# Patient Record
Sex: FEMALE | Race: WHITE | NOT HISPANIC OR LATINO
[De-identification: names, ages, dates, MRNs, and addresses within clinical notes are randomized per-mention and may not be internally consistent; named-entity substitution may affect disease eponyms.]

---

## 2017-04-26 PROBLEM — Z00.00 ENCOUNTER FOR PREVENTIVE HEALTH EXAMINATION: Status: ACTIVE | Noted: 2017-04-26

## 2017-06-03 ENCOUNTER — APPOINTMENT (OUTPATIENT)
Dept: OBGYN | Facility: CLINIC | Age: 57
End: 2017-06-03

## 2017-06-03 VITALS
SYSTOLIC BLOOD PRESSURE: 160 MMHG | BODY MASS INDEX: 27.97 KG/M2 | WEIGHT: 152 LBS | DIASTOLIC BLOOD PRESSURE: 80 MMHG | HEIGHT: 62 IN

## 2017-06-03 DIAGNOSIS — Z80.49 FAMILY HISTORY OF MALIGNANT NEOPLASM OF OTHER GENITAL ORGANS: ICD-10-CM

## 2017-06-03 DIAGNOSIS — Z80.0 FAMILY HISTORY OF MALIGNANT NEOPLASM OF DIGESTIVE ORGANS: ICD-10-CM

## 2017-06-03 DIAGNOSIS — Z78.9 OTHER SPECIFIED HEALTH STATUS: ICD-10-CM

## 2017-06-03 DIAGNOSIS — Z80.3 FAMILY HISTORY OF MALIGNANT NEOPLASM OF BREAST: ICD-10-CM

## 2017-06-03 DIAGNOSIS — K21.9 GASTRO-ESOPHAGEAL REFLUX DISEASE W/OUT ESOPHAGITIS: ICD-10-CM

## 2017-06-03 DIAGNOSIS — K44.9 DIAPHRAGMATIC HERNIA W/OUT OBSTRUCTION OR GANGRENE: ICD-10-CM

## 2017-06-03 DIAGNOSIS — O09.299 SUPERVISION OF PREGNANCY WITH OTHER POOR REPRODUCTIVE OR OBSTETRIC HISTORY, UNSPECIFIED TRIMESTER: ICD-10-CM

## 2017-06-06 LAB — HPV HIGH+LOW RISK DNA PNL CVX: NEGATIVE

## 2017-06-07 ENCOUNTER — TRANSCRIPTION ENCOUNTER (OUTPATIENT)
Age: 57
End: 2017-06-07

## 2017-06-08 LAB — CYTOLOGY CVX/VAG DOC THIN PREP: NORMAL

## 2018-07-28 PROBLEM — Z80.0 FAMILY HISTORY OF COLON CANCER: Status: ACTIVE | Noted: 2017-06-03

## 2019-03-06 ENCOUNTER — TRANSCRIPTION ENCOUNTER (OUTPATIENT)
Age: 59
End: 2019-03-06

## 2019-03-09 ENCOUNTER — APPOINTMENT (OUTPATIENT)
Dept: OBGYN | Facility: CLINIC | Age: 59
End: 2019-03-09
Payer: COMMERCIAL

## 2019-03-09 VITALS
DIASTOLIC BLOOD PRESSURE: 90 MMHG | BODY MASS INDEX: 28.52 KG/M2 | HEIGHT: 62 IN | SYSTOLIC BLOOD PRESSURE: 160 MMHG | WEIGHT: 155 LBS

## 2019-03-09 DIAGNOSIS — Z87.440 PERSONAL HISTORY OF URINARY (TRACT) INFECTIONS: ICD-10-CM

## 2019-03-09 DIAGNOSIS — N95.2 POSTMENOPAUSAL ATROPHIC VAGINITIS: ICD-10-CM

## 2019-03-09 DIAGNOSIS — Z01.419 ENCOUNTER FOR GYNECOLOGICAL EXAMINATION (GENERAL) (ROUTINE) W/OUT ABNORMAL FINDINGS: ICD-10-CM

## 2019-03-09 LAB
BILIRUB UR QL STRIP: NORMAL
GLUCOSE UR-MCNC: NORMAL
HCG UR QL: 0.2 EU/DL
HGB UR QL STRIP.AUTO: NORMAL
KETONES UR-MCNC: NORMAL
LEUKOCYTE ESTERASE UR QL STRIP: NORMAL
NITRITE UR QL STRIP: NORMAL
PH UR STRIP: 5.5
PROT UR STRIP-MCNC: NORMAL
SP GR UR STRIP: 1.01

## 2019-03-09 PROCEDURE — 99214 OFFICE O/P EST MOD 30 MIN: CPT | Mod: 25

## 2019-03-09 PROCEDURE — 99396 PREV VISIT EST AGE 40-64: CPT

## 2019-03-09 PROCEDURE — 81002 URINALYSIS NONAUTO W/O SCOPE: CPT

## 2019-03-09 NOTE — PHYSICAL EXAM
[Awake] : awake [Alert] : alert [Soft] : soft [Oriented x3] : oriented to person, place, and time [Normal] : uterus [Atrophy] : atrophy [No Bleeding] : there was no active vaginal bleeding [Pap Obtained] : a Pap smear was performed [Normal Position] : in a normal position [Uterine Adnexae] : were not tender and not enlarged [No Tenderness] : no rectal tenderness [RRR, No Murmurs] : RRR, no murmurs [CTAB] : CTAB [Acute Distress] : no acute distress [LAD] : no lymphadenopathy [Thyroid Nodule] : no thyroid nodule [Goiter] : no goiter [Mass] : no breast mass [Nipple Discharge] : no nipple discharge [Axillary LAD] : no axillary lymphadenopathy [Tender] : non tender [Distended] : not distended [H/Smegaly] : no hepatosplenomegaly [Depressed Mood] : not depressed [Flat Affect] : affect not flat [Tenderness] : nontender [Enlarged ___ wks] : not enlarged [Mass ___ cm] : no uterine mass was palpated [Adnexa Tenderness] : were not tender [Ovarian Mass (___ Cm)] : there were no adnexal masses [Occult Blood] : occult blood test from digital rectal exam was negative

## 2019-03-09 NOTE — CHIEF COMPLAINT
[Annual Visit] : annual visit [FreeTextEntry1] : C/o urinary frequency and burning with urination for past few days, large bld on urine dip?\par no vb\par takes vit d\par C/o pain with intercourse, using lubricants but not helping.

## 2019-03-09 NOTE — HISTORY OF PRESENT ILLNESS
[___ Year(s) Ago] : [unfilled] year(s) ago [Good] : being in good health [Healthy Diet] : a healthy diet [Regular Exercise] : regular exercise [Last Mammogram ___] : Last Mammogram was [unfilled] [Last Bone Density ___] : Last bone density studies [unfilled] [Last Colonoscopy ___] : Last colonoscopy [unfilled] [Last Pap ___] : Last cervical pap smear was [unfilled] [Postmenopausal] : is postmenopausal [Sexually Active] : is sexually active

## 2019-03-11 LAB
BACTERIA UR CULT: NORMAL
HPV HIGH+LOW RISK DNA PNL CVX: NOT DETECTED

## 2019-03-14 LAB — CYTOLOGY CVX/VAG DOC THIN PREP: NORMAL

## 2019-06-03 ENCOUNTER — RESULT CHARGE (OUTPATIENT)
Age: 59
End: 2019-06-03

## 2019-06-03 DIAGNOSIS — R92.2 INCONCLUSIVE MAMMOGRAM: ICD-10-CM

## 2020-09-23 ENCOUNTER — RESULT REVIEW (OUTPATIENT)
Age: 60
End: 2020-09-23

## 2020-09-23 ENCOUNTER — OUTPATIENT (OUTPATIENT)
Dept: OUTPATIENT SERVICES | Facility: HOSPITAL | Age: 60
LOS: 1 days | End: 2020-09-23
Payer: COMMERCIAL

## 2020-09-23 DIAGNOSIS — D04.5 CARCINOMA IN SITU OF SKIN OF TRUNK: ICD-10-CM

## 2020-09-23 PROCEDURE — 88321 CONSLTJ&REPRT SLD PREP ELSWR: CPT

## 2020-10-01 ENCOUNTER — APPOINTMENT (OUTPATIENT)
Dept: GYNECOLOGIC ONCOLOGY | Facility: CLINIC | Age: 60
End: 2020-10-01
Payer: COMMERCIAL

## 2020-10-01 VITALS
HEART RATE: 86 BPM | WEIGHT: 150 LBS | SYSTOLIC BLOOD PRESSURE: 188 MMHG | BODY MASS INDEX: 27.6 KG/M2 | DIASTOLIC BLOOD PRESSURE: 80 MMHG | HEIGHT: 62 IN

## 2020-10-01 PROCEDURE — 99243 OFF/OP CNSLTJ NEW/EST LOW 30: CPT | Mod: 25

## 2020-10-01 PROCEDURE — 56821 COLPOSCOPY VULVA W/BIOPSY: CPT

## 2020-10-08 LAB — CORE LAB BIOPSY: NORMAL

## 2020-12-21 PROBLEM — Z01.419 ENCOUNTER FOR GYNECOLOGICAL EXAMINATION: Status: RESOLVED | Noted: 2017-06-03 | Resolved: 2020-12-21

## 2020-12-21 PROBLEM — Z87.440 HISTORY OF URINARY TRACT INFECTION: Status: RESOLVED | Noted: 2019-03-09 | Resolved: 2020-12-21

## 2022-05-02 ENCOUNTER — NON-APPOINTMENT (OUTPATIENT)
Age: 62
End: 2022-05-02

## 2022-06-23 ENCOUNTER — APPOINTMENT (OUTPATIENT)
Dept: GYNECOLOGIC ONCOLOGY | Facility: CLINIC | Age: 62
End: 2022-06-23
Payer: COMMERCIAL

## 2022-06-23 ENCOUNTER — LABORATORY RESULT (OUTPATIENT)
Age: 62
End: 2022-06-23

## 2022-06-23 VITALS
WEIGHT: 142 LBS | BODY MASS INDEX: 26.13 KG/M2 | SYSTOLIC BLOOD PRESSURE: 176 MMHG | DIASTOLIC BLOOD PRESSURE: 73 MMHG | HEART RATE: 73 BPM | HEIGHT: 62 IN

## 2022-06-23 PROCEDURE — 56605 BIOPSY OF VULVA/PERINEUM: CPT

## 2022-06-23 PROCEDURE — 99213 OFFICE O/P EST LOW 20 MIN: CPT | Mod: 25

## 2022-06-30 ENCOUNTER — APPOINTMENT (OUTPATIENT)
Dept: NUCLEAR MEDICINE | Facility: CLINIC | Age: 62
End: 2022-06-30

## 2022-06-30 ENCOUNTER — APPOINTMENT (OUTPATIENT)
Dept: MRI IMAGING | Facility: CLINIC | Age: 62
End: 2022-06-30

## 2022-06-30 ENCOUNTER — OUTPATIENT (OUTPATIENT)
Dept: OUTPATIENT SERVICES | Facility: HOSPITAL | Age: 62
LOS: 1 days | End: 2022-06-30
Payer: COMMERCIAL

## 2022-06-30 ENCOUNTER — NON-APPOINTMENT (OUTPATIENT)
Age: 62
End: 2022-06-30

## 2022-06-30 DIAGNOSIS — C51.9 MALIGNANT NEOPLASM OF VULVA, UNSPECIFIED: ICD-10-CM

## 2022-06-30 PROCEDURE — 78815 PET IMAGE W/CT SKULL-THIGH: CPT | Mod: 26,PI

## 2022-06-30 PROCEDURE — 72197 MRI PELVIS W/O & W/DYE: CPT

## 2022-06-30 PROCEDURE — 72197 MRI PELVIS W/O & W/DYE: CPT | Mod: 26

## 2022-06-30 PROCEDURE — 78815 PET IMAGE W/CT SKULL-THIGH: CPT

## 2022-06-30 PROCEDURE — A9585: CPT

## 2022-06-30 PROCEDURE — A9552: CPT

## 2022-07-01 ENCOUNTER — OUTPATIENT (OUTPATIENT)
Dept: OUTPATIENT SERVICES | Facility: HOSPITAL | Age: 62
LOS: 1 days | Discharge: HOME | End: 2022-07-01

## 2022-07-01 DIAGNOSIS — C51.9 MALIGNANT NEOPLASM OF VULVA, UNSPECIFIED: ICD-10-CM

## 2022-07-05 ENCOUNTER — APPOINTMENT (OUTPATIENT)
Dept: MRI IMAGING | Facility: CLINIC | Age: 62
End: 2022-07-05

## 2022-07-05 ENCOUNTER — APPOINTMENT (OUTPATIENT)
Dept: NUCLEAR MEDICINE | Facility: CLINIC | Age: 62
End: 2022-07-05

## 2022-07-05 ENCOUNTER — RESULT REVIEW (OUTPATIENT)
Age: 62
End: 2022-07-05

## 2022-07-05 ENCOUNTER — APPOINTMENT (OUTPATIENT)
Dept: HEMATOLOGY ONCOLOGY | Facility: CLINIC | Age: 62
End: 2022-07-05

## 2022-07-05 VITALS
HEIGHT: 61.97 IN | DIASTOLIC BLOOD PRESSURE: 88 MMHG | WEIGHT: 139.97 LBS | SYSTOLIC BLOOD PRESSURE: 188 MMHG | TEMPERATURE: 97.3 F | BODY MASS INDEX: 25.76 KG/M2 | HEART RATE: 78 BPM | OXYGEN SATURATION: 99 % | RESPIRATION RATE: 16 BRPM

## 2022-07-05 DIAGNOSIS — Z87.19 PERSONAL HISTORY OF OTHER DISEASES OF THE DIGESTIVE SYSTEM: ICD-10-CM

## 2022-07-05 DIAGNOSIS — Z80.0 FAMILY HISTORY OF MALIGNANT NEOPLASM OF DIGESTIVE ORGANS: ICD-10-CM

## 2022-07-05 DIAGNOSIS — Z80.3 FAMILY HISTORY OF MALIGNANT NEOPLASM OF BREAST: ICD-10-CM

## 2022-07-05 LAB
BASOPHILS # BLD AUTO: 0.05 K/UL — SIGNIFICANT CHANGE UP (ref 0–0.2)
BASOPHILS NFR BLD AUTO: 0.9 % — SIGNIFICANT CHANGE UP (ref 0–2)
EOSINOPHIL # BLD AUTO: 0.08 K/UL — SIGNIFICANT CHANGE UP (ref 0–0.5)
EOSINOPHIL NFR BLD AUTO: 1.5 % — SIGNIFICANT CHANGE UP (ref 0–6)
HCT VFR BLD CALC: 37.7 % — SIGNIFICANT CHANGE UP (ref 34.5–45)
HGB BLD-MCNC: 12.5 G/DL — SIGNIFICANT CHANGE UP (ref 11.5–15.5)
IMM GRANULOCYTES NFR BLD AUTO: 1.1 % — SIGNIFICANT CHANGE UP (ref 0–1.5)
LYMPHOCYTES # BLD AUTO: 1.46 K/UL — SIGNIFICANT CHANGE UP (ref 1–3.3)
LYMPHOCYTES # BLD AUTO: 26.6 % — SIGNIFICANT CHANGE UP (ref 13–44)
MCHC RBC-ENTMCNC: 32.1 PG — SIGNIFICANT CHANGE UP (ref 27–34)
MCHC RBC-ENTMCNC: 33.2 G/DL — SIGNIFICANT CHANGE UP (ref 32–36)
MCV RBC AUTO: 96.7 FL — SIGNIFICANT CHANGE UP (ref 80–100)
MONOCYTES # BLD AUTO: 0.42 K/UL — SIGNIFICANT CHANGE UP (ref 0–0.9)
MONOCYTES NFR BLD AUTO: 7.7 % — SIGNIFICANT CHANGE UP (ref 2–14)
NEUTROPHILS # BLD AUTO: 3.42 K/UL — SIGNIFICANT CHANGE UP (ref 1.8–7.4)
NEUTROPHILS NFR BLD AUTO: 62.2 % — SIGNIFICANT CHANGE UP (ref 43–77)
NRBC # BLD: 0 /100 WBCS — SIGNIFICANT CHANGE UP (ref 0–0)
PLATELET # BLD AUTO: 193 K/UL — SIGNIFICANT CHANGE UP (ref 150–400)
RBC # BLD: 3.9 M/UL — SIGNIFICANT CHANGE UP (ref 3.8–5.2)
RBC # FLD: 12.1 % — SIGNIFICANT CHANGE UP (ref 10.3–14.5)
WBC # BLD: 5.49 K/UL — SIGNIFICANT CHANGE UP (ref 3.8–10.5)
WBC # FLD AUTO: 5.49 K/UL — SIGNIFICANT CHANGE UP (ref 3.8–10.5)

## 2022-07-05 PROCEDURE — 99205 OFFICE O/P NEW HI 60 MIN: CPT

## 2022-07-05 PROCEDURE — 93010 ELECTROCARDIOGRAM REPORT: CPT

## 2022-07-05 RX ORDER — NITROFURANTOIN (MONOHYDRATE/MACROCRYSTALS) 25; 75 MG/1; MG/1
100 CAPSULE ORAL
Qty: 14 | Refills: 0 | Status: DISCONTINUED | COMMUNITY
Start: 2019-03-09 | End: 2022-07-05

## 2022-07-06 LAB
ALBUMIN SERPL ELPH-MCNC: 4.2 G/DL
ALP BLD-CCNC: 66 U/L
ALT SERPL-CCNC: 13 U/L
ANION GAP SERPL CALC-SCNC: 12 MMOL/L
APTT BLD: 31.7 SEC
AST SERPL-CCNC: 20 U/L
BILIRUB SERPL-MCNC: 0.2 MG/DL
BUN SERPL-MCNC: 15 MG/DL
CALCIUM SERPL-MCNC: 9.2 MG/DL
CHLORIDE SERPL-SCNC: 107 MMOL/L
CO2 SERPL-SCNC: 26 MMOL/L
CORE LAB BIOPSY: NORMAL
CREAT SERPL-MCNC: 0.8 MG/DL
EGFR: 84 ML/MIN/1.73M2
GLUCOSE SERPL-MCNC: 103 MG/DL
HAV IGM SER QL: NONREACTIVE
HBV CORE IGM SER QL: NONREACTIVE
HBV SURFACE AG SER QL: NONREACTIVE
HCV AB SER QL: NONREACTIVE
HCV S/CO RATIO: 0.08 S/CO
INR PPP: 1 RATIO
MAGNESIUM SERPL-MCNC: 2.1 MG/DL
POTASSIUM SERPL-SCNC: 4.2 MMOL/L
PROT SERPL-MCNC: 6.3 G/DL
PT BLD: 11.8 SEC
SARS-COV-2 N GENE NPH QL NAA+PROBE: NOT DETECTED
SODIUM SERPL-SCNC: 145 MMOL/L

## 2022-07-06 NOTE — PHYSICAL EXAM
[Fully active, able to carry on all pre-disease performance without restriction] : Status 0 - Fully active, able to carry on all pre-disease performance without restriction [Normal] : affect appropriate [de-identified] : Vulvar mass, appears 5 x 3 cm

## 2022-07-06 NOTE — HISTORY OF PRESENT ILLNESS
[Disease: _____________________] : Disease: [unfilled] [AJCC Stage: ____] : AJCC Stage: [unfilled] [de-identified] : Squamous Cell carcinoma, vulvar [de-identified] : 61 year old woman with hx of HTN, celiac disease presenting for initial consultation of vulvar mass.\par \par Pt states she was diagnosed with TATYANA 3 10/2020 by Dr. Jane and was started on Aldara. She returned to her clinic 3 months ago as patient discovered a vaginal cyst. She had a biopsy performed 6/23/22.\par \par MR Pelvis 6/30/22: \par IMPRESSION:\par Large vulvar mass 5.5 x 4.8 x 2.5 cm  extends to the anterior lower vagina and distal urethra.\par Right inguinal lymphadenopathy  1.8 x 1.3 cm highly suspicious for hermann involvement by carcinoma\par \par PET/CT 6/2022\par IMPRESSION: Abnormal FDG-PET/CT scan.\par 1. FDG avidity in bilateral vulva compatible with newly diagnosed malignancy, with extension of FDG avidity into the vaginal region concerning for disease involvement.\par 2. FDG avid right inguinal lymph nodes compatible with metastases. Mildly FDG avid single left inguinal lymph node concerning for additional metastatic disease; this may be further evaluated with ultrasound and possible FNA biopsy as indicated.\par Feeling well. Endorses slight fatigue, felt it may be related to work. Appetite well but endorses weight loss,  States had painful urination a over a year but has improved. Denies fevers, SOB, CP, AP, hematuria, \par slight bleeding with wiping and associated pain. \par \par LMP 2008  [de-identified] : Patient has some perineal discomfort. No urinary or bowel issues.

## 2022-07-06 NOTE — REASON FOR VISIT
[Initial Consultation] : an initial consultation [Family Member] : family member [FreeTextEntry2] : vulvar mass

## 2022-07-06 NOTE — REVIEW OF SYSTEMS
[Fatigue] : fatigue [Fever] : no fever [Dysphagia] : no dysphagia [Mucosal Pain] : no mucosal pain [Chest Pain] : no chest pain [Lower Ext Edema] : no lower extremity edema [Shortness Of Breath] : no shortness of breath [Cough] : no cough [Abdominal Pain] : no abdominal pain [Constipation] : no constipation [Diarrhea] : no diarrhea [Dysuria] : no dysuria [Vaginal Discharge] : no vaginal discharge [Joint Pain] : no joint pain [Muscle Pain] : no muscle pain [Skin Rash] : no skin rash [Dizziness] : no dizziness [Difficulty Walking] : no difficulty walking [Anxiety] : no anxiety [Depression] : no depression [Hot Flashes] : no hot flashes [Easy Bleeding] : no tendency for easy bleeding

## 2022-07-06 NOTE — CONSULT LETTER
[Dear  ___] : Dear  [unfilled], [Consult Letter:] : I had the pleasure of evaluating your patient, [unfilled]. [Consult Closing:] : Thank you very much for allowing me to participate in the care of this patient.  If you have any questions, please do not hesitate to contact me. [Sincerely,] : Sincerely, [DrCecy  ___] : Dr. VELAZQUEZ

## 2022-07-06 NOTE — ASSESSMENT
[FreeTextEntry1] : \par  [Palliative] : Goals of care discussed with patient: Palliative [Palliative Care Plan] : not applicable at this time

## 2022-07-06 NOTE — RESULTS/DATA
[FreeTextEntry1] : PAP SMEAR 6/23/2022\par HPV: Negative\par \par \par \par \par Surgical Pathology Report - Auth (Verified)\par Accession:                             [id number]\par Collected Date/Time:                   6/23/2022 13:46 EDT\par \par Specimen(s) Submitted\par 1  Vulvar mass\par \par Final Diagnosis\par 1. Vulva, mass, biopsy:\par      -Squamous cell carcinoma, at least in situ, invasion is not excluded.\par  Inflammation and necrosis are identified.\par \par PET/CT scan 6/30/2022\par IMPRESSION: Abnormal FDG-PET/CT scan.\par \par 1. FDG avidity in bilateral vulva compatible with newly diagnosed malignancy, with extension of FDG avidity into the vaginal region concerning for disease involvement.\par 2. FDG avid right inguinal lymph nodes compatible with metastases. Mildly FDG avid single left inguinal lymph node concerning for additional metastatic disease; this may be further evaluated with ultrasound and possible FNA biopsy as indicated.\par 3. Nonspecific mildly asymmetric FDG activity in the right base of tongue. Correlate clinically or with direct visualization.\par \par MRI Pelvis w/wo IV contrast \par IMPRESSION:\par Large vulvar mass extends to the anterior lower vagina and distal urethra.\par Right inguinal lymphadenopathy highly suspicious for hermann involvement by carcinoma\par Distended bladder.\par

## 2022-07-08 ENCOUNTER — OUTPATIENT (OUTPATIENT)
Dept: OUTPATIENT SERVICES | Facility: HOSPITAL | Age: 62
LOS: 1 days | Discharge: HOME | End: 2022-07-08

## 2022-07-14 ENCOUNTER — APPOINTMENT (OUTPATIENT)
Dept: RADIATION ONCOLOGY | Facility: CLINIC | Age: 62
End: 2022-07-14

## 2022-07-14 ENCOUNTER — APPOINTMENT (OUTPATIENT)
Dept: OTOLARYNGOLOGY | Facility: CLINIC | Age: 62
End: 2022-07-14

## 2022-07-14 VITALS
SYSTOLIC BLOOD PRESSURE: 163 MMHG | HEIGHT: 62 IN | WEIGHT: 139 LBS | HEART RATE: 65 BPM | DIASTOLIC BLOOD PRESSURE: 75 MMHG | BODY MASS INDEX: 25.58 KG/M2 | TEMPERATURE: 98.5 F

## 2022-07-14 PROCEDURE — 31575 DIAGNOSTIC LARYNGOSCOPY: CPT

## 2022-07-14 PROCEDURE — 99204 OFFICE O/P NEW MOD 45 MIN: CPT | Mod: 25

## 2022-07-14 RX ORDER — IMIQUIMOD 50 MG/G
5 CREAM TOPICAL
Qty: 1 | Refills: 3 | Status: COMPLETED | COMMUNITY
Start: 2020-10-08 | End: 2022-07-14

## 2022-07-14 NOTE — HISTORY OF PRESENT ILLNESS
[de-identified] : 61 yro female with vulvar cancer (going to be treated with CRT) referred for eval of BOT activity as seen on PET scan done 6/30/2022. Today pt states she has deviated septum and  PND for many years. Denies throat pain,  dysphagia, dyspnea, hemoptysis, dysphonia. No fever, chills, weight loss. Tolerating regular diet. Pt with hiatal hernia and states she eats smaller bites and chews more to prevent the discomfort associated with that. \par \par \par PET 6/30/2022:\par IMPRESSION: Abnormal FDG-PET/CT scan.\par \par 1. FDG avidity in bilateral vulva compatible with newly diagnosed malignancy, with extension of FDG avidity into the vaginal region concerning for disease involvement.\par \par 2. FDG avid right inguinal lymph nodes compatible with metastases. Mildly FDG avid single left inguinal lymph node concerning for additional metastatic disease; this may be further evaluated with ultrasound and possible FNA biopsy as indicated.\par \par 3. Nonspecific mildly asymmetric FDG activity in the right base of tongue. Correlate clinically or with direct visualization.\par

## 2022-07-14 NOTE — CONSULT LETTER
[Dear  ___] : Dear  [unfilled], [Consult Letter:] : I had the pleasure of evaluating your patient, [unfilled]. [Please see my note below.] : Please see my note below. [Consult Closing:] : Thank you very much for allowing me to participate in the care of this patient.  If you have any questions, please do not hesitate to contact me. [Sincerely,] : Sincerely, [FreeTextEntry2] : Dr Tiera Mcmillan [FreeTextEntry3] : \par Nico De Santiago MD, FACS\par \par Otolaryngology-Head and Neck Surgery\par [employer] [facility]\par

## 2022-07-19 NOTE — PHYSICAL EXAM
[] : no respiratory distress [Normal] : oriented to person, place and time, the affect was normal, the mood was normal and not anxious [General Appearance - In No Acute Distress] : in no acute distress [Abdomen Soft] : soft [de-identified] : anterior vulvar mass with suburetral/vaginal extension [de-identified] : palpble rt inguinal node

## 2022-07-19 NOTE — DISEASE MANAGEMENT
[Clinical] : TNM Stage: c [FreeTextEntry4] : vulvar cancer [TTNM] : 2 [NTNM] : 1 [MTNM] : 0 [III] : III

## 2022-07-19 NOTE — HISTORY OF PRESENT ILLNESS
[FreeTextEntry1] : Ms. 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 Jane, who was lost to follow up until June 2022 when imaging and biopsy confirmed vulvar SCC.\par \par  \par History of Present Illness: \par She initially presented to Dr. Jane in Oct. 2020 reporting worsening vulvar burning and irritation since Feb. 2020. Endometrial biopsy was reportedly postponed secondary to pandemic. \par \par 9/11/20 - Vulva biopsy: High-grade squamous intraepithelial lesion (TATYANA 3) with warty features.\par \par 10/1/20 - Clitoris lesion biopsy: High grade squamous intraepithelial lesion (KIMI 2) with koilocytotic atypia (HPV associated changes).\par  - Pap smear: HPV mRNA HR not detected, negative for intraepithelial lesion or malignancy.\par \par 10/1/20 - Began treatment with Aldara cream per Dr. Jane.\par \par 6/23/22 - She returned to Dr. Jane, states she had been using the Aldara for a few months. She felt that it improved for a while but recently in the past few months she noticed a "lump" on the right side; she complains of constant vulvar burning/irritation, and small amount of blood seen on toilet paper after wiping the area. \par  - Vulva mass biopsy: Squamous cell carcinoma, at least in situ, invasion is not excluded. Inflammation and necrosis are identified.\par \par 6/30/22 - PET CT: FDG avidity in bilateral vulva compatible with newly diagnosed malignancy, with extension of FDG avidity into the vaginal region concerning for disease involvement. FDG avid right inguinal lymph nodes compatible with metastases. Mildly FDG avid single left inguinal lymph node concerning for additional metastatic disease; this may be further evaluated with ultrasound and possible FNA biopsy as indicated. Nonspecific mildly asymmetric FDG activity in the right base of tongue. \par \par  - MRI Pelvis: Large vulvar mass extends to the anterior lower vagina and distal urethra. Right inguinal lymphadenopathy highly suspicious for hermann involvement by carcinoma. Distended bladder.\par \par Dr. Jane therefore sent referral info to Medical Oncology and Radiation Oncology given; the extent of urethral involvement warrants chemoradiation rather than upfront surgical resection.\par \par 7/5/22 - Patient reviewed standard treatment with EBRT and weekly Cisplatin at consultation with Dr. Sanders. \par \par \par She presents today for discussion of radiation therapy.

## 2022-07-21 PROCEDURE — 77263 THER RADIOLOGY TX PLNG CPLX: CPT

## 2022-07-25 PROCEDURE — 77470 SPECIAL RADIATION TREATMENT: CPT | Mod: 26

## 2022-07-26 ENCOUNTER — NON-APPOINTMENT (OUTPATIENT)
Age: 62
End: 2022-07-26

## 2022-07-26 PROCEDURE — 77334 RADIATION TREATMENT AID(S): CPT | Mod: 26

## 2022-07-29 ENCOUNTER — EMERGENCY (EMERGENCY)
Facility: HOSPITAL | Age: 62
LOS: 1 days | Discharge: HOME | End: 2022-07-29
Attending: EMERGENCY MEDICINE | Admitting: EMERGENCY MEDICINE

## 2022-07-29 VITALS
RESPIRATION RATE: 16 BRPM | TEMPERATURE: 99 F | DIASTOLIC BLOOD PRESSURE: 82 MMHG | HEART RATE: 67 BPM | SYSTOLIC BLOOD PRESSURE: 160 MMHG

## 2022-07-29 VITALS
TEMPERATURE: 99 F | HEIGHT: 61.97 IN | SYSTOLIC BLOOD PRESSURE: 161 MMHG | RESPIRATION RATE: 16 BRPM | OXYGEN SATURATION: 100 % | DIASTOLIC BLOOD PRESSURE: 71 MMHG | HEART RATE: 81 BPM

## 2022-07-29 LAB
ALBUMIN SERPL ELPH-MCNC: 4.1 G/DL — SIGNIFICANT CHANGE UP (ref 3.3–5)
ALP SERPL-CCNC: 60 U/L — SIGNIFICANT CHANGE UP (ref 40–120)
ALT FLD-CCNC: 10 U/L — SIGNIFICANT CHANGE UP (ref 4–33)
ANION GAP SERPL CALC-SCNC: 10 MMOL/L — SIGNIFICANT CHANGE UP (ref 7–14)
AST SERPL-CCNC: 17 U/L — SIGNIFICANT CHANGE UP (ref 4–32)
BASOPHILS # BLD AUTO: 0.04 K/UL — SIGNIFICANT CHANGE UP (ref 0–0.2)
BASOPHILS NFR BLD AUTO: 0.6 % — SIGNIFICANT CHANGE UP (ref 0–2)
BILIRUB SERPL-MCNC: 0.3 MG/DL — SIGNIFICANT CHANGE UP (ref 0.2–1.2)
BUN SERPL-MCNC: 18 MG/DL — SIGNIFICANT CHANGE UP (ref 7–23)
CALCIUM SERPL-MCNC: 9.5 MG/DL — SIGNIFICANT CHANGE UP (ref 8.4–10.5)
CHLORIDE SERPL-SCNC: 104 MMOL/L — SIGNIFICANT CHANGE UP (ref 98–107)
CO2 SERPL-SCNC: 27 MMOL/L — SIGNIFICANT CHANGE UP (ref 22–31)
CREAT SERPL-MCNC: 0.86 MG/DL — SIGNIFICANT CHANGE UP (ref 0.5–1.3)
EGFR: 77 ML/MIN/1.73M2 — SIGNIFICANT CHANGE UP
EOSINOPHIL # BLD AUTO: 0.04 K/UL — SIGNIFICANT CHANGE UP (ref 0–0.5)
EOSINOPHIL NFR BLD AUTO: 0.6 % — SIGNIFICANT CHANGE UP (ref 0–6)
GLUCOSE SERPL-MCNC: 124 MG/DL — HIGH (ref 70–99)
HCT VFR BLD CALC: 36.6 % — SIGNIFICANT CHANGE UP (ref 34.5–45)
HGB BLD-MCNC: 11.9 G/DL — SIGNIFICANT CHANGE UP (ref 11.5–15.5)
IANC: 4.5 K/UL — SIGNIFICANT CHANGE UP (ref 1.8–7.4)
IMM GRANULOCYTES NFR BLD AUTO: 0.3 % — SIGNIFICANT CHANGE UP (ref 0–1.5)
LYMPHOCYTES # BLD AUTO: 1.35 K/UL — SIGNIFICANT CHANGE UP (ref 1–3.3)
LYMPHOCYTES # BLD AUTO: 21.1 % — SIGNIFICANT CHANGE UP (ref 13–44)
MCHC RBC-ENTMCNC: 31.9 PG — SIGNIFICANT CHANGE UP (ref 27–34)
MCHC RBC-ENTMCNC: 32.5 GM/DL — SIGNIFICANT CHANGE UP (ref 32–36)
MCV RBC AUTO: 98.1 FL — SIGNIFICANT CHANGE UP (ref 80–100)
MONOCYTES # BLD AUTO: 0.46 K/UL — SIGNIFICANT CHANGE UP (ref 0–0.9)
MONOCYTES NFR BLD AUTO: 7.2 % — SIGNIFICANT CHANGE UP (ref 2–14)
NEUTROPHILS # BLD AUTO: 4.5 K/UL — SIGNIFICANT CHANGE UP (ref 1.8–7.4)
NEUTROPHILS NFR BLD AUTO: 70.2 % — SIGNIFICANT CHANGE UP (ref 43–77)
NRBC # BLD: 0 /100 WBCS — SIGNIFICANT CHANGE UP
NRBC # FLD: 0 K/UL — SIGNIFICANT CHANGE UP
PLATELET # BLD AUTO: 205 K/UL — SIGNIFICANT CHANGE UP (ref 150–400)
POTASSIUM SERPL-MCNC: 3.8 MMOL/L — SIGNIFICANT CHANGE UP (ref 3.5–5.3)
POTASSIUM SERPL-SCNC: 3.8 MMOL/L — SIGNIFICANT CHANGE UP (ref 3.5–5.3)
PROT SERPL-MCNC: 6.9 G/DL — SIGNIFICANT CHANGE UP (ref 6–8.3)
RBC # BLD: 3.73 M/UL — LOW (ref 3.8–5.2)
RBC # FLD: 11.9 % — SIGNIFICANT CHANGE UP (ref 10.3–14.5)
SODIUM SERPL-SCNC: 141 MMOL/L — SIGNIFICANT CHANGE UP (ref 135–145)
WBC # BLD: 6.41 K/UL — SIGNIFICANT CHANGE UP (ref 3.8–10.5)
WBC # FLD AUTO: 6.41 K/UL — SIGNIFICANT CHANGE UP (ref 3.8–10.5)

## 2022-07-29 PROCEDURE — 99282 EMERGENCY DEPT VISIT SF MDM: CPT

## 2022-07-29 PROCEDURE — ZZZZZ: CPT

## 2022-07-29 PROCEDURE — 99284 EMERGENCY DEPT VISIT MOD MDM: CPT

## 2022-07-29 NOTE — ED PROVIDER NOTE - CLINICAL SUMMARY MEDICAL DECISION MAKING FREE TEXT BOX
Vulvar cancer likely source of bleeding, not suspecting uterine bleeding, already treated w. silver nitrate in ED, will get cbc to look for anemia although suspicion low, likely dc home

## 2022-07-29 NOTE — ED PROVIDER NOTE - PHYSICAL EXAMINATION
CONSTITUTIONAL: well-appearing, in NAD  SKIN: Warm dry, normal skin turgor  HEAD: NCAT  NECK: Supple; non tender. Full ROM.  CARD: RRR, no murmurs.  RESP: clear to ausculation b/l. No crackles or wheezing.  ABD: soft, non-tender, non-distended, no rebound or guarding.  GYN: Exam performed alongside GYN ONC fellows- 4cm x 2cm mass over R vulva, not bleeding but friable tissue present, no bleeding from vulvar introitus  MSK: no pedal edema, no calf tenderness  PSYCH: Cooperative, appropriate.

## 2022-07-29 NOTE — ED PROVIDER NOTE - NS ED ROS FT
Constitutional:  (-) fever, (-) chills, (-) lethargy  Eyes:  (-) eye pain (-) visual changes  ENMT: (-) nasal discharge, (-) sore throat. (-) neck pain or stiffness  Cardiac: (-) chest pain (-) palpitations  Respiratory:  (-) cough (-) respiratory distress.   GI:  (-) nausea (-) vomiting (-) diarrhea (-) abdominal pain.  :  (-) dysuria (-) frequency (-) burning.  MS:  (-) back pain (-) joint pain.  Neuro:  (-) headache (-) numbness (-) tingling (-) focal weakness  gyn:+ vaginal bleeding  Skin:  (-) rash  Except as documented in the HPI,  all other systems are negative

## 2022-07-29 NOTE — ED ADULT TRIAGE NOTE - CHIEF COMPLAINT QUOTE
Pt c/o vaginal bleeding beginning today. Reports recently diagnosed with vulvar cancer, has not yet started tx. Oncologist Jr advised pt to come to ER. Reports has went through 4 pads in past hour. Pt denies weakness, dizziness. PMHx: HTN

## 2022-07-29 NOTE — ED PROVIDER NOTE - PROGRESS NOTE DETAILS
Jamar Cotto, DO PGY-2: Labs nonactionable, GYN controlled the bleeding and pt feels well and will f/u GYN outpt.

## 2022-07-29 NOTE — ED PROVIDER NOTE - ATTENDING CONTRIBUTION TO CARE
DR. KAY, ATTENDING MD-  I performed a face to face bedside interview with the patient regarding history of present illness, review of symptoms and past medical history. I completed an independent physical exam.  I have discussed the patient's plan of care with the resident.   Documentation as above in the note.    60 y/o female h/o vulvar ca with bleeding from known mass.  Seen by gyn/onc, had bleed controlled with silver nitrate application at bedside.  Gyn/onc request h/h.  Will obtain cbc bmp monitor for re-bleed, likely dc with gyn/onc f/u as o/p.

## 2022-07-29 NOTE — ED PROVIDER NOTE - OBJECTIVE STATEMENT
61f pmh htn, vulvar ca presnets to ED for bleeding from cancer site today, has happened before but never this severe, unsure if still bleeding. Pt denies lightheadedness, weakness, sob, states she got anxious when bleeding started but has since improved, no trauma. no urinary complaints, unsure if any active vaginal bleeding.

## 2022-07-29 NOTE — ED PROVIDER NOTE - NSFOLLOWUPINSTRUCTIONS_ED_ALL_ED_FT
1. Please follow-up with your GYN doctor as scheduled.    2. Please take tylenol/advil for pain as needed. please follow instructions on the packaging.    2. Please return to the ER if you develop worsening pain, bleeding, lightheadedness, dizziness, chest pain, shortness of breath or anything of concern.

## 2022-07-29 NOTE — ED PROVIDER NOTE - PATIENT PORTAL LINK FT
You can access the FollowMyHealth Patient Portal offered by [facility] by registering at the following website: [email or web address]. By joining [employer]’s FollowMyHealth portal, you will also be able to view your health information using other applications (apps) compatible with our system.

## 2022-07-29 NOTE — CONSULT NOTE ADULT - PROBLEM SELECTOR RECOMMENDATION 9
GYN ONC Fellow Addendum:    Pt seen and examined at bedside. Agree with above. Known invasive vulvar SCC w/ 6cm exophytic clitoral mass. S/p CT SIM 7/26, cis/IMRT planning underway. Presenting after large volume vaginal bleeding earlier today, now improved. On exam no bleeding noted from mass, speculum exam limited but w/o bleeding. Silver nitrate applied to small areas of vascularity.    VSS and CBC stable from prior, no active bleeding on exam. Stable for d/c home  bleeding precautions d/w pt in detail  Will reach out to rad onc team about new bleeding, treatment planning already underway     Connie Montgomery MD  D/w Dr. Jane

## 2022-07-29 NOTE — CONSULT NOTE ADULT - ASSESSMENT
62yo w/ SCC of the vulva presenting with worsening bleeding today, now improved. VSS. On exam, no active bleeding noted. Areas of desquamation cauterized with silver nitrate.     Recs:   - please obtain CBC  - if CBC is wnl, patient clear for discharge home from GynOnc perspective  - return precautions discussed  - Patient to continue follow up as planned with RadOnc/MedOnc to begin treatment     Melanie Barajas MD PGY3  Patient seen and examined with Dr. Montgomery

## 2022-07-29 NOTE — CONSULT NOTE ADULT - SUBJECTIVE AND OBJECTIVE BOX
RASHAUN MATT  61y  Female [id number]    HPI:  62yo F w/ SCC of the vulva presenting to ED with 1 day of worsening bleeding. Patient states that she has daily spotting, usually worse in the morning. Today while at work she noted heavier bleeding. She filled 3 pads in approx 3hrs this afternoon. States that bleeding has improved since coming to the ED. She endorses pain while sitting, unchanged from baseline today. Denies CP, SOB, lightheadedness, dizziness.     Oncologic History: Patient initially presented with vulvar irritation and burning in October 2020, was diagnosed with VIN3 and prescribed Aldara. Patient was lost to follow up. She re-presented 6/2022 with a vulvar mass. Imaging showed a vulvar mass extending to vagina and urethra. Mass was biopsied and found to be squamous cell carcinoma. PET scan notable for FDG avid R inguinal lymph nodes. Patient was seen by MedOnc and RadOnc with plans for EBRT and Cisplatin which she has not yet started.     GynOnc: [facility]       PMHX: HTN, celiac disease  PSHx: D&Cx3, endometrial ablation       Vital Signs Last 24 Hrs  T(C): 37.1 (29 Jul 2022 16:25), Max: 37.1 (29 Jul 2022 16:25)  T(F): 98.7 (29 Jul 2022 16:25), Max: 98.7 (29 Jul 2022 16:25)  HR: 67 (29 Jul 2022 18:48) (67 - 81)  BP: 160/82 (29 Jul 2022 18:48) (160/82 - 161/71)  BP(mean): --  RR: 16 (29 Jul 2022 18:48) (16 - 16)  SpO2: 100% (29 Jul 2022 16:25) (100% - 100%)    Parameters below as of 29 Jul 2022 18:48  Patient On (Oxygen Delivery Method): room air        Physical Exam:   General: sitting comfortably in bed, NAD   CV: RRR  Lungs: nl work of breathing   Back: No CVA tenderness  : Approx [id number] exophytic right vulvar mass with areas of desquamation, no active bleeding.   Speculum: No bleeding seen coming from cervix or vagina

## 2022-07-29 NOTE — ED ADULT NURSE NOTE - COVID-19 ORDERING FACILITY
LOV: 8/31/2020  FUV: 12/15/2020     Disp Refills Start End    amphetamine-dextroamphetamine (Adderall) 30 MG tablet 60 tablet 0 9/23/2020     Sig - Route: Take 1 tablet by mouth 2 times daily. DX F90.0 - Oral         [facility]

## 2022-07-29 NOTE — ED ADULT NURSE NOTE - HEART RATE (BEATS/MIN)
"ED Provider Note    CHIEF COMPLAINT  Chief Complaint   Patient presents with   • Leg Pain   • Seizure        [facility]    Primary care provider: Vika Norton M.D.   History obtained from: Patient  History limited by: None     Peter Trimble is a 44 y.o. female who presents to the ED complaining of multiple episodes of seizures today.  Patient states that she has been taking her valproic acid as prescribed but continues to have \"small ones\" lasting several seconds with each episode.  She reports that she is unable to get out of bed due to the fact that she keeps having these small seizures.  Patient states that she was recently evaluated and was told that her seizures were caused by \"psychological.\".  She also reports continued right foot swelling and pain after injury 2 months ago.  Patient states that she had x-rays and was told that she bruised it but it is not healing.  Patient also reports headache but otherwise denies pain anywhere else.  She reports feeling possible fever today but did not check her temperature.  She has had nausea without vomiting.  She reports that she feels short of breath at times.  She denies diarrhea or dysuria.    REVIEW OF SYSTEMS  Please see HPI for pertinent positives/negatives.  All other systems reviewed and are negative.     PAST MEDICAL HISTORY  Past Medical History:   Diagnosis Date   • History of pregnancy 10/16/2014        • Scalp wound 2014   • Seizure ([facility]) 14    last    • Pain 14    \"my body hurts all the time\", 5-6/10   • Cold    • Acute blood loss anemia 2013    -resolved, postop     • Hyperlipidemia 14    \"off medication\"   • Pneumonia    • Acute renal failure (ARF) ([facility]) 10/5/2011    -resolved (post op )    • Essential hypertension, malignant 2011   • Subarachnoid hemorrhage ([facility]) 2011    -small,  (2nd to HTN)    • Stroke ([facility])      reports strokes x5 , and a \"brain bleed\"   • Anemia    • Anxiety  "   • Arthritis     osteoarthritis since 15yo.   • ASTHMA     O2 3liters at HS and prn   • Bipolar affective (HCC)    • Breath shortness    • Depression    • Eclampsia complicating pregnancy    • Environmental allergies    • Fibromyalgia    • GERD (gastroesophageal reflux disease)    • Heart murmur     she denies this hx   • Hx MRSA infection    • Hypertension    • Kidney stones    • Migraines    • Personality disorder (HCC)    • PTSD (post-traumatic stress disorder)    • Sleep apnea     has had a sleep study, use O2 at HS   • Snoring    • Unspecified hemorrhagic conditions     nose-bleeds, bruises easily   • Unspecified urinary incontinence    • Urinary bladder disorder         SURGICAL HISTORY  Past Surgical History:   Procedure Laterality Date   • ANKLE ORIF Right 8/7/2016    Procedure: ANKLE ORIF;  Surgeon: Bill Brambila M.D.;  Location: SURGERY [facility];  Service:    • IRRIGATION & DEBRIDEMENT GENERAL  8/17/2014    Performed by Ezra Wright M.D. at SURGERY [facility]   • BREAST BIOPSY  5/29/2014    Performed by Negro Hester M.D. at SURGERY SAME DAY [facility]   • EVACUATION OF HEMATOMA  5/2/2013    Performed by Lazaro Connors Jr., M.D. at SURGERY [facility]   • MAMMOPLASTY REDUCTION  4/29/2013    Performed by Negro Hester M.D. at SURGERY [facility]   • RECOVERY  1/30/2012    Performed by BENEDICT IR-RECOVERY at SURGERY SAME DAY [facility]   • RECOVERY  10/5/2011    Performed by MARIAELENA MCMULLENONADELITA at SURGERY [facility]   • HYSTERECTOMY LAPAROSCOPY      W BSO   • KNEE RECONSTRUCTION     • LAMINOTOMY     • OTHER ORTHOPEDIC SURGERY      maddie. knee scopes   • OTHER ORTHOPEDIC SURGERY      back fusion then hardware removal   • PB EXPLORATION OF SPINAL FUSION     • PB REMOVAL OF OVARY(S)          SOCIAL HISTORY  Social History     Tobacco Use   • Smoking status: Never Smoker   • Smokeless tobacco: Never Used   Substance and Sexual Activity   • Alcohol use: No   • Drug use: No  "  • Sexual activity: Not on file        FAMILY HISTORY  Family History   Problem Relation Age of Onset   • Hypertension Mother    • Hyperlipidemia Mother    • Hypertension Father    • Hyperlipidemia Father    • Heart Disease Father    • Psychiatric Illness Father    • Alcohol/Drug Father    • Alcohol/Drug Brother    • Arthritis Maternal Uncle    • Psychiatric Illness Maternal Uncle    • Heart Disease Maternal Uncle    • Hypertension Maternal Uncle    • Hyperlipidemia Maternal Uncle    • Genetic Disorder Paternal Aunt    • Psychiatric Illness Paternal Uncle    • Arthritis Maternal Grandmother    • Heart Disease Maternal Grandmother    • Alcohol/Drug Maternal Grandfather    • Stroke Maternal Grandfather    • Psychiatric Illness Maternal Grandfather    • Arthritis Paternal Grandmother    • Alcohol/Drug Paternal Grandfather         CURRENT MEDICATIONS  Home Medications    **Home medications have not yet been reviewed for this encounter**          ALLERGIES  Allergies   Allergen Reactions   • Compazine      \"psychotic reaction\"   • Imitrex [Sumatriptan Succinate]      Had brain bleed   • Inapsine [Droperidol]      \"psychotic reaction\"   • Maxalt [Rizatriptan Benzoate]      Had brain bleed   • Phenergan [Promethazine Hcl]      \"psychotic reaction\"   • Xanax [Alprazolam]      Sores and dry mouth, many others pt cannot remember   • Zantac      Stomach pain   • Apple Cider Vinegar Rash     Patient states she gets rash   • Butrans [Buprenorphine]    • Clarithromycin Vomiting and Palpitations   • Dilaudid [Hydromorphone] Rash     Patient states she had rash, hallucinations, unable to urinate.    • Doxycycline    • Effexor [Venlafaxine]      \"Really depressed, like i wanted to hurt myself\"   • Eucalyptus Oil    • Gabapentin    • Kiwi Extract    • Latex Rash   • Lyrica [Pregabalin]      \"depression, slept a lot\"   • Minocycline Vomiting     \"any cyclines\"   • Morphabond Er [Suly]    • Patchouli Oil Rash     Rash    • Sulfa " 67 Drugs    • Tea Tree Oil Rash     Break out in rash   • Topiramate Nausea     Patient states made sick to stomach and dizzy.        PHYSICAL EXAM  VITAL SIGNS: /81   Pulse 69   Temp 36.7 °C (98 °F) (Temporal)   Resp 18   Wt 124.7 kg (275 lb)   SpO2 97%   BMI 38.35 kg/m²  @ABIMAEL[[id number]::@     Pulse ox interpretation: 92% I interpret this pulse ox as normal     Cardiac monitor interpretation: Sinus rhythm with heart rate in the 70s as interpreted by me.  The patient presented with seizures and cardiac monitor was ordered to monitor for dysrhythmia.    Constitutional: Well developed, well nourished, alert in no apparent distress, nontoxic appearance    HENT: No external signs of trauma, normocephalic, oropharynx moist and clear, no signs of tongue abrasion, nose normal    Eyes: PERRL, EOMI, conjunctiva without erythema, no discharge, no icterus   Neck: Soft and supple, trachea midline, no stridor, no tenderness, no LAD, no JVD, good ROM without apparent restrictions or discomfort  Cardiovascular: Regular rate and rhythm, no murmurs/rubs/gallops, strong distal pulses and good perfusion    Thorax & Lungs: No respiratory distress, CTAB   Abdomen: Soft, nontender, nondistended, no guarding, no rebound, normal BS    Back: No CVAT    Extremities: No cyanosis, no gross deformity, mild right pedal edema without rash/erythema/warmth/crepitus/fluctuance/tenseness, overlying dry peeling skin good ROM, no apparent tenderness, intact distal pulses with brisk cap refill    Skin: Warm, dry, no pallor/cyanosis, no rash noted except as above  Lymphatic: No lymphadenopathy noted    Neuro: Alert and oriented to person, place, and time.  GCS 15.  CN II-XII grossly intact.  Normal speech.  Equal strength bilateral UE/LE.  Sensation intact to touch.  No cerebellar signs.    Psychiatric: Cooperative, slightly anxious      DIAGNOSTIC STUDIES / PROCEDURES    EKG  12 Lead EKG obtained at 2230 and interpreted by me:   Rate: 72   Rhythm:  Sinus rhythm   Ectopy: None  Intervals: Normal   Axis: LAD  QRS: Normal   ST segments: Normal  T Waves: T wave inversion in septal leads    Clinical Impression: Sinus rhythm with nonspecific T wave changes  Compared to September 10, 2019 with similar appearance      LABS  All labs reviewed by me.     Results for orders placed or performed during the hospital encounter of 09/27/19   CBC WITH DIFFERENTIAL   Result Value Ref Range    WBC 8.1 4.8 - 10.8 K/uL    RBC 4.58 4.20 - 5.40 M/uL    Hemoglobin 12.7 12.0 - 16.0 g/dL    Hematocrit 41.3 37.0 - 47.0 %    MCV 90.2 81.4 - 97.8 fL    MCH 27.7 27.0 - 33.0 pg    MCHC 30.8 (L) 33.6 - 35.0 g/dL    RDW 44.0 35.9 - 50.0 fL    Platelet Count 246 164 - 446 K/uL    MPV 9.9 9.0 - 12.9 fL    Neutrophils-Polys 42.60 (L) 44.00 - 72.00 %    Lymphocytes 47.10 (H) 22.00 - 41.00 %    Monocytes 8.60 0.00 - 13.40 %    Eosinophils 1.10 0.00 - 6.90 %    Basophils 0.40 0.00 - 1.80 %    Immature Granulocytes 0.20 0.00 - 0.90 %    Nucleated RBC 0.00 /100 WBC    Neutrophils (Absolute) 3.43 2.00 - 7.15 K/uL    Lymphs (Absolute) 3.80 1.00 - 4.80 K/uL    Monos (Absolute) 0.69 0.00 - 0.85 K/uL    Eos (Absolute) 0.09 0.00 - 0.51 K/uL    Baso (Absolute) 0.03 0.00 - 0.12 K/uL    Immature Granulocytes (abs) 0.02 0.00 - 0.11 K/uL    NRBC (Absolute) 0.00 K/uL   COMP METABOLIC PANEL   Result Value Ref Range    Sodium 140 135 - 145 mmol/L    Potassium 4.0 3.6 - 5.5 mmol/L    Chloride 100 96 - 112 mmol/L    Co2 27 20 - 33 mmol/L    Anion Gap 13.0 (H) 0.0 - 11.9    Glucose 92 65 - 99 mg/dL    Bun 30 (H) 8 - 22 mg/dL    Creatinine 1.54 (H) 0.50 - 1.40 mg/dL    Calcium 9.4 8.5 - 10.5 mg/dL    AST(SGOT) 19 12 - 45 U/L    ALT(SGPT) 11 2 - 50 U/L    Alkaline Phosphatase 41 30 - 99 U/L    Total Bilirubin 0.3 0.1 - 1.5 mg/dL    Albumin 4.4 3.2 - 4.9 g/dL    Total Protein 7.3 6.0 - 8.2 g/dL    Globulin 2.9 1.9 - 3.5 g/dL    A-G Ratio 1.5 g/dL   TROPONIN   Result Value Ref Range    Troponin T 12 6 - 19 ng/L    MAGNESIUM   Result Value Ref Range    Magnesium 1.2 (L) 1.5 - 2.5 mg/dL   ESTIMATED GFR   Result Value Ref Range    GFR If  44 (A) >60 mL/min/1.73 m 2    GFR If Non  37 (A) >60 mL/min/1.73 m 2   EKG (NOW)   Result Value Ref Range    Report       [facility] Emergency Dept.    Test Date:  2019  Pt Name:    LEE SCHNEIDER                Department: ER  MRN:        [id number]                      Room:       [facility]  Gender:     Female                       Technician: [id number]  :        1975                   Requested By:IMMANUEL GUZMAN  Order #:    [id number]                    Reading MD:    Measurements  Intervals                                Axis  Rate:       72                           P:          32  CO:         174                          QRS:        -11  QRSD:       102                          T:          -4  QT:         422  QTc:        462    Interpretive Statements  Sinus rhythm  Nonspecific T abnormalities, anterior leads  Compared to ECG 09/10/2019 18:05:54  ST (T wave) deviation no longer present  T-wave abnormality still present          RADIOLOGY  The radiologist's interpretation of all radiological studies have been reviewed by me.     No orders to display          COURSE & MEDICAL DECISION MAKING  Nursing notes, VS, PMSFHx reviewed in chart.     Review of past medical records shows the patient was admitted to this hospital September 10, 2019 for reported seizures and diagnosed with psychogenic nonepileptic seizure and patient discharged on 2019.  She had CTA of head and neck performed on September 10, 2019 without evidence for acute abnormality.  Echocardiogram was performed on  without evidence of significant abnormality.      Differential diagnoses considered include but are not limited to: Seizure, syncope, dysrhythmia, pseudoseizure, anxiety       History and physical exam as above.  EKG showed sinus rhythm with  nonspecific T wave changes that appear similar to prior EKG.  Labs were fairly unremarkable except for mild renal dysfunction and hypomagnesemia.  Patient was monitored in the ED with no evidence for seizure.  Her right foot without clinical signs of infection.  I discussed the findings with the patient.  She is noted to be alert, in no acute distress and nontoxic in appearance without focal neurological findings.  She had recent admission and extensive work-up for her reported seizures.  Low clinical suspicion at this time for acute serious pathology given the history/exam/findings.  I discussed with her importance of outpatient follow-up as well as seizure precautions.  Return to ED precautions were given.  Patient verbalized understanding and agreed with plan of care with no further questions or concerns.      The patient is referred to a primary physician for blood pressure management, diabetic screening, and for all other preventative health concerns.       FINAL IMPRESSION  1. Seizure-like activity (HCC) Active   2. Right foot pain Active          DISPOSITION  Patient will be discharged home in stable condition.       FOLLOW UP  Vika Norton M.D.  [address] [place] [id number] [phone]    Call in 3 days      [facility], Emergency Dept  [facility] [facility] [id number] [phone]    If symptoms worsen         OUTPATIENT MEDICATIONS  Discharge Medication List as of 9/27/2019 11:10 PM             Electronically signed by: Baldo Conklin, 9/27/2019 10:02 PM      Portions of this record were made with voice recognition software.  Despite my review, spelling/grammar/context errors may still remain.  Interpretation of this chart should be taken in this context.

## 2022-07-29 NOTE — ED ADULT NURSE NOTE - OBJECTIVE STATEMENT
pt received in room 14. pt is 61y female, pt is AxOx4 and ambulatory. PMH of HTN and currently has valvula CA. pt noticed bleeding from valvula today at 3pm. she states in the morning it typically does that but today noticed heavier bleeding and went through 3 pads in one hour. pt denies pain and states blood is bright red, no clots. pt denies cp, sob, n/v/d, headaches, dizziness and trouble/burning when urinating. pt RR are even and unlabored. Skin is dry and intact. 20G IV placed in left AC and labs were drawn and sent.  is at bedside. Call bell within reach. Will continue to monitor .

## 2022-07-30 DIAGNOSIS — C51.9 MALIGNANT NEOPLASM OF VULVA, UNSPECIFIED: ICD-10-CM

## 2022-08-02 ENCOUNTER — NON-APPOINTMENT (OUTPATIENT)
Age: 62
End: 2022-08-02

## 2022-08-04 ENCOUNTER — RESULT REVIEW (OUTPATIENT)
Age: 62
End: 2022-08-04

## 2022-08-04 ENCOUNTER — APPOINTMENT (OUTPATIENT)
Dept: MAMMOGRAPHY | Facility: IMAGING CENTER | Age: 62
End: 2022-08-04

## 2022-08-04 ENCOUNTER — OUTPATIENT (OUTPATIENT)
Dept: OUTPATIENT SERVICES | Facility: HOSPITAL | Age: 62
LOS: 1 days | End: 2022-08-04
Payer: COMMERCIAL

## 2022-08-04 DIAGNOSIS — Z00.8 ENCOUNTER FOR OTHER GENERAL EXAMINATION: ICD-10-CM

## 2022-08-04 PROCEDURE — 77067 SCR MAMMO BI INCL CAD: CPT

## 2022-08-04 PROCEDURE — 77063 BREAST TOMOSYNTHESIS BI: CPT

## 2022-08-04 PROCEDURE — 77063 BREAST TOMOSYNTHESIS BI: CPT | Mod: 26

## 2022-08-04 PROCEDURE — 77067 SCR MAMMO BI INCL CAD: CPT | Mod: 26

## 2022-08-24 PROCEDURE — 77338 DESIGN MLC DEVICE FOR IMRT: CPT | Mod: 26

## 2022-08-24 PROCEDURE — 77301 RADIOTHERAPY DOSE PLAN IMRT: CPT | Mod: 26

## 2022-08-24 PROCEDURE — 77300 RADIATION THERAPY DOSE PLAN: CPT | Mod: 26

## 2022-08-25 ENCOUNTER — NON-APPOINTMENT (OUTPATIENT)
Age: 62
End: 2022-08-25

## 2022-08-29 PROCEDURE — G6002: CPT | Mod: 26

## 2022-08-30 ENCOUNTER — APPOINTMENT (OUTPATIENT)
Dept: HEMATOLOGY ONCOLOGY | Facility: CLINIC | Age: 62
End: 2022-08-30

## 2022-08-30 ENCOUNTER — RESULT REVIEW (OUTPATIENT)
Age: 62
End: 2022-08-30

## 2022-08-30 ENCOUNTER — APPOINTMENT (OUTPATIENT)
Dept: INFUSION THERAPY | Facility: HOSPITAL | Age: 62
End: 2022-08-30

## 2022-08-30 ENCOUNTER — NON-APPOINTMENT (OUTPATIENT)
Age: 62
End: 2022-08-30

## 2022-08-30 VITALS
HEART RATE: 68 BPM | RESPIRATION RATE: 16 BRPM | DIASTOLIC BLOOD PRESSURE: 75 MMHG | SYSTOLIC BLOOD PRESSURE: 154 MMHG | TEMPERATURE: 97.52 F | OXYGEN SATURATION: 98 % | BODY MASS INDEX: 25.4 KG/M2 | WEIGHT: 138.89 LBS

## 2022-08-30 DIAGNOSIS — Z51.11 ENCOUNTER FOR ANTINEOPLASTIC CHEMOTHERAPY: ICD-10-CM

## 2022-08-30 DIAGNOSIS — E86.0 DEHYDRATION: ICD-10-CM

## 2022-08-30 DIAGNOSIS — R11.2 NAUSEA WITH VOMITING, UNSPECIFIED: ICD-10-CM

## 2022-08-30 LAB
ALBUMIN SERPL ELPH-MCNC: 4.3 G/DL — SIGNIFICANT CHANGE UP (ref 3.3–5)
ALP SERPL-CCNC: 70 U/L — SIGNIFICANT CHANGE UP (ref 40–120)
ALT FLD-CCNC: 12 U/L — SIGNIFICANT CHANGE UP (ref 10–45)
ANION GAP SERPL CALC-SCNC: 13 MMOL/L — SIGNIFICANT CHANGE UP (ref 5–17)
AST SERPL-CCNC: 17 U/L — SIGNIFICANT CHANGE UP (ref 10–40)
BASOPHILS # BLD AUTO: 0.05 K/UL — SIGNIFICANT CHANGE UP (ref 0–0.2)
BASOPHILS NFR BLD AUTO: 1 % — SIGNIFICANT CHANGE UP (ref 0–2)
BILIRUB SERPL-MCNC: 0.2 MG/DL — SIGNIFICANT CHANGE UP (ref 0.2–1.2)
BUN SERPL-MCNC: 14 MG/DL — SIGNIFICANT CHANGE UP (ref 7–23)
CALCIUM SERPL-MCNC: 9.7 MG/DL — SIGNIFICANT CHANGE UP (ref 8.4–10.5)
CHLORIDE SERPL-SCNC: 105 MMOL/L — SIGNIFICANT CHANGE UP (ref 96–108)
CO2 SERPL-SCNC: 26 MMOL/L — SIGNIFICANT CHANGE UP (ref 22–31)
CREAT SERPL-MCNC: 0.81 MG/DL — SIGNIFICANT CHANGE UP (ref 0.5–1.3)
EGFR: 83 ML/MIN/1.73M2 — SIGNIFICANT CHANGE UP
EOSINOPHIL # BLD AUTO: 0.14 K/UL — SIGNIFICANT CHANGE UP (ref 0–0.5)
EOSINOPHIL NFR BLD AUTO: 2.9 % — SIGNIFICANT CHANGE UP (ref 0–6)
GLUCOSE SERPL-MCNC: 87 MG/DL — SIGNIFICANT CHANGE UP (ref 70–99)
HCT VFR BLD CALC: 37.2 % — SIGNIFICANT CHANGE UP (ref 34.5–45)
HGB BLD-MCNC: 12.5 G/DL — SIGNIFICANT CHANGE UP (ref 11.5–15.5)
IMM GRANULOCYTES NFR BLD AUTO: 0.2 % — SIGNIFICANT CHANGE UP (ref 0–1.5)
LYMPHOCYTES # BLD AUTO: 1.29 K/UL — SIGNIFICANT CHANGE UP (ref 1–3.3)
LYMPHOCYTES # BLD AUTO: 26.3 % — SIGNIFICANT CHANGE UP (ref 13–44)
MAGNESIUM SERPL-MCNC: 2.2 MG/DL — SIGNIFICANT CHANGE UP (ref 1.6–2.6)
MCHC RBC-ENTMCNC: 32.8 PG — SIGNIFICANT CHANGE UP (ref 27–34)
MCHC RBC-ENTMCNC: 33.6 G/DL — SIGNIFICANT CHANGE UP (ref 32–36)
MCV RBC AUTO: 97.6 FL — SIGNIFICANT CHANGE UP (ref 80–100)
MONOCYTES # BLD AUTO: 0.47 K/UL — SIGNIFICANT CHANGE UP (ref 0–0.9)
MONOCYTES NFR BLD AUTO: 9.6 % — SIGNIFICANT CHANGE UP (ref 2–14)
NEUTROPHILS # BLD AUTO: 2.94 K/UL — SIGNIFICANT CHANGE UP (ref 1.8–7.4)
NEUTROPHILS NFR BLD AUTO: 60 % — SIGNIFICANT CHANGE UP (ref 43–77)
NRBC # BLD: 0 /100 WBCS — SIGNIFICANT CHANGE UP (ref 0–0)
PLATELET # BLD AUTO: 201 K/UL — SIGNIFICANT CHANGE UP (ref 150–400)
POTASSIUM SERPL-MCNC: 4.3 MMOL/L — SIGNIFICANT CHANGE UP (ref 3.5–5.3)
POTASSIUM SERPL-SCNC: 4.3 MMOL/L — SIGNIFICANT CHANGE UP (ref 3.5–5.3)
PROT SERPL-MCNC: 6.8 G/DL — SIGNIFICANT CHANGE UP (ref 6–8.3)
RBC # BLD: 3.81 M/UL — SIGNIFICANT CHANGE UP (ref 3.8–5.2)
RBC # FLD: 12.3 % — SIGNIFICANT CHANGE UP (ref 10.3–14.5)
SODIUM SERPL-SCNC: 144 MMOL/L — SIGNIFICANT CHANGE UP (ref 135–145)
WBC # BLD: 4.9 K/UL — SIGNIFICANT CHANGE UP (ref 3.8–10.5)
WBC # FLD AUTO: 4.9 K/UL — SIGNIFICANT CHANGE UP (ref 3.8–10.5)

## 2022-08-30 PROCEDURE — G6002: CPT | Mod: 26

## 2022-08-30 NOTE — REVIEW OF SYSTEMS
[Constipation: Grade 0] : Constipation: Grade 0 [Diarrhea: Grade 0] : Diarrhea: Grade 0 [Nausea: Grade 0] : Nausea: Grade 0 [Vomiting: Grade 0] : Vomiting: Grade 0 [Fatigue: Grade 0] : Fatigue: Grade 0 [Hematuria: Grade 0] : Hematuria: Grade 0 [Urinary Incontinence: Grade 0] : Urinary Incontinence: Grade 0  [Urinary Retention: Grade 0] : Urinary Retention: Grade 0 [Urinary Tract Pain: Grade 0] : Urinary Tract Pain: Grade 0 [Urinary Urgency: Grade 0] : Urinary Urgency: Grade 0 [Urinary Frequency: Grade 0] : Urinary Frequency: Grade 0 [Pruritus: Grade 0] : Pruritus: Grade 0 [Skin Hyperpigmentation: Grade 0] : Skin Hyperpigmentation: Grade 0 [Dermatitis Radiation: Grade 0] : Dermatitis Radiation: Grade 0

## 2022-08-31 PROCEDURE — G6002: CPT | Mod: 26

## 2022-09-01 ENCOUNTER — OUTPATIENT (OUTPATIENT)
Dept: OUTPATIENT SERVICES | Facility: HOSPITAL | Age: 62
LOS: 1 days | Discharge: HOME | End: 2022-09-01

## 2022-09-01 DIAGNOSIS — C51.9 MALIGNANT NEOPLASM OF VULVA, UNSPECIFIED: ICD-10-CM

## 2022-09-01 PROCEDURE — G6002: CPT | Mod: 26

## 2022-09-02 PROCEDURE — 77427 RADIATION TX MANAGEMENT X5: CPT

## 2022-09-02 PROCEDURE — 77014: CPT | Mod: 26

## 2022-09-06 ENCOUNTER — NON-APPOINTMENT (OUTPATIENT)
Age: 62
End: 2022-09-06

## 2022-09-06 VITALS
WEIGHT: 137.9 LBS | OXYGEN SATURATION: 85 % | TEMPERATURE: 97 F | SYSTOLIC BLOOD PRESSURE: 170 MMHG | RESPIRATION RATE: 16 BRPM | DIASTOLIC BLOOD PRESSURE: 76 MMHG | HEART RATE: 85 BPM | BODY MASS INDEX: 25.22 KG/M2

## 2022-09-06 PROCEDURE — G6002: CPT | Mod: 26

## 2022-09-07 ENCOUNTER — RESULT REVIEW (OUTPATIENT)
Age: 62
End: 2022-09-07

## 2022-09-07 ENCOUNTER — APPOINTMENT (OUTPATIENT)
Dept: INFUSION THERAPY | Facility: HOSPITAL | Age: 62
End: 2022-09-07

## 2022-09-07 ENCOUNTER — APPOINTMENT (OUTPATIENT)
Dept: HEMATOLOGY ONCOLOGY | Facility: CLINIC | Age: 62
End: 2022-09-07

## 2022-09-07 LAB
ALBUMIN SERPL ELPH-MCNC: 4.5 G/DL — SIGNIFICANT CHANGE UP (ref 3.3–5)
ALP SERPL-CCNC: 68 U/L — SIGNIFICANT CHANGE UP (ref 40–120)
ALT FLD-CCNC: 16 U/L — SIGNIFICANT CHANGE UP (ref 10–45)
ANION GAP SERPL CALC-SCNC: 10 MMOL/L — SIGNIFICANT CHANGE UP (ref 5–17)
AST SERPL-CCNC: 16 U/L — SIGNIFICANT CHANGE UP (ref 10–40)
BASOPHILS # BLD AUTO: 0.03 K/UL — SIGNIFICANT CHANGE UP (ref 0–0.2)
BASOPHILS NFR BLD AUTO: 0.5 % — SIGNIFICANT CHANGE UP (ref 0–2)
BILIRUB SERPL-MCNC: <0.2 MG/DL — SIGNIFICANT CHANGE UP (ref 0.2–1.2)
BUN SERPL-MCNC: 18 MG/DL — SIGNIFICANT CHANGE UP (ref 7–23)
CALCIUM SERPL-MCNC: 9.6 MG/DL — SIGNIFICANT CHANGE UP (ref 8.4–10.5)
CHLORIDE SERPL-SCNC: 103 MMOL/L — SIGNIFICANT CHANGE UP (ref 96–108)
CO2 SERPL-SCNC: 28 MMOL/L — SIGNIFICANT CHANGE UP (ref 22–31)
CREAT SERPL-MCNC: 0.78 MG/DL — SIGNIFICANT CHANGE UP (ref 0.5–1.3)
EGFR: 87 ML/MIN/1.73M2 — SIGNIFICANT CHANGE UP
EOSINOPHIL # BLD AUTO: 0.29 K/UL — SIGNIFICANT CHANGE UP (ref 0–0.5)
EOSINOPHIL NFR BLD AUTO: 4.4 % — SIGNIFICANT CHANGE UP (ref 0–6)
GLUCOSE SERPL-MCNC: 96 MG/DL — SIGNIFICANT CHANGE UP (ref 70–99)
HCT VFR BLD CALC: 36.6 % — SIGNIFICANT CHANGE UP (ref 34.5–45)
HGB BLD-MCNC: 12.4 G/DL — SIGNIFICANT CHANGE UP (ref 11.5–15.5)
IMM GRANULOCYTES NFR BLD AUTO: 0.5 % — SIGNIFICANT CHANGE UP (ref 0–1.5)
LYMPHOCYTES # BLD AUTO: 0.9 K/UL — LOW (ref 1–3.3)
LYMPHOCYTES # BLD AUTO: 13.7 % — SIGNIFICANT CHANGE UP (ref 13–44)
MAGNESIUM SERPL-MCNC: 2.1 MG/DL — SIGNIFICANT CHANGE UP (ref 1.6–2.6)
MCHC RBC-ENTMCNC: 33.1 PG — SIGNIFICANT CHANGE UP (ref 27–34)
MCHC RBC-ENTMCNC: 33.9 G/DL — SIGNIFICANT CHANGE UP (ref 32–36)
MCV RBC AUTO: 97.6 FL — SIGNIFICANT CHANGE UP (ref 80–100)
MONOCYTES # BLD AUTO: 0.71 K/UL — SIGNIFICANT CHANGE UP (ref 0–0.9)
MONOCYTES NFR BLD AUTO: 10.8 % — SIGNIFICANT CHANGE UP (ref 2–14)
NEUTROPHILS # BLD AUTO: 4.61 K/UL — SIGNIFICANT CHANGE UP (ref 1.8–7.4)
NEUTROPHILS NFR BLD AUTO: 70.1 % — SIGNIFICANT CHANGE UP (ref 43–77)
NRBC # BLD: 0 /100 WBCS — SIGNIFICANT CHANGE UP (ref 0–0)
PLATELET # BLD AUTO: 181 K/UL — SIGNIFICANT CHANGE UP (ref 150–400)
POTASSIUM SERPL-MCNC: 4.8 MMOL/L — SIGNIFICANT CHANGE UP (ref 3.5–5.3)
POTASSIUM SERPL-SCNC: 4.8 MMOL/L — SIGNIFICANT CHANGE UP (ref 3.5–5.3)
PROT SERPL-MCNC: 6.7 G/DL — SIGNIFICANT CHANGE UP (ref 6–8.3)
RBC # BLD: 3.75 M/UL — LOW (ref 3.8–5.2)
RBC # FLD: 11.9 % — SIGNIFICANT CHANGE UP (ref 10.3–14.5)
SODIUM SERPL-SCNC: 142 MMOL/L — SIGNIFICANT CHANGE UP (ref 135–145)
WBC # BLD: 6.57 K/UL — SIGNIFICANT CHANGE UP (ref 3.8–10.5)
WBC # FLD AUTO: 6.57 K/UL — SIGNIFICANT CHANGE UP (ref 3.8–10.5)

## 2022-09-07 PROCEDURE — G6002: CPT | Mod: 26

## 2022-09-07 PROCEDURE — 99213 OFFICE O/P EST LOW 20 MIN: CPT

## 2022-09-08 DIAGNOSIS — Z51.11 ENCOUNTER FOR ANTINEOPLASTIC CHEMOTHERAPY: ICD-10-CM

## 2022-09-08 DIAGNOSIS — R11.2 NAUSEA WITH VOMITING, UNSPECIFIED: ICD-10-CM

## 2022-09-08 DIAGNOSIS — E86.0 DEHYDRATION: ICD-10-CM

## 2022-09-08 PROCEDURE — G6002: CPT | Mod: 26

## 2022-09-08 NOTE — REVIEW OF SYSTEMS
[Fatigue] : fatigue [Constipation] : constipation [Negative] : Allergic/Immunologic [Fever] : no fever [Dysphagia] : no dysphagia [Mucosal Pain] : no mucosal pain [Chest Pain] : no chest pain [Lower Ext Edema] : no lower extremity edema [Shortness Of Breath] : no shortness of breath [Cough] : no cough [Abdominal Pain] : no abdominal pain [Diarrhea] : no diarrhea [Dysuria] : no dysuria [Vaginal Discharge] : no vaginal discharge [Joint Pain] : no joint pain [Muscle Pain] : no muscle pain [Skin Rash] : no skin rash [Dizziness] : no dizziness [Difficulty Walking] : no difficulty walking [Anxiety] : no anxiety [Depression] : no depression [Hot Flashes] : no hot flashes [Easy Bleeding] : no tendency for easy bleeding

## 2022-09-08 NOTE — ASSESSMENT
[FreeTextEntry1] : 61 year old woman with vulvar cancer receiving treatment with RT and weekly Cisplatin.\par Cycle #2 today, tolerating treatment well.\par Will arrange for consult with palliative care for medical cannabis to help with symptom management.\par Tylenol as needed for pain.\par Continue laxatives as needed for constipation.  Will stop if diarrhea occurs.\par Encouraged frequent PO hydration.\par Check labs today - CBC, CMP, Mg.\par RTC 2 weeks.\par

## 2022-09-08 NOTE — REASON FOR VISIT
[Follow-Up Visit] : a follow-up [Pre-Treatment Visit] : a pre-treatment [Spouse] : spouse [FreeTextEntry2] : vulvar mass

## 2022-09-08 NOTE — HISTORY OF PRESENT ILLNESS
[Disease: _____________________] : Disease: [unfilled] [AJCC Stage: ____] : AJCC Stage: [unfilled] [de-identified] : 61 year old woman with hx of HTN, celiac disease presenting for initial consultation of vulvar mass.\par \par Pt states she was diagnosed with TATYANA 3 10/2020 by Dr. Jane and was started on Aldara. She returned to her clinic 3 months ago as patient discovered a vaginal cyst. She had a biopsy performed 6/23/22.\par \par MR Pelvis 6/30/22: \par IMPRESSION:\par Large vulvar mass 5.5 x 4.8 x 2.5 cm  extends to the anterior lower vagina and distal urethra.\par Right inguinal lymphadenopathy  1.8 x 1.3 cm highly suspicious for hermann involvement by carcinoma\par \par PET/CT 6/2022\par IMPRESSION: Abnormal FDG-PET/CT scan.\par 1. FDG avidity in bilateral vulva compatible with newly diagnosed malignancy, with extension of FDG avidity into the vaginal region concerning for disease involvement.\par 2. FDG avid right inguinal lymph nodes compatible with metastases. Mildly FDG avid single left inguinal lymph node concerning for additional metastatic disease; this may be further evaluated with ultrasound and possible FNA biopsy as indicated.\par Feeling well. Endorses slight fatigue, felt it may be related to work. Appetite well but endorses weight loss,  States had painful urination a over a year but has improved. Denies fevers, SOB, CP, AP, hematuria, \par slight bleeding with wiping and associated pain. \par \par LMP 2008  [de-identified] : Squamous Cell carcinoma, vulvar [FreeTextEntry1] : RT and weekly Cisplatin [de-identified] : Patient is here for follow up and cycle #2 Cisplatin.  Overall she is tolerating treatment well so far.  She has some discomfort in the vulvar area and is looking to try medical cannabis.  Appetite is ok so far.  Denies nausea, vomiting,  She has had no diarrhea but does have constipation, relieved with OTC laxatives. Occasional mild spotting, no heavy bleeding. Denies fever, chills, abdominal pain, swelling.

## 2022-09-08 NOTE — PHYSICAL EXAM
[Fully active, able to carry on all pre-disease performance without restriction] : Status 0 - Fully active, able to carry on all pre-disease performance without restriction [Normal] : affect appropriate [de-identified] : Vulvar mass, appears 5 x 3 cm

## 2022-09-12 PROCEDURE — G6002: CPT | Mod: 26

## 2022-09-13 ENCOUNTER — APPOINTMENT (OUTPATIENT)
Dept: INFUSION THERAPY | Facility: HOSPITAL | Age: 62
End: 2022-09-13

## 2022-09-13 ENCOUNTER — RESULT REVIEW (OUTPATIENT)
Age: 62
End: 2022-09-13

## 2022-09-13 ENCOUNTER — APPOINTMENT (OUTPATIENT)
Dept: RADIATION ONCOLOGY | Facility: CLINIC | Age: 62
End: 2022-09-13

## 2022-09-13 ENCOUNTER — NON-APPOINTMENT (OUTPATIENT)
Age: 62
End: 2022-09-13

## 2022-09-13 LAB
BASOPHILS # BLD AUTO: 0.04 K/UL — SIGNIFICANT CHANGE UP (ref 0–0.2)
BASOPHILS NFR BLD AUTO: 0.7 % — SIGNIFICANT CHANGE UP (ref 0–2)
EOSINOPHIL # BLD AUTO: 0.3 K/UL — SIGNIFICANT CHANGE UP (ref 0–0.5)
EOSINOPHIL NFR BLD AUTO: 5.4 % — SIGNIFICANT CHANGE UP (ref 0–6)
HCT VFR BLD CALC: 34.6 % — SIGNIFICANT CHANGE UP (ref 34.5–45)
HGB BLD-MCNC: 11.6 G/DL — SIGNIFICANT CHANGE UP (ref 11.5–15.5)
IMM GRANULOCYTES NFR BLD AUTO: 1.3 % — SIGNIFICANT CHANGE UP (ref 0–1.5)
LYMPHOCYTES # BLD AUTO: 0.5 K/UL — LOW (ref 1–3.3)
LYMPHOCYTES # BLD AUTO: 9 % — LOW (ref 13–44)
MCHC RBC-ENTMCNC: 32.6 PG — SIGNIFICANT CHANGE UP (ref 27–34)
MCHC RBC-ENTMCNC: 33.5 G/DL — SIGNIFICANT CHANGE UP (ref 32–36)
MCV RBC AUTO: 97.2 FL — SIGNIFICANT CHANGE UP (ref 80–100)
MONOCYTES # BLD AUTO: 0.5 K/UL — SIGNIFICANT CHANGE UP (ref 0–0.9)
MONOCYTES NFR BLD AUTO: 9 % — SIGNIFICANT CHANGE UP (ref 2–14)
NEUTROPHILS # BLD AUTO: 4.16 K/UL — SIGNIFICANT CHANGE UP (ref 1.8–7.4)
NEUTROPHILS NFR BLD AUTO: 74.6 % — SIGNIFICANT CHANGE UP (ref 43–77)
NRBC # BLD: 0 /100 WBCS — SIGNIFICANT CHANGE UP (ref 0–0)
PLATELET # BLD AUTO: 140 K/UL — LOW (ref 150–400)
RBC # BLD: 3.56 M/UL — LOW (ref 3.8–5.2)
RBC # FLD: 11.8 % — SIGNIFICANT CHANGE UP (ref 10.3–14.5)
WBC # BLD: 5.57 K/UL — SIGNIFICANT CHANGE UP (ref 3.8–10.5)
WBC # FLD AUTO: 5.57 K/UL — SIGNIFICANT CHANGE UP (ref 3.8–10.5)

## 2022-09-13 PROCEDURE — 77427 RADIATION TX MANAGEMENT X5: CPT

## 2022-09-13 PROCEDURE — G6002: CPT | Mod: 26

## 2022-09-14 LAB
ALBUMIN SERPL ELPH-MCNC: 4 G/DL — SIGNIFICANT CHANGE UP (ref 3.3–5)
ALP SERPL-CCNC: 64 U/L — SIGNIFICANT CHANGE UP (ref 40–120)
ALT FLD-CCNC: 13 U/L — SIGNIFICANT CHANGE UP (ref 10–45)
ANION GAP SERPL CALC-SCNC: 19 MMOL/L — HIGH (ref 5–17)
AST SERPL-CCNC: 16 U/L — SIGNIFICANT CHANGE UP (ref 10–40)
BILIRUB SERPL-MCNC: <0.2 MG/DL — SIGNIFICANT CHANGE UP (ref 0.2–1.2)
BUN SERPL-MCNC: 19 MG/DL — SIGNIFICANT CHANGE UP (ref 7–23)
CALCIUM SERPL-MCNC: 9 MG/DL — SIGNIFICANT CHANGE UP (ref 8.4–10.5)
CHLORIDE SERPL-SCNC: 105 MMOL/L — SIGNIFICANT CHANGE UP (ref 96–108)
CO2 SERPL-SCNC: 19 MMOL/L — LOW (ref 22–31)
CREAT SERPL-MCNC: 0.76 MG/DL — SIGNIFICANT CHANGE UP (ref 0.5–1.3)
EGFR: 90 ML/MIN/1.73M2 — SIGNIFICANT CHANGE UP
GLUCOSE SERPL-MCNC: 90 MG/DL — SIGNIFICANT CHANGE UP (ref 70–99)
MAGNESIUM SERPL-MCNC: 1.9 MG/DL — SIGNIFICANT CHANGE UP (ref 1.6–2.6)
POTASSIUM SERPL-MCNC: 4.4 MMOL/L — SIGNIFICANT CHANGE UP (ref 3.5–5.3)
POTASSIUM SERPL-SCNC: 4.4 MMOL/L — SIGNIFICANT CHANGE UP (ref 3.5–5.3)
PROT SERPL-MCNC: 6.1 G/DL — SIGNIFICANT CHANGE UP (ref 6–8.3)
SODIUM SERPL-SCNC: 143 MMOL/L — SIGNIFICANT CHANGE UP (ref 135–145)

## 2022-09-14 PROCEDURE — G6002: CPT | Mod: 26

## 2022-09-15 PROCEDURE — G6002: CPT | Mod: 26

## 2022-09-16 ENCOUNTER — APPOINTMENT (OUTPATIENT)
Dept: RADIATION ONCOLOGY | Facility: CLINIC | Age: 62
End: 2022-09-16

## 2022-09-16 PROCEDURE — G6002: CPT | Mod: 26

## 2022-09-16 PROCEDURE — 99203 OFFICE O/P NEW LOW 30 MIN: CPT | Mod: 25,95

## 2022-09-16 RX ORDER — CYANOCOBALAMIN (VITAMIN B-12) 500 MCG
400 LOZENGE ORAL DAILY
Qty: 30 | Refills: 5 | Status: DISCONTINUED | COMMUNITY
Start: 2022-07-05 | End: 2022-09-16

## 2022-09-16 RX ORDER — COLD-HOT PACK
50 EACH MISCELLANEOUS DAILY
Refills: 0 | Status: DISCONTINUED | COMMUNITY
Start: 2022-07-05 | End: 2022-09-16

## 2022-09-16 RX ORDER — ESTRADIOL 0.1 MG/G
0.1 CREAM VAGINAL
Qty: 1 | Refills: 1 | Status: DISCONTINUED | COMMUNITY
Start: 2019-03-09 | End: 2022-09-16

## 2022-09-16 NOTE — HISTORY OF PRESENT ILLNESS
[de-identified] : Squamous Cell carcinoma, vulvar [FreeTextEntry1] : 61 year old female under treatmetn for vulvar cancer who requested and consented to telehealth consult for medical cannabis..\par \par Pt was diagnosed with TATYANA 3 10/2020 by Dr. Jane and was started on Aldara. 3 months ago she was seen for a "vaginal cyst" the patient palpated.\par  She had a biopsy performed 6/23/22 showing Squamous cell carcinoma at least in Situ, invasion not excluded.\par \par MR Pelvis 6/30/22: \par Large vulvar mass 5.5 x 4.8 x 2.5 cm  extends to the anterior lower vagina and distal urethra.\par Right inguinal lymphadenopathy  1.8 x 1.3 cm highly suspicious for hermann involvement by carcinoma\par \par PET/CT 6/2022\par 1. FDG avidity in bilateral vulva compatible with newly diagnosed malignancy, with extension of FDG avidity into the vaginal region concerning for disease involvement.\par 2. FDG avid right inguinal lymph nodes compatible with metastases. Mildly FDG avid single left inguinal lymph node concerning for additional metastatic disease; this may be further evaluated with ultrasound and possible FNA biopsy as indicated.\par \par She is receiving neoadjuvant Cisplatin and RT currently.  She has some discomfort in the vulvar area and nausea, she is looking to try medical cannabis.  Reports constipation relieved with OTC laxatives.  Endorses slight fatigue, felt it may be related to work. Appetite well but weight loss since she has nausea and it prevents her from eating and drinking.  Denies fevers, SOB, CP, AP, hematuria, slight bleeding with wiping and associated pain. 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

## 2022-09-19 PROCEDURE — 77014: CPT | Mod: 26

## 2022-09-20 ENCOUNTER — RESULT REVIEW (OUTPATIENT)
Age: 62
End: 2022-09-20

## 2022-09-20 ENCOUNTER — APPOINTMENT (OUTPATIENT)
Dept: HEMATOLOGY ONCOLOGY | Facility: CLINIC | Age: 62
End: 2022-09-20

## 2022-09-20 ENCOUNTER — APPOINTMENT (OUTPATIENT)
Dept: INFUSION THERAPY | Facility: HOSPITAL | Age: 62
End: 2022-09-20

## 2022-09-20 ENCOUNTER — NON-APPOINTMENT (OUTPATIENT)
Age: 62
End: 2022-09-20

## 2022-09-20 VITALS
TEMPERATURE: 96.98 F | DIASTOLIC BLOOD PRESSURE: 66 MMHG | HEART RATE: 70 BPM | BODY MASS INDEX: 25.98 KG/M2 | OXYGEN SATURATION: 100 % | RESPIRATION RATE: 17 BRPM | HEIGHT: 62 IN | WEIGHT: 141.2 LBS | SYSTOLIC BLOOD PRESSURE: 146 MMHG

## 2022-09-20 LAB
ALBUMIN SERPL ELPH-MCNC: 3.9 G/DL — SIGNIFICANT CHANGE UP (ref 3.3–5)
ALP SERPL-CCNC: 58 U/L — SIGNIFICANT CHANGE UP (ref 40–120)
ALT FLD-CCNC: 16 U/L — SIGNIFICANT CHANGE UP (ref 10–45)
ANION GAP SERPL CALC-SCNC: 10 MMOL/L — SIGNIFICANT CHANGE UP (ref 5–17)
AST SERPL-CCNC: 25 U/L — SIGNIFICANT CHANGE UP (ref 10–40)
BASOPHILS # BLD AUTO: 0.05 K/UL — SIGNIFICANT CHANGE UP (ref 0–0.2)
BASOPHILS NFR BLD AUTO: 1.3 % — SIGNIFICANT CHANGE UP (ref 0–2)
BILIRUB SERPL-MCNC: <0.2 MG/DL — SIGNIFICANT CHANGE UP (ref 0.2–1.2)
BUN SERPL-MCNC: 17 MG/DL — SIGNIFICANT CHANGE UP (ref 7–23)
CALCIUM SERPL-MCNC: 9.3 MG/DL — SIGNIFICANT CHANGE UP (ref 8.4–10.5)
CHLORIDE SERPL-SCNC: 106 MMOL/L — SIGNIFICANT CHANGE UP (ref 96–108)
CO2 SERPL-SCNC: 26 MMOL/L — SIGNIFICANT CHANGE UP (ref 22–31)
CREAT SERPL-MCNC: 0.78 MG/DL — SIGNIFICANT CHANGE UP (ref 0.5–1.3)
EGFR: 87 ML/MIN/1.73M2 — SIGNIFICANT CHANGE UP
EOSINOPHIL # BLD AUTO: 0.22 K/UL — SIGNIFICANT CHANGE UP (ref 0–0.5)
EOSINOPHIL NFR BLD AUTO: 5.9 % — SIGNIFICANT CHANGE UP (ref 0–6)
GLUCOSE SERPL-MCNC: 84 MG/DL — SIGNIFICANT CHANGE UP (ref 70–99)
HCT VFR BLD CALC: 30 % — LOW (ref 34.5–45)
HGB BLD-MCNC: 10.6 G/DL — LOW (ref 11.5–15.5)
IMM GRANULOCYTES NFR BLD AUTO: 2.4 % — HIGH (ref 0–0.9)
LYMPHOCYTES # BLD AUTO: 0.5 K/UL — LOW (ref 1–3.3)
LYMPHOCYTES # BLD AUTO: 13.4 % — SIGNIFICANT CHANGE UP (ref 13–44)
MAGNESIUM SERPL-MCNC: 1.9 MG/DL — SIGNIFICANT CHANGE UP (ref 1.6–2.6)
MCHC RBC-ENTMCNC: 33 PG — SIGNIFICANT CHANGE UP (ref 27–34)
MCHC RBC-ENTMCNC: 35.3 G/DL — SIGNIFICANT CHANGE UP (ref 32–36)
MCV RBC AUTO: 93.5 FL — SIGNIFICANT CHANGE UP (ref 80–100)
MONOCYTES # BLD AUTO: 0.44 K/UL — SIGNIFICANT CHANGE UP (ref 0–0.9)
MONOCYTES NFR BLD AUTO: 11.8 % — SIGNIFICANT CHANGE UP (ref 2–14)
NEUTROPHILS # BLD AUTO: 2.44 K/UL — SIGNIFICANT CHANGE UP (ref 1.8–7.4)
NEUTROPHILS NFR BLD AUTO: 65.2 % — SIGNIFICANT CHANGE UP (ref 43–77)
NRBC # BLD: 0 /100 WBCS — SIGNIFICANT CHANGE UP (ref 0–0)
PLATELET # BLD AUTO: 141 K/UL — LOW (ref 150–400)
POTASSIUM SERPL-MCNC: 6 MMOL/L — HIGH (ref 3.5–5.3)
POTASSIUM SERPL-SCNC: 6 MMOL/L — HIGH (ref 3.5–5.3)
PROT SERPL-MCNC: 6.1 G/DL — SIGNIFICANT CHANGE UP (ref 6–8.3)
RBC # BLD: 3.21 M/UL — LOW (ref 3.8–5.2)
RBC # FLD: 11.8 % — SIGNIFICANT CHANGE UP (ref 10.3–14.5)
SODIUM SERPL-SCNC: 142 MMOL/L — SIGNIFICANT CHANGE UP (ref 135–145)
WBC # BLD: 3.74 K/UL — LOW (ref 3.8–10.5)
WBC # FLD AUTO: 3.74 K/UL — LOW (ref 3.8–10.5)

## 2022-09-20 PROCEDURE — G6002: CPT | Mod: 26

## 2022-09-20 PROCEDURE — 77427 RADIATION TX MANAGEMENT X5: CPT

## 2022-09-21 PROCEDURE — G6002: CPT | Mod: 26

## 2022-09-22 PROCEDURE — G6002: CPT | Mod: 26

## 2022-09-23 PROCEDURE — G6002: CPT | Mod: 26

## 2022-09-26 PROCEDURE — 77014: CPT | Mod: 26

## 2022-09-26 PROCEDURE — 77427 RADIATION TX MANAGEMENT X5: CPT

## 2022-09-27 ENCOUNTER — LABORATORY RESULT (OUTPATIENT)
Age: 62
End: 2022-09-27

## 2022-09-27 ENCOUNTER — RESULT REVIEW (OUTPATIENT)
Age: 62
End: 2022-09-27

## 2022-09-27 ENCOUNTER — APPOINTMENT (OUTPATIENT)
Dept: INFUSION THERAPY | Facility: HOSPITAL | Age: 62
End: 2022-09-27

## 2022-09-27 ENCOUNTER — NON-APPOINTMENT (OUTPATIENT)
Age: 62
End: 2022-09-27

## 2022-09-27 LAB
BASOPHILS # BLD AUTO: 0.03 K/UL — SIGNIFICANT CHANGE UP (ref 0–0.2)
BASOPHILS NFR BLD AUTO: 0.7 % — SIGNIFICANT CHANGE UP (ref 0–2)
EOSINOPHIL # BLD AUTO: 0.02 K/UL — SIGNIFICANT CHANGE UP (ref 0–0.5)
EOSINOPHIL NFR BLD AUTO: 0.5 % — SIGNIFICANT CHANGE UP (ref 0–6)
HCT VFR BLD CALC: 32 % — LOW (ref 34.5–45)
HGB BLD-MCNC: 11 G/DL — LOW (ref 11.5–15.5)
IMM GRANULOCYTES NFR BLD AUTO: 0.2 % — SIGNIFICANT CHANGE UP (ref 0–0.9)
LYMPHOCYTES # BLD AUTO: 0.3 K/UL — LOW (ref 1–3.3)
LYMPHOCYTES # BLD AUTO: 7.3 % — LOW (ref 13–44)
MCHC RBC-ENTMCNC: 32.5 PG — SIGNIFICANT CHANGE UP (ref 27–34)
MCHC RBC-ENTMCNC: 34.4 G/DL — SIGNIFICANT CHANGE UP (ref 32–36)
MCV RBC AUTO: 94.7 FL — SIGNIFICANT CHANGE UP (ref 80–100)
MONOCYTES # BLD AUTO: 0.36 K/UL — SIGNIFICANT CHANGE UP (ref 0–0.9)
MONOCYTES NFR BLD AUTO: 8.8 % — SIGNIFICANT CHANGE UP (ref 2–14)
NEUTROPHILS # BLD AUTO: 3.38 K/UL — SIGNIFICANT CHANGE UP (ref 1.8–7.4)
NEUTROPHILS NFR BLD AUTO: 82.5 % — HIGH (ref 43–77)
NRBC # BLD: 0 /100 WBCS — SIGNIFICANT CHANGE UP (ref 0–0)
PLATELET # BLD AUTO: 122 K/UL — LOW (ref 150–400)
RBC # BLD: 3.38 M/UL — LOW (ref 3.8–5.2)
RBC # FLD: 12 % — SIGNIFICANT CHANGE UP (ref 10.3–14.5)
WBC # BLD: 4.1 K/UL — SIGNIFICANT CHANGE UP (ref 3.8–10.5)
WBC # FLD AUTO: 4.1 K/UL — SIGNIFICANT CHANGE UP (ref 3.8–10.5)

## 2022-09-27 PROCEDURE — G6002: CPT | Mod: 26

## 2022-09-28 ENCOUNTER — NON-APPOINTMENT (OUTPATIENT)
Age: 62
End: 2022-09-28

## 2022-09-28 PROCEDURE — G6002: CPT | Mod: 26

## 2022-09-28 NOTE — HISTORY OF PRESENT ILLNESS
[FreeTextEntry1] : Ms. 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 Jane, who was lost to follow up until June 2022 when imaging and biopsy confirmed vulvar SCC.\par She presents for on treatment visit today.   \par \par  \par History of Present Illness: \par She initially presented to Dr. Jane in Oct. 2020 reporting worsening vulvar burning and irritation since Feb. 2020. Endometrial biopsy was reportedly postponed secondary to pandemic. \par \par 9/11/20 - Vulva biopsy: High-grade squamous intraepithelial lesion (TATYANA 3) with warty features.\par \par 10/1/20 - Clitoris lesion biopsy: High grade squamous intraepithelial lesion (KIMI 2) with koilocytotic atypia (HPV associated changes).\par  - Pap smear: HPV mRNA HR not detected, negative for intraepithelial lesion or malignancy.\par \par 10/1/20 - Began treatment with Aldara cream per Dr. Jane.\par \par 6/23/22 - She returned to Dr. Jane, states she had been using the Aldara for a few months. She felt that it improved for a while but recently in the past few months she noticed a "lump" on the right side; she complains of constant vulvar burning/irritation, and small amount of blood seen on toilet paper after wiping the area. \par  - Vulva mass biopsy: Squamous cell carcinoma, at least in situ, invasion is not excluded. Inflammation and necrosis are identified.\par \par 6/30/22 - PET CT: FDG avidity in bilateral vulva compatible with newly diagnosed malignancy, with extension of FDG avidity into the vaginal region concerning for disease involvement. FDG avid right inguinal lymph nodes compatible with metastases. Mildly FDG avid single left inguinal lymph node concerning for additional metastatic disease; this may be further evaluated with ultrasound and possible FNA biopsy as indicated. Nonspecific mildly asymmetric FDG activity in the right base of tongue. \par \par  - MRI Pelvis: Large vulvar mass extends to the anterior lower vagina and distal urethra. Right inguinal lymphadenopathy highly suspicious for hermann involvement by carcinoma. Distended bladder.\par \par Dr. Jane therefore sent referral info to Medical Oncology and Radiation Oncology given; the extent of urethral involvement warrants chemoradiation rather than upfront surgical resection.\par \par 7/5/22 - Patient reviewed standard treatment with EBRT and weekly Cisplatin at consultation with Dr. Sanders. \par \par Clinical Course:\par 08/30/2022 - OTV fx 2/32. Today she  is feeling well. Denies any pain. No urinary or bowel complaints. No vaginal discharge/bleeding. Reports her skin is fairly raw in the treatment area at baseline, using Aquaphor daily every time she uses the bathroom. Occasionally has some external bleeding however has noted a decrease over the past few days.\par \par 09/06/2022 - OTV 6/32 fx Ms. Goodman is tolerating treatment well and denies any pain at this time.  Has occasional "burning" but is tolerable.  She states that she has a slight increase in sense of urgency/frequency of urination, denies any incontinence.  Has baseline constipation - uses Miralax or suppository if needed.  Continues to use Aquaphor on her skin.  \par \par 9/13/22: Doing well. Denies any pain. No new urinary complaints. No bowel complaints. Mild erythema noted to vulva. She feels the tumor has decreased in size. Chemo every Tuesday. Appt for medicinal marijuana scheduled Friday.  Discussed skin care, use of promise bottles, sitz baths and pain management.\par \par 9/20/22: Doing well. No pain. No urinary complaints. Moving bowels normally, can experience some constipation , using stool softener. Has nausea, using antiemetic with relief. will give rachael mints \par Small areas of vulvar desquamation\par CBC OK

## 2022-09-28 NOTE — REVIEW OF SYSTEMS
[Constipation: Grade 1 - Occasional or intermittent symptoms; occasional use of stool softeners, laxatives, dietary modification, or enema] : Constipation: Grade 1 - Occasional or intermittent symptoms; occasional use of stool softeners, laxatives, dietary modification, or enema [Diarrhea: Grade 0] : Diarrhea: Grade 0 [Nausea: Grade 0] : Nausea: Grade 0 [Vomiting: Grade 0] : Vomiting: Grade 0 [Fatigue: Grade 0] : Fatigue: Grade 0 [Hematuria: Grade 0] : Hematuria: Grade 0 [Urinary Incontinence: Grade 0] : Urinary Incontinence: Grade 0  [Urinary Retention: Grade 0] : Urinary Retention: Grade 0 [Urinary Tract Pain: Grade 0] : Urinary Tract Pain: Grade 0 [Urinary Urgency: Grade 1 - Present] : Urinary Urgency: Grade 1 - Present [Urinary Frequency: Grade 1 - Present] : Urinary Frequency: Grade 1 - Present [Pruritus: Grade 0] : Pruritus: Grade 0 [Skin Hyperpigmentation: Grade 0] : Skin Hyperpigmentation: Grade 0 [Dermatitis Radiation: Grade 1 - Faint erythema or dry desquamation] : Dermatitis Radiation: Grade 1 - Faint erythema or dry desquamation [FreeTextEntry2] : uses Miralax or suppository if needed  [FreeTextEntry9] : slight increase in frequency and urgency  [FreeTextEntry6] : using Aquaphor on skin 1-2 times daily

## 2022-09-28 NOTE — HISTORY OF PRESENT ILLNESS
[FreeTextEntry1] : Ms. 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 Jane, who was lost to follow up until June 2022 when imaging and biopsy confirmed vulvar SCC.\par She presents for on treatment visit today.   \par \par  \par History of Present Illness: \par She initially presented to Dr. Jane in Oct. 2020 reporting worsening vulvar burning and irritation since Feb. 2020. Endometrial biopsy was reportedly postponed secondary to pandemic. \par \par 9/11/20 - Vulva biopsy: High-grade squamous intraepithelial lesion (TATYANA 3) with warty features.\par \par 10/1/20 - Clitoris lesion biopsy: High grade squamous intraepithelial lesion (KIMI 2) with koilocytotic atypia (HPV associated changes).\par  - Pap smear: HPV mRNA HR not detected, negative for intraepithelial lesion or malignancy.\par \par 10/1/20 - Began treatment with Aldara cream per Dr. Jane.\par \par 6/23/22 - She returned to Dr. Jane, states she had been using the Aldara for a few months. She felt that it improved for a while but recently in the past few months she noticed a "lump" on the right side; she complains of constant vulvar burning/irritation, and small amount of blood seen on toilet paper after wiping the area. \par  - Vulva mass biopsy: Squamous cell carcinoma, at least in situ, invasion is not excluded. Inflammation and necrosis are identified.\par \par 6/30/22 - PET CT: FDG avidity in bilateral vulva compatible with newly diagnosed malignancy, with extension of FDG avidity into the vaginal region concerning for disease involvement. FDG avid right inguinal lymph nodes compatible with metastases. Mildly FDG avid single left inguinal lymph node concerning for additional metastatic disease; this may be further evaluated with ultrasound and possible FNA biopsy as indicated. Nonspecific mildly asymmetric FDG activity in the right base of tongue. \par \par  - MRI Pelvis: Large vulvar mass extends to the anterior lower vagina and distal urethra. Right inguinal lymphadenopathy highly suspicious for hermann involvement by carcinoma. Distended bladder.\par \par Dr. Jane therefore sent referral info to Medical Oncology and Radiation Oncology given; the extent of urethral involvement warrants chemoradiation rather than upfront surgical resection.\par \par 7/5/22 - Patient reviewed standard treatment with EBRT and weekly Cisplatin at consultation with Dr. Sanders. \par \par Clinical Course:\par 08/30/2022 - OTV fx 2/32. Today she  is feeling well. Denies any pain. No urinary or bowel complaints. No vaginal discharge/bleeding. Reports her skin is fairly raw in the treatment area at baseline, using Aquaphor daily every time she uses the bathroom. Occasionally has some external bleeding however has noted a decrease over the past few days.\par \par 09/06/2022 - OTV 6/32 fx Ms. Goodman is tolerating treatment well and denies any pain at this time.  Has occasional "burning" but is tolerable.  She states that she has a slight increase in sense of urgency/frequency of urination, denies any incontinence.  Has baseline constipation - uses Miralax or suppository if needed.  Continues to use Aquaphor on her skin.  \par

## 2022-09-28 NOTE — DISEASE MANAGEMENT
[Clinical] : TNM Stage: c [III] : III [FreeTextEntry4] : vulvar cancer [TTNM] : 2 [NTNM] : 1 [MTNM] : 0 [de-identified] : 400 cGy [de-identified] : 6,400 cGy [de-identified] : Pelvis/Vulva/Ing LN + Vulva/Inguinal LN CD

## 2022-09-28 NOTE — REVIEW OF SYSTEMS
[Diarrhea: Grade 0] : Diarrhea: Grade 0 [Nausea: Grade 0] : Nausea: Grade 0 [Vomiting: Grade 0] : Vomiting: Grade 0 [Fatigue: Grade 0] : Fatigue: Grade 0 [Hematuria: Grade 0] : Hematuria: Grade 0 [Urinary Incontinence: Grade 0] : Urinary Incontinence: Grade 0  [Urinary Retention: Grade 0] : Urinary Retention: Grade 0 [Urinary Tract Pain: Grade 0] : Urinary Tract Pain: Grade 0 [Pruritus: Grade 0] : Pruritus: Grade 0 [Skin Hyperpigmentation: Grade 0] : Skin Hyperpigmentation: Grade 0 [Dermatitis Radiation: Grade 0] : Dermatitis Radiation: Grade 0 [Constipation: Grade 1 - Occasional or intermittent symptoms; occasional use of stool softeners, laxatives, dietary modification, or enema] : Constipation: Grade 1 - Occasional or intermittent symptoms; occasional use of stool softeners, laxatives, dietary modification, or enema [Urinary Urgency: Grade 1 - Present] : Urinary Urgency: Grade 1 - Present [Urinary Frequency: Grade 1 - Present] : Urinary Frequency: Grade 1 - Present [FreeTextEntry2] : uses Miralax or suppository if needed  [FreeTextEntry9] : slight increase in frequency and urgency  [FreeTextEntry6] : using Aquaphor on skin 1-2 times daily

## 2022-09-28 NOTE — HISTORY OF PRESENT ILLNESS
[FreeTextEntry1] : Ms. 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 Jane, who was lost to follow up until June 2022 when imaging and biopsy confirmed vulvar SCC.\par \par  \par History of Present Illness: \par She initially presented to Dr. Jane in Oct. 2020 reporting worsening vulvar burning and irritation since Feb. 2020. Endometrial biopsy was reportedly postponed secondary to pandemic. \par \par 9/11/20 - Vulva biopsy: High-grade squamous intraepithelial lesion (TATYANA 3) with warty features.\par \par 10/1/20 - Clitoris lesion biopsy: High grade squamous intraepithelial lesion (KIMI 2) with koilocytotic atypia (HPV associated changes).\par  - Pap smear: HPV mRNA HR not detected, negative for intraepithelial lesion or malignancy.\par \par 10/1/20 - Began treatment with Aldara cream per Dr. Jane.\par \par 6/23/22 - She returned to Dr. Jane, states she had been using the Aldara for a few months. She felt that it improved for a while but recently in the past few months she noticed a "lump" on the right side; she complains of constant vulvar burning/irritation, and small amount of blood seen on toilet paper after wiping the area. \par  - Vulva mass biopsy: Squamous cell carcinoma, at least in situ, invasion is not excluded. Inflammation and necrosis are identified.\par \par 6/30/22 - PET CT: FDG avidity in bilateral vulva compatible with newly diagnosed malignancy, with extension of FDG avidity into the vaginal region concerning for disease involvement. FDG avid right inguinal lymph nodes compatible with metastases. Mildly FDG avid single left inguinal lymph node concerning for additional metastatic disease; this may be further evaluated with ultrasound and possible FNA biopsy as indicated. Nonspecific mildly asymmetric FDG activity in the right base of tongue. \par \par  - MRI Pelvis: Large vulvar mass extends to the anterior lower vagina and distal urethra. Right inguinal lymphadenopathy highly suspicious for hermann involvement by carcinoma. Distended bladder.\par \par Dr. Jane therefore sent referral info to Medical Oncology and Radiation Oncology given; the extent of urethral involvement warrants chemoradiation rather than upfront surgical resection.\par \par 7/5/22 - Patient reviewed standard treatment with EBRT and weekly Cisplatin at consultation with Dr. Sanders. \par \par Clinical Course:\par 08/30/2022 - OTV fx 2/32. Today she  is feeling well. Denies any pain. No urinary or bowel complaints. No vaginal discharge/bleeding. Reports her skin is fairly raw in the treatment area at baseline, using Aquaphor daily every time she uses the bathroom. Occasionally has some external bleeding however has noted a decrease over the past few days.\par

## 2022-09-28 NOTE — REVIEW OF SYSTEMS
[Constipation: Grade 1 - Occasional or intermittent symptoms; occasional use of stool softeners, laxatives, dietary modification, or enema] : Constipation: Grade 1 - Occasional or intermittent symptoms; occasional use of stool softeners, laxatives, dietary modification, or enema [Diarrhea: Grade 0] : Diarrhea: Grade 0 [Nausea: Grade 0] : Nausea: Grade 0 [Vomiting: Grade 0] : Vomiting: Grade 0 [Fatigue: Grade 0] : Fatigue: Grade 0 [Hematuria: Grade 0] : Hematuria: Grade 0 [Urinary Incontinence: Grade 0] : Urinary Incontinence: Grade 0  [Urinary Retention: Grade 0] : Urinary Retention: Grade 0 [Urinary Tract Pain: Grade 0] : Urinary Tract Pain: Grade 0 [Urinary Urgency: Grade 1 - Present] : Urinary Urgency: Grade 1 - Present [Urinary Frequency: Grade 1 - Present] : Urinary Frequency: Grade 1 - Present [Pruritus: Grade 0] : Pruritus: Grade 0 [Skin Hyperpigmentation: Grade 0] : Skin Hyperpigmentation: Grade 0 [FreeTextEntry2] : uses Miralax or suppository if needed  [FreeTextEntry9] : slight increase in frequency and urgency  [Dermatitis Radiation: Grade 2 - Moderate to brisk erythema; patchy moist desquamation, mostly confined to skin folds and creases; moderate edema] : Dermatitis Radiation: Grade 2 - Moderate to brisk erythema; patchy moist desquamation, mostly confined to skin folds and creases; moderate edema [FreeTextEntry6] : using Aquaphor on skin 1-2 times daily

## 2022-09-28 NOTE — DISEASE MANAGEMENT
[Clinical] : TNM Stage: c [III] : III [FreeTextEntry4] : vulvar cancer [TTNM] : 2 [NTNM] : 1 [MTNM] : 0 [de-identified] : 3000cGy [de-identified] : 6,400 cGy [de-identified] : Pelvis/Vulva/Ing LN + Vulva/Inguinal LN CD

## 2022-09-28 NOTE — DISEASE MANAGEMENT
[Clinical] : TNM Stage: c [III] : III [FreeTextEntry4] : vulvar cancer [TTNM] : 2 [NTNM] : 1 [MTNM] : 0 [de-identified] : 1,200 cGy [de-identified] : 6,400 cGy [de-identified] : Pelvis/Vulva/Ing LN + Vulva/Inguinal LN CD

## 2022-09-28 NOTE — DISEASE MANAGEMENT
[Clinical] : TNM Stage: c [III] : III [FreeTextEntry4] : vulvar cancer [TTNM] : 2 [NTNM] : 1 [MTNM] : 0 [de-identified] : 2000 cGy [de-identified] : 6,400 cGy [de-identified] : Pelvis/Vulva/Ing LN + Vulva/Inguinal LN CD

## 2022-09-29 PROCEDURE — G6002: CPT | Mod: 26

## 2022-09-30 PROCEDURE — G6002: CPT | Mod: 26

## 2022-09-30 NOTE — HISTORY OF PRESENT ILLNESS
[FreeTextEntry1] : Ms. 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 Jane, who was lost to follow up until June 2022 when imaging and biopsy confirmed vulvar SCC.\par She presents for on treatment visit today.   \par \par  \par History of Present Illness: \par She initially presented to Dr. Jane in Oct. 2020 reporting worsening vulvar burning and irritation since Feb. 2020. Endometrial biopsy was reportedly postponed secondary to pandemic. \par \par 9/11/20 - Vulva biopsy: High-grade squamous intraepithelial lesion (TATYANA 3) with warty features.\par \par 10/1/20 - Clitoris lesion biopsy: High grade squamous intraepithelial lesion (KIMI 2) with koilocytotic atypia (HPV associated changes).\par  - Pap smear: HPV mRNA HR not detected, negative for intraepithelial lesion or malignancy.\par \par 10/1/20 - Began treatment with Aldara cream per Dr. Jane.\par \par 6/23/22 - She returned to Dr. Jane, states she had been using the Aldara for a few months. She felt that it improved for a while but recently in the past few months she noticed a "lump" on the right side; she complains of constant vulvar burning/irritation, and small amount of blood seen on toilet paper after wiping the area. \par  - Vulva mass biopsy: Squamous cell carcinoma, at least in situ, invasion is not excluded. Inflammation and necrosis are identified.\par \par 6/30/22 - PET CT: FDG avidity in bilateral vulva compatible with newly diagnosed malignancy, with extension of FDG avidity into the vaginal region concerning for disease involvement. FDG avid right inguinal lymph nodes compatible with metastases. Mildly FDG avid single left inguinal lymph node concerning for additional metastatic disease; this may be further evaluated with ultrasound and possible FNA biopsy as indicated. Nonspecific mildly asymmetric FDG activity in the right base of tongue. \par \par  - MRI Pelvis: Large vulvar mass extends to the anterior lower vagina and distal urethra. Right inguinal lymphadenopathy highly suspicious for hermann involvement by carcinoma. Distended bladder.\par \par Dr. Jane therefore sent referral info to Medical Oncology and Radiation Oncology given; the extent of urethral involvement warrants chemoradiation rather than upfront surgical resection.\par \par 7/5/22 - Patient reviewed standard treatment with EBRT and weekly Cisplatin at consultation with Dr. Sanders. \par \par Clinical Course:\par 08/30/2022 - OTV fx 2/32. Today she  is feeling well. Denies any pain. No urinary or bowel complaints. No vaginal discharge/bleeding. Reports her skin is fairly raw in the treatment area at baseline, using Aquaphor daily every time she uses the bathroom. Occasionally has some external bleeding however has noted a decrease over the past few days.\par \par 09/06/2022 - OTV 6/32 fx Ms. Goodman is tolerating treatment well and denies any pain at this time.  Has occasional "burning" but is tolerable.  She states that she has a slight increase in sense of urgency/frequency of urination, denies any incontinence.  Has baseline constipation - uses Miralax or suppository if needed.  Continues to use Aquaphor on her skin.  \par \par 9/13/22: Doing well. Denies any pain. No new urinary complaints. No bowel complaints. Mild erythema noted to vulva. She feels the tumor has decreased in size. Chemo every Tuesday. Appt for medicinal marijuana scheduled Friday.  Discussed skin care, use of promise bottles, sitz baths and pain management.\par \par 9/20/22: Doing well. No pain. No urinary complaints. Moving bowels normally, can experience some constipation , using stool softener. Has nausea, using antiemetic with relief. will give rachael mints \par \par 9/27/22: Feeling well. Pain has increased to 4/10, not taking any analgesia for this. Discussed pain management and verbalized understanding. No urinary complaints. Had one episode of diarrhea which subsided. Discussed use of Imodium. Continues with nausea, taking compazine with relief. Mod erythema noted to b/l groin and vulva. Small blister b/l groin. Skin care discussed, using Aquaphor.

## 2022-09-30 NOTE — VITALS
[90: Able to carry normal activity; minor signs or symptoms of disease.] : 90: Able to carry normal activity; minor signs or symptoms of disease.  [ECOG Performance Status: 1 - Restricted in physically strenuous activity but ambulatory and able to carry out work of a light or sedentary nature] : Performance Status: 1 - Restricted in physically strenuous activity but ambulatory and able to carry out work of a light or sedentary nature, e.g., light house work, office work [Maximal Pain Intensity: 5/10] : 5/10 [Least Pain Intensity: 4/10] : 4/10 [Pain Description/Quality: ___] : Pain description/quality: [unfilled] [Pain Duration: ___] : Pain duration: [unfilled] [Pain Location: ___] : Pain Location: [unfilled] [OTC] : OTC [Pain Interferes with ADLs] : Pain does not interfere with activities of daily living

## 2022-09-30 NOTE — DISEASE MANAGEMENT
[Clinical] : TNM Stage: c [III] : III [FreeTextEntry4] : vulvar cancer [TTNM] : 2 [NTNM] : 1 [MTNM] : 0 [de-identified] : 4000cGy [de-identified] : 6,400 cGy [de-identified] : Pelvis/Vulva/Ing LN + Vulva/Inguinal LN CD

## 2022-10-03 ENCOUNTER — APPOINTMENT (OUTPATIENT)
Dept: HEMATOLOGY ONCOLOGY | Facility: CLINIC | Age: 62
End: 2022-10-03

## 2022-10-03 PROCEDURE — 77427 RADIATION TX MANAGEMENT X5: CPT

## 2022-10-03 PROCEDURE — 77014: CPT | Mod: 26

## 2022-10-04 ENCOUNTER — NON-APPOINTMENT (OUTPATIENT)
Age: 62
End: 2022-10-04

## 2022-10-04 ENCOUNTER — APPOINTMENT (OUTPATIENT)
Dept: INFUSION THERAPY | Facility: HOSPITAL | Age: 62
End: 2022-10-04

## 2022-10-04 ENCOUNTER — RESULT REVIEW (OUTPATIENT)
Age: 62
End: 2022-10-04

## 2022-10-04 ENCOUNTER — APPOINTMENT (OUTPATIENT)
Dept: HEMATOLOGY ONCOLOGY | Facility: CLINIC | Age: 62
End: 2022-10-04

## 2022-10-04 VITALS
DIASTOLIC BLOOD PRESSURE: 84 MMHG | HEIGHT: 61 IN | RESPIRATION RATE: 17 BRPM | WEIGHT: 135.8 LBS | SYSTOLIC BLOOD PRESSURE: 139 MMHG | BODY MASS INDEX: 25.64 KG/M2 | TEMPERATURE: 97.16 F | HEART RATE: 58 BPM | OXYGEN SATURATION: 100 %

## 2022-10-04 VITALS
BODY MASS INDEX: 24.54 KG/M2 | HEIGHT: 61.97 IN | WEIGHT: 133.38 LBS | SYSTOLIC BLOOD PRESSURE: 171 MMHG | RESPIRATION RATE: 16 BRPM | HEART RATE: 90 BPM | DIASTOLIC BLOOD PRESSURE: 82 MMHG | TEMPERATURE: 206.6 F | OXYGEN SATURATION: 97 %

## 2022-10-04 LAB
BASOPHILS # BLD AUTO: 0.02 K/UL — SIGNIFICANT CHANGE UP (ref 0–0.2)
BASOPHILS NFR BLD AUTO: 0.7 % — SIGNIFICANT CHANGE UP (ref 0–2)
EOSINOPHIL # BLD AUTO: 0.14 K/UL — SIGNIFICANT CHANGE UP (ref 0–0.5)
EOSINOPHIL NFR BLD AUTO: 4.8 % — SIGNIFICANT CHANGE UP (ref 0–6)
HCT VFR BLD CALC: 31.3 % — LOW (ref 34.5–45)
HGB BLD-MCNC: 10.9 G/DL — LOW (ref 11.5–15.5)
IMM GRANULOCYTES NFR BLD AUTO: 2 % — HIGH (ref 0–0.9)
LYMPHOCYTES # BLD AUTO: 0.24 K/UL — LOW (ref 1–3.3)
LYMPHOCYTES # BLD AUTO: 8.2 % — LOW (ref 13–44)
MCHC RBC-ENTMCNC: 32.7 PG — SIGNIFICANT CHANGE UP (ref 27–34)
MCHC RBC-ENTMCNC: 34.8 G/DL — SIGNIFICANT CHANGE UP (ref 32–36)
MCV RBC AUTO: 94 FL — SIGNIFICANT CHANGE UP (ref 80–100)
MONOCYTES # BLD AUTO: 0.23 K/UL — SIGNIFICANT CHANGE UP (ref 0–0.9)
MONOCYTES NFR BLD AUTO: 7.8 % — SIGNIFICANT CHANGE UP (ref 2–14)
NEUTROPHILS # BLD AUTO: 2.25 K/UL — SIGNIFICANT CHANGE UP (ref 1.8–7.4)
NEUTROPHILS NFR BLD AUTO: 76.5 % — SIGNIFICANT CHANGE UP (ref 43–77)
NRBC # BLD: 0 /100 WBCS — SIGNIFICANT CHANGE UP (ref 0–0)
PLATELET # BLD AUTO: 83 K/UL — LOW (ref 150–400)
RBC # BLD: 3.33 M/UL — LOW (ref 3.8–5.2)
RBC # FLD: 12.3 % — SIGNIFICANT CHANGE UP (ref 10.3–14.5)
WBC # BLD: 2.94 K/UL — LOW (ref 3.8–10.5)
WBC # FLD AUTO: 2.94 K/UL — LOW (ref 3.8–10.5)

## 2022-10-04 PROCEDURE — 99214 OFFICE O/P EST MOD 30 MIN: CPT

## 2022-10-04 PROCEDURE — G6002: CPT | Mod: 26

## 2022-10-04 NOTE — HISTORY OF PRESENT ILLNESS
[FreeTextEntry1] : Ms. 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 Jane, who was lost to follow up until June 2022 when imaging and biopsy confirmed vulvar SCC.\par She presents for on treatment visit today.   \par \par  \par History of Present Illness: \par She initially presented to Dr. Jane in Oct. 2020 reporting worsening vulvar burning and irritation since Feb. 2020. Endometrial biopsy was reportedly postponed secondary to pandemic. \par \par 9/11/20 - Vulva biopsy: High-grade squamous intraepithelial lesion (TATYANA 3) with warty features.\par \par 10/1/20 - Clitoris lesion biopsy: High grade squamous intraepithelial lesion (KIMI 2) with koilocytotic atypia (HPV associated changes).\par  - Pap smear: HPV mRNA HR not detected, negative for intraepithelial lesion or malignancy.\par \par 10/1/20 - Began treatment with Aldara cream per Dr. Jane.\par \par 6/23/22 - She returned to Dr. Jane, states she had been using the Aldara for a few months. She felt that it improved for a while but recently in the past few months she noticed a "lump" on the right side; she complains of constant vulvar burning/irritation, and small amount of blood seen on toilet paper after wiping the area. \par  - Vulva mass biopsy: Squamous cell carcinoma, at least in situ, invasion is not excluded. Inflammation and necrosis are identified.\par \par 6/30/22 - PET CT: FDG avidity in bilateral vulva compatible with newly diagnosed malignancy, with extension of FDG avidity into the vaginal region concerning for disease involvement. FDG avid right inguinal lymph nodes compatible with metastases. Mildly FDG avid single left inguinal lymph node concerning for additional metastatic disease; this may be further evaluated with ultrasound and possible FNA biopsy as indicated. Nonspecific mildly asymmetric FDG activity in the right base of tongue. \par \par  - MRI Pelvis: Large vulvar mass extends to the anterior lower vagina and distal urethra. Right inguinal lymphadenopathy highly suspicious for hermann involvement by carcinoma. Distended bladder.\par \par Dr. Jane therefore sent referral info to Medical Oncology and Radiation Oncology given; the extent of urethral involvement warrants chemoradiation rather than upfront surgical resection.\par \par 7/5/22 - Patient reviewed standard treatment with EBRT and weekly Cisplatin at consultation with Dr. Sanders. \par \par Clinical Course:\par 08/30/2022 - OTV fx 2/32. Today she  is feeling well. Denies any pain. No urinary or bowel complaints. No vaginal discharge/bleeding. Reports her skin is fairly raw in the treatment area at baseline, using Aquaphor daily every time she uses the bathroom. Occasionally has some external bleeding however has noted a decrease over the past few days.\par \par 09/06/2022 - OTV 6/32 fx Ms. Goodman is tolerating treatment well and denies any pain at this time.  Has occasional "burning" but is tolerable.  She states that she has a slight increase in sense of urgency/frequency of urination, denies any incontinence.  Has baseline constipation - uses Miralax or suppository if needed.  Continues to use Aquaphor on her skin.  \par \par 9/13/22: Doing well. Denies any pain. No new urinary complaints. No bowel complaints. Mild erythema noted to vulva. She feels the tumor has decreased in size. Chemo every Tuesday. Appt for medicinal marijuana scheduled Friday.  Discussed skin care, use of promise bottles, sitz baths and pain management.\par \par 9/20/22: Doing well. No pain. No urinary complaints. Moving bowels normally, can experience some constipation , using stool softener. Has nausea, using antiemetic with relief. will give rachael mints \par \par 9/27/22: Feeling well. Pain has increased to 4/10, not taking any analgesia for this. Discussed pain management and verbalized understanding. No urinary complaints. Had one episode of diarrhea which subsided. Discussed use of Imodium. Continues with nausea, taking compazine with relief. Mod erythema noted to b/l groin and vulva. Small blister b/l groin. Skin care discussed, using Aquaphor. \par \par 10/4/22: Chemo held today due (plt 83). Pain has increased. Will send script to pharmacy on file. Reports increase in skin reaction, using Aquaphor due to sulfa allergy.  Discussed use of A&D, desitin

## 2022-10-04 NOTE — DISEASE MANAGEMENT
[Clinical] : TNM Stage: c [III] : III [FreeTextEntry4] : vulvar cancer [TTNM] : 2 [NTNM] : 1 [MTNM] : 0 [de-identified] : 5000cGy [de-identified] : 6,400 cGy [de-identified] : Pelvis/Vulva/Ing LN + Vulva/Inguinal LN CD

## 2022-10-04 NOTE — VITALS
[Maximal Pain Intensity: 5/10] : 5/10 [Pain Description/Quality: ___] : Pain description/quality: [unfilled] [Pain Duration: ___] : Pain duration: [unfilled] [Pain Location: ___] : Pain Location: [unfilled] [OTC] : OTC [90: Able to carry normal activity; minor signs or symptoms of disease.] : 90: Able to carry normal activity; minor signs or symptoms of disease.  [ECOG Performance Status: 1 - Restricted in physically strenuous activity but ambulatory and able to carry out work of a light or sedentary nature] : Performance Status: 1 - Restricted in physically strenuous activity but ambulatory and able to carry out work of a light or sedentary nature, e.g., light house work, office work [Least Pain Intensity: 5/10] : 5/10 [Pain Interferes with ADLs] : Pain does not interfere with activities of daily living

## 2022-10-05 ENCOUNTER — NON-APPOINTMENT (OUTPATIENT)
Age: 62
End: 2022-10-05

## 2022-10-05 PROCEDURE — G6002: CPT | Mod: 26

## 2022-10-05 NOTE — PHYSICAL EXAM
[Fully active, able to carry on all pre-disease performance without restriction] : Status 0 - Fully active, able to carry on all pre-disease performance without restriction [Normal] : affect appropriate [de-identified] : Extensive vulvar rash with desquamation

## 2022-10-05 NOTE — REVIEW OF SYSTEMS
[Fatigue] : fatigue [Skin Rash] : skin rash [Negative] : Allergic/Immunologic [Fever] : no fever [Dysphagia] : no dysphagia [Mucosal Pain] : no mucosal pain [Chest Pain] : no chest pain [Lower Ext Edema] : no lower extremity edema [Shortness Of Breath] : no shortness of breath [Cough] : no cough [Abdominal Pain] : no abdominal pain [Diarrhea] : no diarrhea [Dysuria] : no dysuria [Vaginal Discharge] : no vaginal discharge [Joint Pain] : no joint pain [Muscle Pain] : no muscle pain [Dizziness] : no dizziness [Difficulty Walking] : no difficulty walking [Anxiety] : no anxiety [Depression] : no depression [Hot Flashes] : no hot flashes [Easy Bleeding] : no tendency for easy bleeding

## 2022-10-05 NOTE — HISTORY OF PRESENT ILLNESS
[Disease: _____________________] : Disease: [unfilled] [AJCC Stage: ____] : AJCC Stage: [unfilled] [de-identified] : 61 year old woman with hx of HTN, celiac disease presenting for initial consultation of vulvar mass.\par \par Pt states she was diagnosed with TATYANA 3 10/2020 by Dr. Jane and was started on Aldara. She returned to her clinic 3 months ago as patient discovered a vaginal cyst. She had a biopsy performed 6/23/22.\par \par MR Pelvis 6/30/22: \par IMPRESSION:\par Large vulvar mass 5.5 x 4.8 x 2.5 cm  extends to the anterior lower vagina and distal urethra.\par Right inguinal lymphadenopathy  1.8 x 1.3 cm highly suspicious for hermann involvement by carcinoma\par \par PET/CT 6/2022\par IMPRESSION: Abnormal FDG-PET/CT scan.\par 1. FDG avidity in bilateral vulva compatible with newly diagnosed malignancy, with extension of FDG avidity into the vaginal region concerning for disease involvement.\par 2. FDG avid right inguinal lymph nodes compatible with metastases. Mildly FDG avid single left inguinal lymph node concerning for additional metastatic disease; this may be further evaluated with ultrasound and possible FNA biopsy as indicated.\par Feeling well. Endorses slight fatigue, felt it may be related to work. Appetite well but endorses weight loss,  States had painful urination a over a year but has improved. Denies fevers, SOB, CP, AP, hematuria, \par slight bleeding with wiping and associated pain. \par \par LMP 2008  [de-identified] : Squamous Cell carcinoma, vulvar [FreeTextEntry1] : RT and weekly Cisplatin [de-identified] : Patient is here for follow up and Cisplatin. However, her platelets are low at 83, so treatment will be held. She still notes nausea. \par She still notes an extensive rash in the lower pelvic/vulvar region.\par She has an appt next week with Dr. Infante to discuss medical cannabis.\par Otherwise, tolerating treatment. Appetite is ok so far. Denies nausea, vomiting,\par She denies fever, chills, abdominal pain, swelling.

## 2022-10-05 NOTE — ASSESSMENT
[FreeTextEntry1] : 61 year old woman with vulvar cancer receiving treatment with RT and weekly Cisplatin, begun 8/30/22\par Appt on 10/3 with palliative care for medical cannabis to help with symptom management.\par Tylenol as needed for pain.\par Continue laxatives as needed for constipation.  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 If platelets < 100 next week, will hold tx then\par However, will likely continue with RT\par Check labs today - CBC, CMP, Mg.\par RTC 2 weeks.\par \par \par Patient seen with and plan discussed with Dr. Sanders.\par \par Aryles Hedjar, MD, PGY-5\par Hematology/Oncology Fellow\par [facility] [Curative] : Goals of care discussed with patient: Curative [Palliative Care Plan] : not applicable at this time

## 2022-10-06 ENCOUNTER — RESULT REVIEW (OUTPATIENT)
Age: 62
End: 2022-10-06

## 2022-10-06 ENCOUNTER — APPOINTMENT (OUTPATIENT)
Dept: HEMATOLOGY ONCOLOGY | Facility: CLINIC | Age: 62
End: 2022-10-06

## 2022-10-06 LAB
BASOPHILS # BLD AUTO: 0.01 K/UL — SIGNIFICANT CHANGE UP (ref 0–0.2)
BASOPHILS NFR BLD AUTO: 0.4 % — SIGNIFICANT CHANGE UP (ref 0–2)
EOSINOPHIL # BLD AUTO: 0.03 K/UL — SIGNIFICANT CHANGE UP (ref 0–0.5)
EOSINOPHIL NFR BLD AUTO: 1.3 % — SIGNIFICANT CHANGE UP (ref 0–6)
HCT VFR BLD CALC: 26.3 % — LOW (ref 34.5–45)
HGB BLD-MCNC: 9.1 G/DL — LOW (ref 11.5–15.5)
IMM GRANULOCYTES NFR BLD AUTO: 0.4 % — SIGNIFICANT CHANGE UP (ref 0–0.9)
LYMPHOCYTES # BLD AUTO: 0.3 K/UL — LOW (ref 1–3.3)
LYMPHOCYTES # BLD AUTO: 13.2 % — SIGNIFICANT CHANGE UP (ref 13–44)
MCHC RBC-ENTMCNC: 33.3 PG — SIGNIFICANT CHANGE UP (ref 27–34)
MCHC RBC-ENTMCNC: 34.6 G/DL — SIGNIFICANT CHANGE UP (ref 32–36)
MCV RBC AUTO: 96.3 FL — SIGNIFICANT CHANGE UP (ref 80–100)
MONOCYTES # BLD AUTO: 0.28 K/UL — SIGNIFICANT CHANGE UP (ref 0–0.9)
MONOCYTES NFR BLD AUTO: 12.3 % — SIGNIFICANT CHANGE UP (ref 2–14)
NEUTROPHILS # BLD AUTO: 1.65 K/UL — LOW (ref 1.8–7.4)
NEUTROPHILS NFR BLD AUTO: 72.4 % — SIGNIFICANT CHANGE UP (ref 43–77)
NRBC # BLD: 0 /100 WBCS — SIGNIFICANT CHANGE UP (ref 0–0)
PLATELET # BLD AUTO: 76 K/UL — LOW (ref 150–400)
RBC # BLD: 2.73 M/UL — LOW (ref 3.8–5.2)
RBC # FLD: 12.7 % — SIGNIFICANT CHANGE UP (ref 10.3–14.5)
WBC # BLD: 2.28 K/UL — LOW (ref 3.8–10.5)
WBC # FLD AUTO: 2.28 K/UL — LOW (ref 3.8–10.5)

## 2022-10-06 PROCEDURE — G6002: CPT | Mod: 26

## 2022-10-11 ENCOUNTER — APPOINTMENT (OUTPATIENT)
Dept: INFUSION THERAPY | Facility: HOSPITAL | Age: 62
End: 2022-10-11

## 2022-10-11 ENCOUNTER — RESULT REVIEW (OUTPATIENT)
Age: 62
End: 2022-10-11

## 2022-10-11 ENCOUNTER — APPOINTMENT (OUTPATIENT)
Dept: HEMATOLOGY ONCOLOGY | Facility: CLINIC | Age: 62
End: 2022-10-11

## 2022-10-11 LAB
ALBUMIN SERPL ELPH-MCNC: 3.8 G/DL — SIGNIFICANT CHANGE UP (ref 3.3–5)
ALP SERPL-CCNC: 60 U/L — SIGNIFICANT CHANGE UP (ref 40–120)
ALT FLD-CCNC: 8 U/L — LOW (ref 10–45)
ANION GAP SERPL CALC-SCNC: 10 MMOL/L — SIGNIFICANT CHANGE UP (ref 5–17)
AST SERPL-CCNC: 12 U/L — SIGNIFICANT CHANGE UP (ref 10–40)
BASOPHILS # BLD AUTO: 0 K/UL — SIGNIFICANT CHANGE UP (ref 0–0.2)
BASOPHILS NFR BLD AUTO: 0 % — SIGNIFICANT CHANGE UP (ref 0–2)
BILIRUB SERPL-MCNC: <0.2 MG/DL — SIGNIFICANT CHANGE UP (ref 0.2–1.2)
BUN SERPL-MCNC: 18 MG/DL — SIGNIFICANT CHANGE UP (ref 7–23)
CALCIUM SERPL-MCNC: 9 MG/DL — SIGNIFICANT CHANGE UP (ref 8.4–10.5)
CHLORIDE SERPL-SCNC: 103 MMOL/L — SIGNIFICANT CHANGE UP (ref 96–108)
CO2 SERPL-SCNC: 27 MMOL/L — SIGNIFICANT CHANGE UP (ref 22–31)
CREAT SERPL-MCNC: 0.89 MG/DL — SIGNIFICANT CHANGE UP (ref 0.5–1.3)
EGFR: 74 ML/MIN/1.73M2 — SIGNIFICANT CHANGE UP
EOSINOPHIL # BLD AUTO: 0.02 K/UL — SIGNIFICANT CHANGE UP (ref 0–0.5)
EOSINOPHIL NFR BLD AUTO: 1.5 % — SIGNIFICANT CHANGE UP (ref 0–6)
GLUCOSE SERPL-MCNC: 101 MG/DL — HIGH (ref 70–99)
HCT VFR BLD CALC: 27.4 % — LOW (ref 34.5–45)
HGB BLD-MCNC: 9.5 G/DL — LOW (ref 11.5–15.5)
IMM GRANULOCYTES NFR BLD AUTO: 0 % — SIGNIFICANT CHANGE UP (ref 0–0.9)
LYMPHOCYTES # BLD AUTO: 0.26 K/UL — LOW (ref 1–3.3)
LYMPHOCYTES # BLD AUTO: 20 % — SIGNIFICANT CHANGE UP (ref 13–44)
MAGNESIUM SERPL-MCNC: 1.8 MG/DL — SIGNIFICANT CHANGE UP (ref 1.6–2.6)
MCHC RBC-ENTMCNC: 33.2 PG — SIGNIFICANT CHANGE UP (ref 27–34)
MCHC RBC-ENTMCNC: 34.7 G/DL — SIGNIFICANT CHANGE UP (ref 32–36)
MCV RBC AUTO: 95.8 FL — SIGNIFICANT CHANGE UP (ref 80–100)
MONOCYTES # BLD AUTO: 0.26 K/UL — SIGNIFICANT CHANGE UP (ref 0–0.9)
MONOCYTES NFR BLD AUTO: 20 % — HIGH (ref 2–14)
NEUTROPHILS # BLD AUTO: 0.76 K/UL — LOW (ref 1.8–7.4)
NEUTROPHILS NFR BLD AUTO: 58.5 % — SIGNIFICANT CHANGE UP (ref 43–77)
NRBC # BLD: 0 /100 WBCS — SIGNIFICANT CHANGE UP (ref 0–0)
PLATELET # BLD AUTO: 104 K/UL — LOW (ref 150–400)
POTASSIUM SERPL-MCNC: 4.4 MMOL/L — SIGNIFICANT CHANGE UP (ref 3.5–5.3)
POTASSIUM SERPL-SCNC: 4.4 MMOL/L — SIGNIFICANT CHANGE UP (ref 3.5–5.3)
PROT SERPL-MCNC: 6.4 G/DL — SIGNIFICANT CHANGE UP (ref 6–8.3)
RBC # BLD: 2.86 M/UL — LOW (ref 3.8–5.2)
RBC # FLD: 13.2 % — SIGNIFICANT CHANGE UP (ref 10.3–14.5)
SODIUM SERPL-SCNC: 140 MMOL/L — SIGNIFICANT CHANGE UP (ref 135–145)
WBC # BLD: 1.3 K/UL — LOW (ref 3.8–10.5)
WBC # FLD AUTO: 1.3 K/UL — LOW (ref 3.8–10.5)

## 2022-10-11 PROCEDURE — 99214 OFFICE O/P EST MOD 30 MIN: CPT

## 2022-10-12 ENCOUNTER — RESULT REVIEW (OUTPATIENT)
Age: 62
End: 2022-10-12

## 2022-10-12 ENCOUNTER — APPOINTMENT (OUTPATIENT)
Dept: HEMATOLOGY ONCOLOGY | Facility: CLINIC | Age: 62
End: 2022-10-12

## 2022-10-12 LAB
BASOPHILS # BLD AUTO: 0.01 K/UL — SIGNIFICANT CHANGE UP (ref 0–0.2)
BASOPHILS NFR BLD AUTO: 0.8 % — SIGNIFICANT CHANGE UP (ref 0–2)
EOSINOPHIL # BLD AUTO: 0.03 K/UL — SIGNIFICANT CHANGE UP (ref 0–0.5)
EOSINOPHIL NFR BLD AUTO: 2.3 % — SIGNIFICANT CHANGE UP (ref 0–6)
HCT VFR BLD CALC: 25.8 % — LOW (ref 34.5–45)
HGB BLD-MCNC: 8.9 G/DL — LOW (ref 11.5–15.5)
IMM GRANULOCYTES NFR BLD AUTO: 0.8 % — SIGNIFICANT CHANGE UP (ref 0–0.9)
LYMPHOCYTES # BLD AUTO: 0.32 K/UL — LOW (ref 1–3.3)
LYMPHOCYTES # BLD AUTO: 24.4 % — SIGNIFICANT CHANGE UP (ref 13–44)
MCHC RBC-ENTMCNC: 33.5 PG — SIGNIFICANT CHANGE UP (ref 27–34)
MCHC RBC-ENTMCNC: 34.5 G/DL — SIGNIFICANT CHANGE UP (ref 32–36)
MCV RBC AUTO: 97 FL — SIGNIFICANT CHANGE UP (ref 80–100)
MONOCYTES # BLD AUTO: 0.34 K/UL — SIGNIFICANT CHANGE UP (ref 0–0.9)
MONOCYTES NFR BLD AUTO: 26 % — HIGH (ref 2–14)
NEUTROPHILS # BLD AUTO: 0.6 K/UL — LOW (ref 1.8–7.4)
NEUTROPHILS NFR BLD AUTO: 45.7 % — SIGNIFICANT CHANGE UP (ref 43–77)
NRBC # BLD: 0 /100 WBCS — SIGNIFICANT CHANGE UP (ref 0–0)
PLATELET # BLD AUTO: 118 K/UL — LOW (ref 150–400)
RBC # BLD: 2.66 M/UL — LOW (ref 3.8–5.2)
RBC # FLD: 13.9 % — SIGNIFICANT CHANGE UP (ref 10.3–14.5)
WBC # BLD: 1.31 K/UL — LOW (ref 3.8–10.5)
WBC # FLD AUTO: 1.31 K/UL — LOW (ref 3.8–10.5)

## 2022-10-12 NOTE — ASSESSMENT
[Curative] : Goals of care discussed with patient: Curative [Palliative Care Plan] : not applicable at this time [FreeTextEntry1] : 61 year old woman with vulvar cancer receiving treatment with RT and weekly Cisplatin, begun 8/30/22.\par Will hold treatment today due to low ANC.\par Infection precautions discussed, will recheck cbc tomorrow.\par Discussed with rad/onc will also hold RT tomorrow.\par Nausea - will give Zofran.\par IV fluids given today and labs checked - CMP, Mg.\par RTC 2-3 weeks.\par \par

## 2022-10-12 NOTE — HISTORY OF PRESENT ILLNESS
[Disease: _____________________] : Disease: [unfilled] [AJCC Stage: ____] : AJCC Stage: [unfilled] [de-identified] : 61 year old woman with hx of HTN, celiac disease presenting for initial consultation of vulvar mass.\par \par Pt states she was diagnosed with TATYANA 3 10/2020 by Dr. Jane and was started on Aldara. She returned to her clinic 3 months ago as patient discovered a vaginal cyst. She had a biopsy performed 6/23/22.\par \par MR Pelvis 6/30/22: \par IMPRESSION:\par Large vulvar mass 5.5 x 4.8 x 2.5 cm  extends to the anterior lower vagina and distal urethra.\par Right inguinal lymphadenopathy  1.8 x 1.3 cm highly suspicious for hermann involvement by carcinoma\par \par PET/CT 6/2022\par IMPRESSION: Abnormal FDG-PET/CT scan.\par 1. FDG avidity in bilateral vulva compatible with newly diagnosed malignancy, with extension of FDG avidity into the vaginal region concerning for disease involvement.\par 2. FDG avid right inguinal lymph nodes compatible with metastases. Mildly FDG avid single left inguinal lymph node concerning for additional metastatic disease; this may be further evaluated with ultrasound and possible FNA biopsy as indicated.\par Feeling well. Endorses slight fatigue, felt it may be related to work. Appetite well but endorses weight loss,  States had painful urination a over a year but has improved. Denies fevers, SOB, CP, AP, hematuria, \par slight bleeding with wiping and associated pain. \par \par LMP 2008  [de-identified] : Squamous Cell carcinoma, vulvar [FreeTextEntry1] : RT and weekly Cisplatin [de-identified] : Patient is here for follow up and treatment.\par ANC is low today so will hold chemo and RT.\par She continues to have fatigue.\par Appetite is decreased with some weight loss but forcing her self to eat, mostly protein.  Her  cooks for her.\par Constipation has been better over the past week since chemo was held.\par She continues to have nausea, slight relief from Compazine, no vomiting.\par She has some intermittent hemorrhoidal bleeding and irritation - stable.  She had a few days of vaginal discharge last week which resolved.\par

## 2022-10-12 NOTE — REVIEW OF SYSTEMS
[Fatigue] : fatigue [Skin Rash] : skin rash [Negative] : Allergic/Immunologic [Fever] : no fever [Dysphagia] : no dysphagia [Mucosal Pain] : no mucosal pain [Chest Pain] : no chest pain [Lower Ext Edema] : no lower extremity edema [Shortness Of Breath] : no shortness of breath [Cough] : no cough [Abdominal Pain] : no abdominal pain [Diarrhea] : no diarrhea [Dysuria] : no dysuria [Vaginal Discharge] : no vaginal discharge [Joint Pain] : no joint pain [Muscle Pain] : no muscle pain [Dizziness] : no dizziness [Difficulty Walking] : no difficulty walking [Anxiety] : no anxiety [Depression] : no depression [Hot Flashes] : no hot flashes [Easy Bleeding] : no tendency for easy bleeding [FreeTextEntry7] : nausea

## 2022-10-12 NOTE — PHYSICAL EXAM
[Fully active, able to carry on all pre-disease performance without restriction] : Status 0 - Fully active, able to carry on all pre-disease performance without restriction [Normal] : affect appropriate [de-identified] : Extensive vulvar rash with desquamation

## 2022-10-13 ENCOUNTER — RESULT REVIEW (OUTPATIENT)
Age: 62
End: 2022-10-13

## 2022-10-13 ENCOUNTER — APPOINTMENT (OUTPATIENT)
Dept: HEMATOLOGY ONCOLOGY | Facility: CLINIC | Age: 62
End: 2022-10-13

## 2022-10-13 LAB
BASOPHILS # BLD AUTO: 0.01 K/UL — SIGNIFICANT CHANGE UP (ref 0–0.2)
BASOPHILS NFR BLD AUTO: 1 % — SIGNIFICANT CHANGE UP (ref 0–2)
EOSINOPHIL # BLD AUTO: 0.03 K/UL — SIGNIFICANT CHANGE UP (ref 0–0.5)
EOSINOPHIL NFR BLD AUTO: 2 % — SIGNIFICANT CHANGE UP (ref 0–6)
HCT VFR BLD CALC: 25.7 % — LOW (ref 34.5–45)
HGB BLD-MCNC: 8.9 G/DL — LOW (ref 11.5–15.5)
LYMPHOCYTES # BLD AUTO: 0.38 K/UL — LOW (ref 1–3.3)
LYMPHOCYTES # BLD AUTO: 26 % — SIGNIFICANT CHANGE UP (ref 13–44)
MCHC RBC-ENTMCNC: 33.6 PG — SIGNIFICANT CHANGE UP (ref 27–34)
MCHC RBC-ENTMCNC: 34.6 G/DL — SIGNIFICANT CHANGE UP (ref 32–36)
MCV RBC AUTO: 97 FL — SIGNIFICANT CHANGE UP (ref 80–100)
MONOCYTES # BLD AUTO: 0.28 K/UL — SIGNIFICANT CHANGE UP (ref 0–0.9)
MONOCYTES NFR BLD AUTO: 19 % — HIGH (ref 2–14)
NEUTROPHILS # BLD AUTO: 0.76 K/UL — LOW (ref 1.8–7.4)
NEUTROPHILS NFR BLD AUTO: 52 % — SIGNIFICANT CHANGE UP (ref 43–77)
NRBC # BLD: 0 /100 — SIGNIFICANT CHANGE UP (ref 0–0)
NRBC # BLD: SIGNIFICANT CHANGE UP /100 WBCS (ref 0–0)
PLAT MORPH BLD: NORMAL — SIGNIFICANT CHANGE UP
PLATELET # BLD AUTO: 138 K/UL — LOW (ref 150–400)
RBC # BLD: 2.65 M/UL — LOW (ref 3.8–5.2)
RBC # FLD: 14.1 % — SIGNIFICANT CHANGE UP (ref 10.3–14.5)
RBC BLD AUTO: SIGNIFICANT CHANGE UP
WBC # BLD: 1.47 K/UL — LOW (ref 3.8–10.5)
WBC # FLD AUTO: 1.47 K/UL — LOW (ref 3.8–10.5)

## 2022-10-17 ENCOUNTER — RESULT REVIEW (OUTPATIENT)
Age: 62
End: 2022-10-17

## 2022-10-17 ENCOUNTER — APPOINTMENT (OUTPATIENT)
Dept: HEMATOLOGY ONCOLOGY | Facility: CLINIC | Age: 62
End: 2022-10-17

## 2022-10-17 LAB
BASOPHILS # BLD AUTO: 0.01 K/UL — SIGNIFICANT CHANGE UP (ref 0–0.2)
BASOPHILS NFR BLD AUTO: 0.5 % — SIGNIFICANT CHANGE UP (ref 0–2)
EOSINOPHIL # BLD AUTO: 0.03 K/UL — SIGNIFICANT CHANGE UP (ref 0–0.5)
EOSINOPHIL NFR BLD AUTO: 1.4 % — SIGNIFICANT CHANGE UP (ref 0–6)
HCT VFR BLD CALC: 26.4 % — LOW (ref 34.5–45)
HGB BLD-MCNC: 8.9 G/DL — LOW (ref 11.5–15.5)
IMM GRANULOCYTES NFR BLD AUTO: 0.5 % — SIGNIFICANT CHANGE UP (ref 0–0.9)
LYMPHOCYTES # BLD AUTO: 0.43 K/UL — LOW (ref 1–3.3)
LYMPHOCYTES # BLD AUTO: 19.5 % — SIGNIFICANT CHANGE UP (ref 13–44)
MCHC RBC-ENTMCNC: 33 PG — SIGNIFICANT CHANGE UP (ref 27–34)
MCHC RBC-ENTMCNC: 33.7 G/DL — SIGNIFICANT CHANGE UP (ref 32–36)
MCV RBC AUTO: 97.8 FL — SIGNIFICANT CHANGE UP (ref 80–100)
MONOCYTES # BLD AUTO: 0.39 K/UL — SIGNIFICANT CHANGE UP (ref 0–0.9)
MONOCYTES NFR BLD AUTO: 17.7 % — HIGH (ref 2–14)
NEUTROPHILS # BLD AUTO: 1.33 K/UL — LOW (ref 1.8–7.4)
NEUTROPHILS NFR BLD AUTO: 60.4 % — SIGNIFICANT CHANGE UP (ref 43–77)
NRBC # BLD: 0 /100 WBCS — SIGNIFICANT CHANGE UP (ref 0–0)
PLATELET # BLD AUTO: 194 K/UL — SIGNIFICANT CHANGE UP (ref 150–400)
RBC # BLD: 2.7 M/UL — LOW (ref 3.8–5.2)
RBC # FLD: 15.2 % — HIGH (ref 10.3–14.5)
WBC # BLD: 2.2 K/UL — LOW (ref 3.8–10.5)
WBC # FLD AUTO: 2.2 K/UL — LOW (ref 3.8–10.5)

## 2022-10-17 PROCEDURE — G6002: CPT | Mod: 26

## 2022-10-18 PROCEDURE — G6002: CPT | Mod: 26

## 2022-10-18 PROCEDURE — 77427 RADIATION TX MANAGEMENT X5: CPT

## 2022-10-19 PROCEDURE — G6002: CPT | Mod: 26

## 2022-10-20 ENCOUNTER — NON-APPOINTMENT (OUTPATIENT)
Age: 62
End: 2022-10-20

## 2022-10-20 VITALS
WEIGHT: 133.05 LBS | HEART RATE: 74 BPM | OXYGEN SATURATION: 100 % | TEMPERATURE: 97.16 F | DIASTOLIC BLOOD PRESSURE: 91 MMHG | RESPIRATION RATE: 18 BRPM | BODY MASS INDEX: 25.12 KG/M2 | HEIGHT: 61 IN | SYSTOLIC BLOOD PRESSURE: 171 MMHG

## 2022-10-20 PROCEDURE — G6002: CPT | Mod: 26

## 2022-10-20 NOTE — DISEASE MANAGEMENT
[Clinical] : TNM Stage: c [FreeTextEntry4] : vulvar cancer [TTNM] : 2 [NTNM] : 1 [MTNM] : 0 [III] : III [de-identified] : 5400cGy [de-identified] : 6,400 cGy [de-identified] : Pelvis/Vulva/Ing LN + Vulva/Inguinal LN CD

## 2022-10-20 NOTE — REVIEW OF SYSTEMS
[Constipation: Grade 1 - Occasional or intermittent symptoms; occasional use of stool softeners, laxatives, dietary modification, or enema] : Constipation: Grade 1 - Occasional or intermittent symptoms; occasional use of stool softeners, laxatives, dietary modification, or enema [Diarrhea: Grade 0] : Diarrhea: Grade 0 [Nausea: Grade 0] : Nausea: Grade 0 [Vomiting: Grade 0] : Vomiting: Grade 0 [FreeTextEntry2] : uses Miralax or suppository if needed  [Fatigue: Grade 0] : Fatigue: Grade 0 [Hematuria: Grade 0] : Hematuria: Grade 0 [Urinary Incontinence: Grade 0] : Urinary Incontinence: Grade 0  [Urinary Retention: Grade 0] : Urinary Retention: Grade 0 [Urinary Tract Pain: Grade 0] : Urinary Tract Pain: Grade 0 [Urinary Urgency: Grade 1 - Present] : Urinary Urgency: Grade 1 - Present [Urinary Frequency: Grade 1 - Present] : Urinary Frequency: Grade 1 - Present [FreeTextEntry9] : slight increase in frequency and urgency  [Pruritus: Grade 0] : Pruritus: Grade 0 [Skin Hyperpigmentation: Grade 1 - Hyperpigmentation covering <10% BSA; no psychosocial impact] : Skin Hyperpigmentation: Grade 1 - Hyperpigmentation covering <10% BSA; no psychosocial impact [Dermatitis Radiation: Grade 2 - Moderate to brisk erythema; patchy moist desquamation, mostly confined to skin folds and creases; moderate edema] : Dermatitis Radiation: Grade 2 - Moderate to brisk erythema; patchy moist desquamation, mostly confined to skin folds and creases; moderate edema [FreeTextEntry6] : using Aquaphor on skin 1-2 times daily

## 2022-10-20 NOTE — VITALS
[Maximal Pain Intensity: 5/10] : 5/10 [Least Pain Intensity: 5/10] : 5/10 [Pain Description/Quality: ___] : Pain description/quality: [unfilled] [Pain Duration: ___] : Pain duration: [unfilled] [Pain Location: ___] : Pain Location: [unfilled] [Pain Interferes with ADLs] : Pain does not interfere with activities of daily living [OTC] : OTC [90: Able to carry normal activity; minor signs or symptoms of disease.] : 90: Able to carry normal activity; minor signs or symptoms of disease.  [ECOG Performance Status: 1 - Restricted in physically strenuous activity but ambulatory and able to carry out work of a light or sedentary nature] : Performance Status: 1 - Restricted in physically strenuous activity but ambulatory and able to carry out work of a light or sedentary nature, e.g., light house work, office work

## 2022-10-20 NOTE — HISTORY OF PRESENT ILLNESS
[FreeTextEntry1] : Ms. GOODMAN is a 62 year old female with vulvar SCC, who was initially diagnosed with HG squamous intraepithelial lesion of the vulva (TATYANA 3) by Dr. Jane, who was lost to follow up until June 2022 when imaging and biopsy confirmed vulvar SCC.\par She presents for on treatment visit today.   \par \par  \par History of Present Illness: \par She initially presented to Dr. Jane in Oct. 2020 reporting worsening vulvar burning and irritation since Feb. 2020. Endometrial biopsy was reportedly postponed secondary to pandemic. \par \par 9/11/20 - Vulva biopsy: High-grade squamous intraepithelial lesion (TATYANA 3) with warty features.\par \par 10/1/20 - Clitoris lesion biopsy: High grade squamous intraepithelial lesion (KIMI 2) with koilocytotic atypia (HPV associated changes).\par  - Pap smear: HPV mRNA HR not detected, negative for intraepithelial lesion or malignancy.\par \par 10/1/20 - Began treatment with Aldara cream per Dr. Jane.\par \par 6/23/22 - She returned to Dr. Jane, states she had been using the Aldara for a few months. She felt that it improved for a while but recently in the past few months she noticed a "lump" on the right side; she complains of constant vulvar burning/irritation, and small amount of blood seen on toilet paper after wiping the area. \par  - Vulva mass biopsy: Squamous cell carcinoma, at least in situ, invasion is not excluded. Inflammation and necrosis are identified.\par \par 6/30/22 - PET CT: FDG avidity in bilateral vulva compatible with newly diagnosed malignancy, with extension of FDG avidity into the vaginal region concerning for disease involvement. FDG avid right inguinal lymph nodes compatible with metastases. Mildly FDG avid single left inguinal lymph node concerning for additional metastatic disease; this may be further evaluated with ultrasound and possible FNA biopsy as indicated. Nonspecific mildly asymmetric FDG activity in the right base of tongue. \par \par  - MRI Pelvis: Large vulvar mass extends to the anterior lower vagina and distal urethra. Right inguinal lymphadenopathy highly suspicious for hermann involvement by carcinoma. Distended bladder.\par \par Dr. Jane therefore sent referral info to Medical Oncology and Radiation Oncology given; the extent of urethral involvement warrants chemoradiation rather than upfront surgical resection.\par \par 7/5/22 - Patient reviewed standard treatment with EBRT and weekly Cisplatin at consultation with Dr. Sanders. \par \par Clinical Course:\par 08/30/2022 - OTV fx 2/32. Today she  is feeling well. Denies any pain. No urinary or bowel complaints. No vaginal discharge/bleeding. Reports her skin is fairly raw in the treatment area at baseline, using Aquaphor daily every time she uses the bathroom. Occasionally has some external bleeding however has noted a decrease over the past few days.\par \par 09/06/2022 - OTV 6/32 fx Ms. Goodman is tolerating treatment well and denies any pain at this time.  Has occasional "burning" but is tolerable.  She states that she has a slight increase in sense of urgency/frequency of urination, denies any incontinence.  Has baseline constipation - uses Miralax or suppository if needed.  Continues to use Aquaphor on her skin.  \par \par 9/13/22: Doing well. Denies any pain. No new urinary complaints. No bowel complaints. Mild erythema noted to vulva. She feels the tumor has decreased in size. Chemo every Tuesday. Appt for medicinal marijuana scheduled Friday.  Discussed skin care, use of promise bottles, sitz baths and pain management.\par \par 9/20/22: Doing well. No pain. No urinary complaints. Moving bowels normally, can experience some constipation , using stool softener. Has nausea, using antiemetic with relief. will give ginger mints \par \par 9/27/22: Feeling well. Pain has increased to 4/10, not taking any analgesia for this. Discussed pain management and verbalized understanding. No urinary complaints. Had one episode of diarrhea which subsided. Discussed use of Imodium. Continues with nausea, taking compazine with relief. Mod erythema noted to b/l groin and vulva. Small blister b/l groin. Skin care discussed, using Aquaphor. \par \par 10/4/22: Chemo held today due (plt 83). Pain has increased. Will send script to pharmacy on file. Reports increase in skin reaction, using Aquaphor due to sulfa allergy.  Discussed use of A&D, desitin\par \par 10/20/22: Treatment was held due to low platelets. She completed planned radiation tomorrow, chemo has been discontinued. She reports pain has decreased, taking Tylenol as needed. No urinary complaints. Moving bowels, but reports hemorrhoids have gotten worse, can have episodes of blood when wiping. Mild erythema/dry desquamation noted to pelvic/groin. Continues to use Aquaphor. She feels tumor is continuing to decrease in size. Small area of moist desquamation noted to vulva. Skin check scheduled for two weeks from completion date on 11/4/22

## 2022-10-21 PROCEDURE — G6002: CPT | Mod: 26

## 2022-11-10 ENCOUNTER — APPOINTMENT (OUTPATIENT)
Dept: OTOLARYNGOLOGY | Facility: CLINIC | Age: 62
End: 2022-11-10

## 2022-11-10 VITALS
SYSTOLIC BLOOD PRESSURE: 161 MMHG | HEART RATE: 69 BPM | DIASTOLIC BLOOD PRESSURE: 75 MMHG | BODY MASS INDEX: 25.11 KG/M2 | WEIGHT: 133 LBS | HEIGHT: 61 IN

## 2022-11-10 PROCEDURE — 31575 DIAGNOSTIC LARYNGOSCOPY: CPT

## 2022-11-10 PROCEDURE — 99214 OFFICE O/P EST MOD 30 MIN: CPT | Mod: 25

## 2022-11-10 NOTE — HISTORY OF PRESENT ILLNESS
[de-identified] : 62 yro female with vulvar cancer (completed CRT end of October 2022)  referred for eval of BOT activity as seen on PET scan done 6/30/2022. Pt here today for 3 month followup. Pt report she has deviated septum and  PND for many years. \par Today pt reports feeling tired as she recovers from CRT. Weight loss of 20 pounds during trtmnet. Weight now stable. \par Denies throat pain,  dysphagia, dyspnea, hemoptysis, dysphonia. No fever, chills.  Tolerating regular diet. \par Pt with hiatal hernia and states she eats smaller bites and chews more to prevent the discomfort associated with that. \par Pt going for MRI in 2 weeks and will follow-up with Rad Dr. Carreon and oncologist Dr. Jane. \par Last PET 6/30/2022.\par Complete review of systems which was performed during a previous encounter was reviewed with the patient and there are no changes except as stated in the HPI section.\par \par

## 2022-11-10 NOTE — CONSULT LETTER
[Dear  ___] : Dear  [unfilled], [Courtesy Letter:] : I had the pleasure of seeing your patient, [unfilled], in my office today. [Please see my note below.] : Please see my note below. [Consult Closing:] : Thank you very much for allowing me to participate in the care of this patient.  If you have any questions, please do not hesitate to contact me. [Sincerely,] : Sincerely, [FreeTextEntry2] : Dr Tiera Mcmillan  [FreeTextEntry3] : \par Complete review of systems which was performed during a previous encounter was reviewed with the patient and there are no changes except as stated in the HPI section.\par

## 2022-11-10 NOTE — REVIEW OF SYSTEMS
History of Present Illness





- Reason for Consult


Consult date: 05/01/17


Reason for consult: Mental Health Evaluation


Requesting physician: NEETA ZALDIVAR





- Chief Complaint


Chief complaint: 


"I did the wrong thing"








- History of Present Psychiatric Illness


43 y.o. AA female presenting to [facility] with depression and hallucinations. Today 

patient is calm, cooperative, with a circumstantial thought process. She was 

vague with why she came to [facility]. She states that she was cursing and wandering 

her neighborhood and the police was called. Shortly after we were talking, she 

stated that her  called the police because they got into an argument. 

She would not give me her  number to confirm her story. She stated that 

she is getting a divorce because he () is "ashamed" of me. I asked her 

to elaborate about her wandering in her community and she would not tell me. 

Patient is disorganized with her story and had to be redirected during our 

conversation. She denies SI/HI's, AVH's, sleep disturbance or a poor appetite. 

She admits to being depressed at this time. She rate her depression 8/10, with 

10 being the worse. She states that she will need lodging once discharged. She 

denies recreational drug use and alcohol consumption (etoh). Patient states 

that she attend sessions at the [facility] and take risperdal and cogentin. 

She stated that she took her psy medication yesterday.








Medications and Allergies


 Allergies











Allergy/AdvReac Type Severity Reaction Status Date / Time


 


No Known Allergies Allergy   Verified 05/01/17 05:08











 Home Medications











 Medication  Instructions  Recorded  Confirmed  Last Taken  Type


 


Benztropine [Cogentin] 2 mg PO DAILY 02/20/17 02/20/17 Unknown History


 


Hydrochlorothiazide [HCTZ] 25 mg PO QDAY 02/20/17 02/20/17 Unknown History


 


LORazepam [Ativan] 1 mg PO BID 02/20/17 02/20/17 Unknown History


 


Olanzapine [OLANZapine] 5 mg PO DAILY 02/20/17 02/20/17 Unknown History


 


Perphenazine 4 mg PO DAILY 02/20/17 02/20/17 Unknown History














Past psychiatric history





- Past Medical History


Past Medical History: No medical history


Past Surgical History: No surgical history





- past Psychiatric treatment and history


Psych: Bipolar, Schizophrenia


psychiatric treatment history: 


Multiple inpatient setting. Denies fam psy hx.








- Social History


Social history: other (HS graduate, homeless)





Mental Status Exam





- Vital signs


 Last Vital Signs











Temp      


 


Pulse      


 


Resp  16   05/01/17 05:18


 


BP      


 


Pulse Ox      














- Exam


Narrative exam: 


ROS (+) depression, (disorganized)





MSE:





 Appearance: disheveled, calm, cooperative 


 Behavior: poor eye contact 


 Speech: regular rate and tone 


 Mood: "I am depressed" 


 Affect: blunted


 Thought Process: circumstantial 


 Thought Content: denies SI/HI's and AVH's 


 Cognition: A/C x3


 Insight: poor


 Judgment: poor








Results


Result Diagrams: 


 05/01/17 09:55





 05/01/17 07:58


 Abnormal lab results











  05/01/17 05/01/17 05/01/17 Range/Units





  07:58 07:58 09:55 


 


RDW    12.8 L  (13.2-15.2)  %


 


Mono % (Auto)    8.0 H  (0.0-7.3)  %


 


Seg Neutrophils %    74.9 H  (40.0-70.0)  %


 


Potassium  3.0 L    (3.6-5.0)  mmol/L


 


Chloride  95.7 L    ()  mmol/L


 


Carbon Dioxide  20 L    (22-30)  mmol/L


 


Glucose  133 H    ()  mg/dL


 


Salicylates   < 0.3 L   (2.8-20.0)  mg/dL








All other labs normal.








Assessment and Plan


Assessment and plan: 


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 43 y.o. AA female presenting to [facility] with 

depression and hallucinations. Today patient is calm, cooperative, with a 

circumstantial thought process. She was vague with why she came to [facility]. She 

states that she was cursing and wandering her neighborhood and the police was 

called. Shortly after we were talking, she stated that her  called the 

police because they got into an argument.





DD: R/O Bipolar, Schizophrenia, Schizoaffective DO





Recommendation/Plan: Continue 1013 with possible placement to inpatient psy 

services or outpatient services.  involvement, patient is 

homeless. Obtain collateral information to help determine further treatment. 

Start Risperdal 2 mg PO HS for psychosis and Cogentin 0.5 mg PO HS EPS 

prevention. Discussed possible metabolic side effects from Zyprexa. [As Noted in HPI] : as noted in HPI

## 2022-11-23 ENCOUNTER — APPOINTMENT (OUTPATIENT)
Dept: MRI IMAGING | Facility: CLINIC | Age: 62
End: 2022-11-23

## 2022-11-23 PROCEDURE — A9585: CPT | Mod: JW

## 2022-11-23 PROCEDURE — 72197 MRI PELVIS W/O & W/DYE: CPT

## 2022-11-29 ENCOUNTER — APPOINTMENT (OUTPATIENT)
Dept: RADIATION ONCOLOGY | Facility: CLINIC | Age: 62
End: 2022-11-29

## 2022-11-29 VITALS
SYSTOLIC BLOOD PRESSURE: 157 MMHG | HEART RATE: 72 BPM | RESPIRATION RATE: 17 BRPM | OXYGEN SATURATION: 100 % | WEIGHT: 134.88 LBS | HEIGHT: 61 IN | DIASTOLIC BLOOD PRESSURE: 84 MMHG | TEMPERATURE: 97.7 F | BODY MASS INDEX: 25.47 KG/M2

## 2022-11-29 PROCEDURE — 99024 POSTOP FOLLOW-UP VISIT: CPT

## 2022-11-29 RX ORDER — ONDANSETRON 8 MG/1
8 TABLET ORAL
Qty: 30 | Refills: 2 | Status: DISCONTINUED | COMMUNITY
Start: 2022-10-11 | End: 2022-11-29

## 2022-11-29 RX ORDER — OXYCODONE 5 MG/1
5 TABLET ORAL
Qty: 30 | Refills: 0 | Status: DISCONTINUED | COMMUNITY
Start: 2022-10-04 | End: 2022-11-29

## 2022-11-29 RX ORDER — PROCHLORPERAZINE MALEATE 10 MG/1
10 TABLET ORAL EVERY 6 HOURS
Qty: 20 | Refills: 4 | Status: DISCONTINUED | COMMUNITY
Start: 2022-08-22 | End: 2022-11-29

## 2022-12-06 NOTE — HISTORY OF PRESENT ILLNESS
[FreeTextEntry1] : Ms. MATT is a 62 year old female with vulvar SCC, who was initially diagnosed with HG squamous intraepithelial lesion of the vulva (TATYANA 3) by Dr. Jane, who was lost to follow up until June 2022 when imaging and biopsy confirmed vulvar SCC. She received a total dose of 6400 cGy to the pelvis/vulva/inguinal lymph node with a cone down. Post treatment MRI 11/23/22: complete vs near complete treatment response; the vulvar mass measuring 2.4 x 0.8 cm markedly decrease in size and predominantly fibrotic, with areas equivocal for small residual disease. Interval decrease in right inguinal adenopathy now 1.0 x 0.6 cm previously 1.8 x 1.3 cm.\par \par Overall feeling well. Denies any pain currently. Reports occasional pain with urination. Denies urinary frequency or urgency. Nocturia has improved, up to 2 x a night. Moving bowels normally. She had an episode of spotting with wiping after BM which has not occurred again. Denies any vaginal bleeding. Encouraged to follow up with gyn onc and med onc

## 2022-12-06 NOTE — PHYSICAL EXAM
[de-identified] : prominent clitoris,soft with a 1 cm soft relatively dicreet nodule [de-identified] : no plapble inguinal adenopathy

## 2022-12-29 ENCOUNTER — APPOINTMENT (OUTPATIENT)
Dept: GYNECOLOGIC ONCOLOGY | Facility: CLINIC | Age: 62
End: 2022-12-29
Payer: COMMERCIAL

## 2022-12-29 VITALS
HEIGHT: 61 IN | HEART RATE: 80 BPM | BODY MASS INDEX: 25.3 KG/M2 | SYSTOLIC BLOOD PRESSURE: 183 MMHG | WEIGHT: 134 LBS | DIASTOLIC BLOOD PRESSURE: 80 MMHG

## 2022-12-29 PROCEDURE — 99213 OFFICE O/P EST LOW 20 MIN: CPT

## 2022-12-29 NOTE — PAST MEDICAL HISTORY
[Postmenopausal] : The patient is postmenopausal [Menarche Age ____] : age at menarche was [unfilled] [Definite ___ (Date)] : the last menstrual period was [unfilled]

## 2022-12-30 NOTE — HISTORY OF PRESENT ILLNESS
[FreeTextEntry1] : Ms. RASHAUN MATT initially presented for evaluation of TATYANA 3 in 10/2020 and did not return for followup until 6/2022 when she had developed invasive vulvar SCC..\par Biopsy in 9/2020 showed TATYANA 3 extending to the tissue edges. \par She had an episode of vaginal spotting in 2019; in retrospect may have been from vulva .  Endometrial biopsy was reportedly postponed secondary to pandemic.  Pelvic US in 2019 showed an endometrial lining of 3-4 mm. 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 Vagina is distended by gel which was administered for this exam. LYMPH NODES: Three adjacent abnormal right inguinal lymph nodes measuring up to 1.8 x 1.3 cm\par 6/30/22: PET/CT - IMPRESSION: Abnormal FDG-PET/CT scan.\par 1. FDG avidity in bilateral vulva compatible with newly diagnosed malignancy, with extension of FDG avidity into the vaginal region concerning for disease involvement.\par 2. FDG avid right inguinal lymph nodes compatible with metastases. Mildly FDG avid single left inguinal lymph node concerning for additional metastatic disease; this may be further evaluated with ultrasound and possible FNA biopsy as indicated.\par 3. Nonspecific mildly asymmetric FDG activity in the right base of tongue. Correlate clinically or with direct visualization.\par \par She initiated chemoradiation with Dr. Carreon and Dr. Sanders. \par She received a total dose of 6400 cGy to the pelvis/vulva/inguinal lymph node with a cone down. Post treatment MRI 11/23/22: complete vs near complete treatment response; the vulvar mass measuring 2.4 x 0.8 cm markedly decrease in size and predominantly fibrotic, with areas equivocal for small residual disease. Interval decrease in right inguinal adenopathy now 1.0 x 0.6 cm previously 1.8 x 1.3 cm.\par \par She returns today for followup exam. \par She has some mild residual tenderness in the periclitoral area but denies any bleeding or any other associated signs and symptoms.\par \par Health Maintenance:\par Mammo- 8/2022 BIRADS2\par Colonoscopy- 2015, normal (first colonoscopy had a tubular adenoma)\par \par Referring MD- Dr. Renetta Thompson (hasn’t seen since 2020)\par PCP- Dr. Ron Coburn\par GI- Dr. Stevie Wang (retired)\par - Dr. Goldberg

## 2022-12-30 NOTE — LETTER BODY
[Dear  ___] : Dear  [unfilled], [I recently saw our patient [unfilled] for a follow-up visit.] : I recently saw our patient, [unfilled] for a follow-up visit. [Attached please find my note.] : Attached please find my note. [FreeTextEntry2] : She has had a likely complete response to chemoradiation treatment. She will see me in 4 months for surveillance. I will keep you updated on her progress. Thank you again for referring this tyree patient.\par \par

## 2023-01-28 ENCOUNTER — APPOINTMENT (OUTPATIENT)
Dept: NUCLEAR MEDICINE | Facility: CLINIC | Age: 63
End: 2023-01-28
Payer: COMMERCIAL

## 2023-01-28 ENCOUNTER — OUTPATIENT (OUTPATIENT)
Dept: OUTPATIENT SERVICES | Facility: HOSPITAL | Age: 63
LOS: 1 days | End: 2023-01-28
Payer: COMMERCIAL

## 2023-01-28 DIAGNOSIS — C51.9 MALIGNANT NEOPLASM OF VULVA, UNSPECIFIED: ICD-10-CM

## 2023-01-28 PROCEDURE — 78815 PET IMAGE W/CT SKULL-THIGH: CPT | Mod: 26,PS

## 2023-01-28 PROCEDURE — A9552: CPT

## 2023-01-28 PROCEDURE — 78815 PET IMAGE W/CT SKULL-THIGH: CPT

## 2023-02-16 ENCOUNTER — APPOINTMENT (OUTPATIENT)
Dept: RADIATION ONCOLOGY | Facility: CLINIC | Age: 63
End: 2023-02-16
Payer: COMMERCIAL

## 2023-02-16 VITALS
BODY MASS INDEX: 24.92 KG/M2 | OXYGEN SATURATION: 97 % | RESPIRATION RATE: 18 BRPM | DIASTOLIC BLOOD PRESSURE: 74 MMHG | HEART RATE: 84 BPM | WEIGHT: 132 LBS | SYSTOLIC BLOOD PRESSURE: 172 MMHG | HEIGHT: 61 IN | TEMPERATURE: 97.7 F

## 2023-02-16 DIAGNOSIS — R94.8 ABNORMAL RESULTS OF FUNCTION STUDIES OF OTHER ORGANS AND SYSTEMS: ICD-10-CM

## 2023-02-16 PROCEDURE — 99213 OFFICE O/P EST LOW 20 MIN: CPT

## 2023-02-16 NOTE — PHYSICAL EXAM
[] : no respiratory distress [Abdomen Soft] : soft [No Rectal Mass] : no rectal mass [Normal Vagina] : normal vagina without lesions or masses [de-identified] : min telangectasia and erythema in vulva no residual mass 3 areas of excoriation, on onrigh and 2 on left

## 2023-02-16 NOTE — HISTORY OF PRESENT ILLNESS
[FreeTextEntry1] : Ms. MATT is a 62 year old female with vulvar SCC, who was initially diagnosed with HG squamous intraepithelial lesion of the vulva (TATYANA 3) by Dr. Jane, who was lost to follow up until June 2022 when imaging and biopsy confirmed vulvar SCC. She received a total dose of 6400 cGy to the pelvis/vulva/inguinal lymph node with a cone down. Post treatment MRI 11/23/22: complete vs near complete treatment response; the vulvar mass measuring 2.4 x 0.8 cm markedly decrease in size and predominantly fibrotic, with areas equivocal for small residual disease. Interval decrease in right inguinal adenopathy now 1.0 x 0.6 cm previously 1.8 x 1.3 cm.\par \par Last seen in Nov 2022 for PTE. Overall feeling well. Denies any pain currently. Reports occasional pain with urination. Denies urinary frequency or urgency. Nocturia has improved, up to 2 x a night. Moving bowels normally. She had an episode of spotting with wiping after BM which has not occurred again. Denies any vaginal bleeding. Encouraged to follow up with gyn onc and med onc\par \par 2/16/23:Today, feeling well. Fatigue has improved. She reports left posterior hip to knee pulling pain which requires Advil 400mg daily which helps. Pain occurs with sitting for a longer period of time. Good appetite. No urinary or bowel complaints. No vaginal discharge or bleeding. Used vaginal dilator with no pain. Will provide silicone dilator to try. Reports some clitoral irritation. \par \par 01/28/23: PET/CT: Sacroiliac findings suggestive of postradiation insufficiency fractures. Foci in the precarinal and bilateral hilar regions in the chest are nonspecific and may be reactive. Attention to followup. Otherwise response to therapy observed at vulva and previously seen inguinal lymph nodes, with essentially resolved activity and anatomic regression.

## 2023-03-06 ENCOUNTER — APPOINTMENT (OUTPATIENT)
Dept: ORTHOPEDIC SURGERY | Facility: CLINIC | Age: 63
End: 2023-03-06

## 2023-03-09 ENCOUNTER — OUTPATIENT (OUTPATIENT)
Dept: OUTPATIENT SERVICES | Facility: HOSPITAL | Age: 63
LOS: 1 days | Discharge: HOME | End: 2023-03-09

## 2023-03-09 DIAGNOSIS — C51.9 MALIGNANT NEOPLASM OF VULVA, UNSPECIFIED: ICD-10-CM

## 2023-03-10 ENCOUNTER — RESULT REVIEW (OUTPATIENT)
Age: 63
End: 2023-03-10

## 2023-03-10 ENCOUNTER — APPOINTMENT (OUTPATIENT)
Dept: HEMATOLOGY ONCOLOGY | Facility: CLINIC | Age: 63
End: 2023-03-10
Payer: COMMERCIAL

## 2023-03-10 VITALS
WEIGHT: 131.59 LBS | BODY MASS INDEX: 24.84 KG/M2 | DIASTOLIC BLOOD PRESSURE: 73 MMHG | RESPIRATION RATE: 17 BRPM | TEMPERATURE: 206.6 F | SYSTOLIC BLOOD PRESSURE: 174 MMHG | HEART RATE: 83 BPM | OXYGEN SATURATION: 98 % | HEIGHT: 60.98 IN

## 2023-03-10 LAB
BASOPHILS # BLD AUTO: 0.04 K/UL — SIGNIFICANT CHANGE UP (ref 0–0.2)
BASOPHILS NFR BLD AUTO: 0.9 % — SIGNIFICANT CHANGE UP (ref 0–2)
EOSINOPHIL # BLD AUTO: 0.17 K/UL — SIGNIFICANT CHANGE UP (ref 0–0.5)
EOSINOPHIL NFR BLD AUTO: 3.6 % — SIGNIFICANT CHANGE UP (ref 0–6)
HCT VFR BLD CALC: 33.5 % — LOW (ref 34.5–45)
HGB BLD-MCNC: 11.1 G/DL — LOW (ref 11.5–15.5)
IMM GRANULOCYTES NFR BLD AUTO: 0.2 % — SIGNIFICANT CHANGE UP (ref 0–0.9)
LYMPHOCYTES # BLD AUTO: 0.83 K/UL — LOW (ref 1–3.3)
LYMPHOCYTES # BLD AUTO: 17.7 % — SIGNIFICANT CHANGE UP (ref 13–44)
MCHC RBC-ENTMCNC: 32.6 PG — SIGNIFICANT CHANGE UP (ref 27–34)
MCHC RBC-ENTMCNC: 33.1 G/DL — SIGNIFICANT CHANGE UP (ref 32–36)
MCV RBC AUTO: 98.5 FL — SIGNIFICANT CHANGE UP (ref 80–100)
MONOCYTES # BLD AUTO: 0.45 K/UL — SIGNIFICANT CHANGE UP (ref 0–0.9)
MONOCYTES NFR BLD AUTO: 9.6 % — SIGNIFICANT CHANGE UP (ref 2–14)
NEUTROPHILS # BLD AUTO: 3.18 K/UL — SIGNIFICANT CHANGE UP (ref 1.8–7.4)
NEUTROPHILS NFR BLD AUTO: 68 % — SIGNIFICANT CHANGE UP (ref 43–77)
NRBC # BLD: 0 /100 WBCS — SIGNIFICANT CHANGE UP (ref 0–0)
PLATELET # BLD AUTO: 174 K/UL — SIGNIFICANT CHANGE UP (ref 150–400)
RBC # BLD: 3.4 M/UL — LOW (ref 3.8–5.2)
RBC # FLD: 12.9 % — SIGNIFICANT CHANGE UP (ref 10.3–14.5)
WBC # BLD: 4.68 K/UL — SIGNIFICANT CHANGE UP (ref 3.8–10.5)
WBC # FLD AUTO: 4.68 K/UL — SIGNIFICANT CHANGE UP (ref 3.8–10.5)

## 2023-03-10 PROCEDURE — 99213 OFFICE O/P EST LOW 20 MIN: CPT

## 2023-03-10 NOTE — HISTORY OF PRESENT ILLNESS
[Disease: _____________________] : Disease: [unfilled] [AJCC Stage: ____] : AJCC Stage: [unfilled] [de-identified] : 61 year old woman with hx of HTN, celiac disease presenting for initial consultation of vulvar mass.\par \par Pt states she was diagnosed with TATYANA 3 10/2020 by Dr. Jane and was started on Aldara. She returned to her clinic 3 months ago as patient discovered a vaginal cyst. She had a biopsy performed 6/23/22.\par \par MR Pelvis 6/30/22: \par IMPRESSION:\par Large vulvar mass 5.5 x 4.8 x 2.5 cm  extends to the anterior lower vagina and distal urethra.\par Right inguinal lymphadenopathy  1.8 x 1.3 cm highly suspicious for hermann involvement by carcinoma\par \par PET/CT 6/2022\par IMPRESSION: Abnormal FDG-PET/CT scan.\par 1. FDG avidity in bilateral vulva compatible with newly diagnosed malignancy, with extension of FDG avidity into the vaginal region concerning for disease involvement.\par 2. FDG avid right inguinal lymph nodes compatible with metastases. Mildly FDG avid single left inguinal lymph node concerning for additional metastatic disease; this may be further evaluated with ultrasound and possible FNA biopsy as indicated.\par Feeling well. Endorses slight fatigue, felt it may be related to work. Appetite well but endorses weight loss,  States had painful urination a over a year but has improved. Denies fevers, SOB, CP, AP, hematuria, \par slight bleeding with wiping and associated pain. \par \par LMP 2008  [de-identified] : Squamous Cell carcinoma, vulvar [de-identified] : She has leg pains, seeing Dr. Mesa. She has a PET in April.\par No bleeding, no swelling. Last mammogram was in August, 2022\par Colonoscopy due this year.\par She has a good appetite.

## 2023-03-13 LAB
ALBUMIN SERPL ELPH-MCNC: 4.2 G/DL
ALP BLD-CCNC: 92 U/L
ALT SERPL-CCNC: 10 U/L
ANION GAP SERPL CALC-SCNC: 12 MMOL/L
AST SERPL-CCNC: 18 U/L
BILIRUB SERPL-MCNC: 0.2 MG/DL
BUN SERPL-MCNC: 24 MG/DL
CALCIUM SERPL-MCNC: 9.9 MG/DL
CHLORIDE SERPL-SCNC: 104 MMOL/L
CO2 SERPL-SCNC: 25 MMOL/L
CREAT SERPL-MCNC: 0.92 MG/DL
EGFR: 71 ML/MIN/1.73M2
GLUCOSE SERPL-MCNC: 94 MG/DL
POTASSIUM SERPL-SCNC: 4.5 MMOL/L
PROT SERPL-MCNC: 6.7 G/DL
SODIUM SERPL-SCNC: 142 MMOL/L

## 2023-03-25 ENCOUNTER — APPOINTMENT (OUTPATIENT)
Dept: MRI IMAGING | Facility: CLINIC | Age: 63
End: 2023-03-25
Payer: COMMERCIAL

## 2023-03-25 ENCOUNTER — OUTPATIENT (OUTPATIENT)
Dept: OUTPATIENT SERVICES | Facility: HOSPITAL | Age: 63
LOS: 1 days | End: 2023-03-25
Payer: COMMERCIAL

## 2023-03-25 DIAGNOSIS — Z00.8 ENCOUNTER FOR OTHER GENERAL EXAMINATION: ICD-10-CM

## 2023-03-25 DIAGNOSIS — C51.9 MALIGNANT NEOPLASM OF VULVA, UNSPECIFIED: ICD-10-CM

## 2023-03-25 DIAGNOSIS — Z92.3 PERSONAL HISTORY OF IRRADIATION: ICD-10-CM

## 2023-03-25 PROCEDURE — 72197 MRI PELVIS W/O & W/DYE: CPT

## 2023-03-25 PROCEDURE — 72197 MRI PELVIS W/O & W/DYE: CPT | Mod: 26

## 2023-03-25 PROCEDURE — A9585: CPT

## 2023-03-27 ENCOUNTER — APPOINTMENT (OUTPATIENT)
Dept: ORTHOPEDIC SURGERY | Facility: CLINIC | Age: 63
End: 2023-03-27

## 2023-03-30 ENCOUNTER — APPOINTMENT (OUTPATIENT)
Dept: ORTHOPEDIC SURGERY | Facility: CLINIC | Age: 63
End: 2023-03-30
Payer: COMMERCIAL

## 2023-03-30 VITALS
HEART RATE: 74 BPM | DIASTOLIC BLOOD PRESSURE: 80 MMHG | BODY MASS INDEX: 24.77 KG/M2 | SYSTOLIC BLOOD PRESSURE: 167 MMHG | WEIGHT: 131 LBS | OXYGEN SATURATION: 98 %

## 2023-03-30 PROCEDURE — 99203 OFFICE O/P NEW LOW 30 MIN: CPT

## 2023-03-30 PROCEDURE — 72190 X-RAY EXAM OF PELVIS: CPT

## 2023-04-30 NOTE — HISTORY OF PRESENT ILLNESS
[FreeTextEntry1] : Is a 62-year-old female with a history of celiac disease and hypertension who was diagnosed in June 2022 with large vulvar mass consistent with squamous cell.  She has been having some back pain as well.  She recently had a PET/CT as well as an MRI scan.  She was found to have some questionable insufficiency fractures versus lesions and was sent to me for further evaluation.  She currently is able to walk but that she does have lower back pain.  She has no obvious radiculopathy other than some pain radiating around her waist.  She has no numbness or tingling. [___ mths] : [unfilled] month(s) ago [Worsening] : worsening [3] : currently ~his/her~ pain is 3 out of 10

## 2023-04-30 NOTE — DATA REVIEWED
ERP at bedside   [Imaging Present] : Present [de-identified] : X-rays today multiple views of the pelvis including inlet and outlet view show no significant bony abnormalities.  There is no significant area of lucency or tumor seen.\par \par MRI from March 25, 2023 shows:\par Impression:\par \par Acute bilateral sacral alar insufficiency fractures with extension of fracture lines into the S1 and S2 vertebral bodies.\par \par Extensive edema along the iliac aspect of the sacroiliac joints bilaterally, which may be related to secondary degenerative changes of the sacroiliac joints. However, underlying insufficiency fractures can not be excluded.\par \par Incompletely evaluated edema with T1 marrow placement within the LEFT pubic body, which may represent an additional fracture. However, an underlying pathologic marrow replacing lesion within the LEFT pubic body, such as metastatic disease, cannot be excluded and is also within the differential. Consider short-term interval follow-up MRI of the yuly pelvis with full field of view for further evaluation.\par \par Questionable acute nondisplaced fracture of the RIGHT pubic body, which is incompletely evaluated on this exam, as it is only seen on the sagittal images.\par \par Edema within the transverse processes of L5 bilaterally, which may be related to nondisplaced fractures or be related to enthesopathic changes.\par

## 2023-04-30 NOTE — REVIEW OF SYSTEMS
[Feeling Tired] : not feeling tired [Joint Stiffness] : joint stiffness [Joint Pain] : joint pain [Nl] : Hematologic/Lymphatic

## 2023-04-30 NOTE — DISCUSSION/SUMMARY
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  This is most likely because of her radiation to her vulva.  This has seemed to cure her overall problems but probably is in the area causing in insufficiency fractures.  My recommendation is to do some physical therapy and/or see physiatry.  I think they may be beneficial.  I do not think she needs any stabilization.  I will follow-up again with x-rays in 4 months or she can follow-up with neurosurgery as well with regard to these lesions.\par \par If imaging was ordered, the patient was told to make an appointment to review findings right after all imaging is completed.\par \par We discussed risks, benefits and alternatives. Rationale of care was reviewed and all questions were answered. Patient (and family) had all questions answered to her degree of the level of satisfaction. Patient (and family) expressed understanding and interest in proceeding with the plan as outlined.\par \par \par \par \par This note was done with a voice recognition transcription software and any typos are related to this rather than medical error. Surgical risks reviewed. Patient (and family) had all questions answered to an agreeable level of satisfaction. Patient (and family) expressed understanding and interest in proceeding with the plan as outlined.  \par

## 2023-04-30 NOTE — PHYSICAL EXAM
[FreeTextEntry1] : On exam the patient stands in good balance.  She occasionally has some tenderness of her back.  She has no obvious radiculopathic signs.  She is able to walk.  She has some tightness when testing hamstring.  She is able to walk.  She is able to do single-leg stance.  She has no pain in either hip.  She has normal sensation distally.  She has no bowel or bladder problems.  Her calves are soft and she is neurovascularly intact. [General Appearance - Well-Appearing] : Well appearing [General Appearance - Well Nourished] : well nourished [Oriented To Time, Place, And Person] : Oriented to person, place, and time [Impaired Insight] : Insight and judgment were intact [Mood] : the mood was normal [Affect] : The affect was normal. [Sclera] : the sclera and conjunctiva were normal [Neck Cervical Mass (___cm)] : no neck mass was observed [Heart Rate And Rhythm] : heart rate was normal and rhythm regular [Abdomen Soft] : Soft [] : No respiratory distress [Normal Station and Gait] : gait and station were normal [Skin Changes - Describe changes:] : No skin changes noted [Full ROM Unless otherwise noted:] : Full range of motion unless otherwise noted: [LE  Motor Strength Normal unless otherwise noted:] : 5/5 strength in bilateral lower extemities unless otherwise noted. [Normal] : Sensation intact to light touch. [Tenderness] : tenderness

## 2023-05-22 ENCOUNTER — OUTPATIENT (OUTPATIENT)
Dept: OUTPATIENT SERVICES | Facility: HOSPITAL | Age: 63
LOS: 1 days | Discharge: HOME | End: 2023-05-22

## 2023-05-22 DIAGNOSIS — C51.9 MALIGNANT NEOPLASM OF VULVA, UNSPECIFIED: ICD-10-CM

## 2023-05-25 ENCOUNTER — APPOINTMENT (OUTPATIENT)
Dept: GYNECOLOGIC ONCOLOGY | Facility: CLINIC | Age: 63
End: 2023-05-25
Payer: COMMERCIAL

## 2023-05-25 VITALS
HEART RATE: 93 BPM | BODY MASS INDEX: 25.91 KG/M2 | HEIGHT: 60 IN | SYSTOLIC BLOOD PRESSURE: 176 MMHG | DIASTOLIC BLOOD PRESSURE: 83 MMHG | WEIGHT: 132 LBS

## 2023-05-25 PROCEDURE — 99213 OFFICE O/P EST LOW 20 MIN: CPT | Mod: 25

## 2023-05-25 PROCEDURE — 56606 BIOPSY OF VULVA/PERINEUM: CPT

## 2023-05-25 PROCEDURE — 56605 BIOPSY OF VULVA/PERINEUM: CPT

## 2023-05-27 ENCOUNTER — OUTPATIENT (OUTPATIENT)
Dept: OUTPATIENT SERVICES | Facility: HOSPITAL | Age: 63
LOS: 1 days | End: 2023-05-27
Payer: COMMERCIAL

## 2023-05-27 ENCOUNTER — APPOINTMENT (OUTPATIENT)
Dept: NUCLEAR MEDICINE | Facility: CLINIC | Age: 63
End: 2023-05-27
Payer: COMMERCIAL

## 2023-05-27 DIAGNOSIS — R94.8 ABNORMAL RESULTS OF FUNCTION STUDIES OF OTHER ORGANS AND SYSTEMS: ICD-10-CM

## 2023-05-27 DIAGNOSIS — C51.9 MALIGNANT NEOPLASM OF VULVA, UNSPECIFIED: ICD-10-CM

## 2023-05-27 DIAGNOSIS — Z92.3 PERSONAL HISTORY OF IRRADIATION: ICD-10-CM

## 2023-05-27 PROCEDURE — A9552: CPT

## 2023-05-27 PROCEDURE — 78815 PET IMAGE W/CT SKULL-THIGH: CPT | Mod: 26,PS

## 2023-05-27 PROCEDURE — 78815 PET IMAGE W/CT SKULL-THIGH: CPT

## 2023-06-06 ENCOUNTER — RESULT REVIEW (OUTPATIENT)
Age: 63
End: 2023-06-06

## 2023-06-06 ENCOUNTER — APPOINTMENT (OUTPATIENT)
Dept: HEMATOLOGY ONCOLOGY | Facility: CLINIC | Age: 63
End: 2023-06-06
Payer: COMMERCIAL

## 2023-06-06 VITALS
BODY MASS INDEX: 25.83 KG/M2 | OXYGEN SATURATION: 97 % | SYSTOLIC BLOOD PRESSURE: 175 MMHG | WEIGHT: 132.28 LBS | HEART RATE: 80 BPM | DIASTOLIC BLOOD PRESSURE: 67 MMHG | TEMPERATURE: 97 F | RESPIRATION RATE: 16 BRPM

## 2023-06-06 LAB
BASOPHILS # BLD AUTO: 0.03 K/UL — SIGNIFICANT CHANGE UP (ref 0–0.2)
BASOPHILS NFR BLD AUTO: 0.6 % — SIGNIFICANT CHANGE UP (ref 0–2)
EOSINOPHIL # BLD AUTO: 0.17 K/UL — SIGNIFICANT CHANGE UP (ref 0–0.5)
EOSINOPHIL NFR BLD AUTO: 3.3 % — SIGNIFICANT CHANGE UP (ref 0–6)
HCT VFR BLD CALC: 34.5 % — SIGNIFICANT CHANGE UP (ref 34.5–45)
HGB BLD-MCNC: 11.4 G/DL — LOW (ref 11.5–15.5)
IMM GRANULOCYTES NFR BLD AUTO: 0.2 % — SIGNIFICANT CHANGE UP (ref 0–0.9)
LYMPHOCYTES # BLD AUTO: 0.87 K/UL — LOW (ref 1–3.3)
LYMPHOCYTES # BLD AUTO: 16.8 % — SIGNIFICANT CHANGE UP (ref 13–44)
MCHC RBC-ENTMCNC: 32.9 PG — SIGNIFICANT CHANGE UP (ref 27–34)
MCHC RBC-ENTMCNC: 33 G/DL — SIGNIFICANT CHANGE UP (ref 32–36)
MCV RBC AUTO: 99.7 FL — SIGNIFICANT CHANGE UP (ref 80–100)
MONOCYTES # BLD AUTO: 0.5 K/UL — SIGNIFICANT CHANGE UP (ref 0–0.9)
MONOCYTES NFR BLD AUTO: 9.7 % — SIGNIFICANT CHANGE UP (ref 2–14)
NEUTROPHILS # BLD AUTO: 3.6 K/UL — SIGNIFICANT CHANGE UP (ref 1.8–7.4)
NEUTROPHILS NFR BLD AUTO: 69.4 % — SIGNIFICANT CHANGE UP (ref 43–77)
NRBC # BLD: 0 /100 WBCS — SIGNIFICANT CHANGE UP (ref 0–0)
PLATELET # BLD AUTO: 196 K/UL — SIGNIFICANT CHANGE UP (ref 150–400)
RBC # BLD: 3.46 M/UL — LOW (ref 3.8–5.2)
RBC # FLD: 12.4 % — SIGNIFICANT CHANGE UP (ref 10.3–14.5)
WBC # BLD: 5.18 K/UL — SIGNIFICANT CHANGE UP (ref 3.8–10.5)
WBC # FLD AUTO: 5.18 K/UL — SIGNIFICANT CHANGE UP (ref 3.8–10.5)

## 2023-06-06 PROCEDURE — 99214 OFFICE O/P EST MOD 30 MIN: CPT

## 2023-06-06 NOTE — HISTORY OF PRESENT ILLNESS
[Disease: _____________________] : Disease: [unfilled] [AJCC Stage: ____] : AJCC Stage: [unfilled] [de-identified] : 61 year old woman with hx of HTN, celiac disease presenting for initial consultation of vulvar mass.\par \par Pt states she was diagnosed with TATYANA 3 10/2020 by Dr. Jane and was started on Aldara. She returned to her clinic 3 months ago as patient discovered a vaginal cyst. She had a biopsy performed 6/23/22.\par \par MR Pelvis 6/30/22: \par IMPRESSION:\par Large vulvar mass 5.5 x 4.8 x 2.5 cm  extends to the anterior lower vagina and distal urethra.\par Right inguinal lymphadenopathy  1.8 x 1.3 cm highly suspicious for hermann involvement by carcinoma\par \par PET/CT 6/2022\par IMPRESSION: Abnormal FDG-PET/CT scan.\par 1. FDG avidity in bilateral vulva compatible with newly diagnosed malignancy, with extension of FDG avidity into the vaginal region concerning for disease involvement.\par 2. FDG avid right inguinal lymph nodes compatible with metastases. Mildly FDG avid single left inguinal lymph node concerning for additional metastatic disease; this may be further evaluated with ultrasound and possible FNA biopsy as indicated.\par Feeling well. Endorses slight fatigue, felt it may be related to work. Appetite well but endorses weight loss,  States had painful urination a over a year but has improved. Denies fevers, SOB, CP, AP, hematuria, \par slight bleeding with wiping and associated pain. \par \par LMP 2008  [de-identified] : Squamous Cell carcinoma, vulvar [de-identified] : She fell on Sunday, she had pelvic spasm and fell. No LOC.\par She had PET last month showing a new left pubic ramus lesion.\par

## 2023-06-07 LAB
ALBUMIN SERPL ELPH-MCNC: 4.2 G/DL
ALP BLD-CCNC: 85 U/L
ALT SERPL-CCNC: 11 U/L
ANION GAP SERPL CALC-SCNC: 10 MMOL/L
AST SERPL-CCNC: 19 U/L
BILIRUB SERPL-MCNC: 0.2 MG/DL
BUN SERPL-MCNC: 27 MG/DL
CALCIUM SERPL-MCNC: 9.3 MG/DL
CHLORIDE SERPL-SCNC: 106 MMOL/L
CO2 SERPL-SCNC: 28 MMOL/L
CREAT SERPL-MCNC: 0.9 MG/DL
EGFR: 73 ML/MIN/1.73M2
GLUCOSE SERPL-MCNC: 108 MG/DL
POTASSIUM SERPL-SCNC: 4.9 MMOL/L
PROT SERPL-MCNC: 6.7 G/DL
SODIUM SERPL-SCNC: 143 MMOL/L

## 2023-06-15 ENCOUNTER — APPOINTMENT (OUTPATIENT)
Dept: RADIATION ONCOLOGY | Facility: CLINIC | Age: 63
End: 2023-06-15
Payer: COMMERCIAL

## 2023-06-15 PROCEDURE — 99212 OFFICE O/P EST SF 10 MIN: CPT

## 2023-06-15 NOTE — VITALS
[Least Pain Intensity: 6/10] : 6/10 [Pain Description/Quality: ___] : Pain description/quality: [unfilled] [Pain Duration: ___] : Pain duration: [unfilled] [Pain Location: ___] : Pain Location: [unfilled] [NSAID/Non-Opioid] : NSAID/Non-Opioid

## 2023-06-21 NOTE — HISTORY OF PRESENT ILLNESS
[FreeTextEntry1] : Ms. MATT is a 62 year old female with vulvar SCC, who was initially diagnosed with HG squamous intraepithelial lesion of the vulva (TATYANA 3) by Dr. Jane, who was lost to follow up until June 2022 when imaging and biopsy confirmed vulvar SCC. She received a total dose of 6400 cGy to the pelvis/vulva/inguinal lymph node with a cone down. Post treatment MRI 11/23/22: complete vs near complete treatment response; the vulvar mass measuring 2.4 x 0.8 cm markedly decrease in size and predominantly fibrotic, with areas equivocal for small residual disease. Interval decrease in right inguinal adenopathy now 1.0 x 0.6 cm previously 1.8 x 1.3 cm.\par \par Last seen in Feb 2023 , feeling well. Fatigue has improved. She reports left posterior hip to knee pulling pain which requires Advil 400 mg daily which helps. Pain occurs with sitting for a longer period of time. Good appetite. No urinary or bowel complaints. No vaginal discharge or bleeding. Used vaginal dilator with no pain. Will provide silicone dilator to try. Reports some clitoral irritation. \par \par 01/28/23: PET/CT: Sacroiliac findings suggestive of postradiation insufficiency fractures. Foci in the precarinal and bilateral hilar regions in the chest are nonspecific and may be reactive. Attention to followup. Otherwise response to therapy observed at vulva and previously seen inguinal lymph nodes, with essentially resolved activity and anatomic regression. \par \par 03/25/2023- MRI- pelvis- concerning pelvic fracture. FLAKITO\par \par Today she is here for a routine follow up. Saw Dr. Bustillos in May and is s/p vulva biopsy. Results pending. Recently suffered a fall while walking. Denies LOC. Was lifting something heavy recently and reports pulling pain to the left groin since. She took Flexeril, Mobic with minimal relief. No urinary or bowel complaints. She notices some vaginal spotting from an unhealed area from the vulva.. Follows with Dr. Mesa for fracture\par  PET /CT  noted new left pubic ramus suspected lesion\par \par

## 2023-07-18 ENCOUNTER — APPOINTMENT (OUTPATIENT)
Dept: GYNECOLOGIC ONCOLOGY | Facility: CLINIC | Age: 63
End: 2023-07-18
Payer: COMMERCIAL

## 2023-07-18 VITALS
WEIGHT: 132 LBS | HEIGHT: 60 IN | HEART RATE: 89 BPM | SYSTOLIC BLOOD PRESSURE: 177 MMHG | BODY MASS INDEX: 25.91 KG/M2 | DIASTOLIC BLOOD PRESSURE: 81 MMHG

## 2023-07-18 DIAGNOSIS — C51.9 MALIGNANT NEOPLASM OF VULVA, UNSPECIFIED: ICD-10-CM

## 2023-07-18 LAB — CORE LAB BIOPSY: NORMAL

## 2023-07-18 PROCEDURE — 99215 OFFICE O/P EST HI 40 MIN: CPT | Mod: 25

## 2023-07-18 PROCEDURE — 56820 COLPOSCOPY VULVA: CPT

## 2023-07-19 PROBLEM — C51.9 VULVAR CANCER: Noted: 2022-06-23

## 2023-07-19 NOTE — PHYSICAL EXAM
[Chaperone Present] : A chaperone was present in the examining room during all aspects of the physical examination [Abnormal] : External genitalia: Abnormal [Normal] : Anus and perineum: Normal sphincter tone, no masses, no prolapse. [de-identified] : no adenopathy [de-identified] : the tumor appears to have completely resolved, there is about 1cm of induration along the right periclitoral area, no gross tumor [de-identified] : adnexa nonpalpable

## 2023-07-19 NOTE — PROCEDURE
"PRIMARY DIAGNOSIS: GENERALIZED WEAKNESS/MULTIPLE FALLS     OUTPATIENT/OBSERVATION GOALS TO BE MET BEFORE DISCHARGE  1. Orthostatic performed: N/A    2. Tolerating PO medications: Yes    3. Return to near baseline physical activity: Yes    4. Cleared for discharge by consultants (if involved): No    Discharge Planner Nurse   Safe discharge environment identified: No  Barriers to discharge: Yes       Entered by: Isabela Masters 10/14/2022 11:13 AM     Please review provider order for any additional goals.   Nurse to notify provider when observation goals have been met and patient is ready for discharge.  /46 (BP Location: Right arm)   Pulse 80   Temp 100  F (37.8  C) (Temporal)   Resp 18   Ht 1.575 m (5' 2.01\")   Wt 86.5 kg (190 lb 11.2 oz)   LMP  (LMP Unknown)   SpO2 96%   BMI 34.87 kg/m      UA collected and pending results. Voiding. A1 g/w, no reports of lightheadedness or dizziness during transfers. PT following. Straight cath orders obtained PRN.    " [Colposcopy] : colposcopy [Patient] : the patient [Verbal Consent] : verbal consent was obtained prior to the procedure and is detailed in the patient's record [Site Verification] : site verification performed. [Time Out] : Time Out Procedure:The following was confirmed prior to the procedure-Correct patient identity, correct site, agreement on the procedure to be done and correct patient position. [Lesion] : a lesion was seen [No Complications] : none [Tolerated Well] : the patient tolerated the procedure well [Post procedure instructions and information given] : post procedure instructions and information given and reviewed with patient. [1] : 1 [FreeTextEntry9] : vulvar cancer

## 2023-07-19 NOTE — LETTER BODY
[Dear  ___] : Dear  [unfilled], [I recently saw our patient [unfilled] for a follow-up visit.] : I recently saw our patient, [unfilled] for a follow-up visit. [Attached please find my note.] : Attached please find my note. [FreeTextEntry2] : Surgery is planned for resection of recurrent disease on the vulva.  Thank you again for referring this tyree patient.\par \par

## 2023-07-19 NOTE — PROCEDURE
[Colposcopy] : colposcopy [Patient] : the patient [Verbal Consent] : verbal consent was obtained prior to the procedure and is detailed in the patient's record [Site Verification] : site verification performed. [Time Out] : Time Out Procedure:The following was confirmed prior to the procedure-Correct patient identity, correct site, agreement on the procedure to be done and correct patient position. [Lesion] : a lesion was seen [No Complications] : none [Silver Nitrate] : silver nitrate [Tolerated Well] : the patient tolerated the procedure well [Post procedure instructions and information given] : post procedure instructions and information given and reviewed with patient. [1] : 1 [FreeTextEntry9] : vulvar cancer

## 2023-07-19 NOTE — HISTORY OF PRESENT ILLNESS
[FreeTextEntry1] : Ms. RASHAUN MATT initially presented for evaluation of TATYANA 3 in 10/2020 and did not return for followup until 6/2022 when she had developed invasive vulvar SCC..\par Biopsy in 9/2020 showed TATYANA 3 extending to the tissue edges. \par She had an episode of vaginal spotting in 2019; in retrospect may have been from vulva .  Endometrial biopsy was reportedly postponed secondary to pandemic.  Pelvic US in 2019 showed an endometrial lining of 3-4 mm. 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 Vagina is distended by gel which was administered for this exam. LYMPH NODES: Three adjacent abnormal right inguinal lymph nodes measuring up to 1.8 x 1.3 cm\par 6/30/22: PET/CT - IMPRESSION: Abnormal FDG-PET/CT scan.\par 1. FDG avidity in bilateral vulva compatible with newly diagnosed malignancy, with extension of FDG avidity into the vaginal region concerning for disease involvement.\par 2. FDG avid right inguinal lymph nodes compatible with metastases. Mildly FDG avid single left inguinal lymph node concerning for additional metastatic disease; this may be further evaluated with ultrasound and possible FNA biopsy as indicated.\par 3. Nonspecific mildly asymmetric FDG activity in the right base of tongue. Correlate clinically or with direct visualization.\par \par She initiated chemoradiation with Dr. Carreon and Dr. Sanders. \par She received a total dose of 6400 cGy to the pelvis/vulva/inguinal lymph node with a cone down. Post treatment MRI 11/23/22: complete vs near complete treatment response; the vulvar mass measuring 2.4 x 0.8 cm markedly decrease in size and predominantly fibrotic, with areas equivocal for small residual disease. Interval decrease in right inguinal adenopathy now 1.0 x 0.6 cm previously 1.8 x 1.3 cm.\par \par Due to pelvic pain, a pelvic MRI was ordered\par 3/2023- Bony Pelvis MRI- Acute bilateral sacral alar insufficiency fractures with extension of fracture lines into the S1 and S2 vertebral bodies.\par Extensive edema along the iliac aspect of the sacroiliac joints bilaterally, which may be related to secondary degenerative changes of the sacroiliac joints. However, underlying insufficiency fractures can not be excluded.\par Incompletely evaluated edema with T1 marrow placement within the LEFT pubic body, which may represent an additional fracture. However, an underlying pathologic marrow replacing lesion within the LEFT pubic body, such as metastatic disease, cannot be excluded and is also within the differential. Consider short-term interval follow-up MRI of the bony pelvis with full field of view for further evaluation.\par Questionable acute nondisplaced fracture of the RIGHT pubic body, which is incompletely evaluated on this exam, as it is only seen on the sagittal images.\par Edema within the transverse processes of L5 bilaterally, which may be related to nondisplaced fractures or be related to enthesopathic changes.\par \par She saw Ortho , Dr. Mesa- Patient has insufficiency fractures in her sacrum which are not neurologically compromising. This is most likely because of her radiation to her vulva. Recommended physical therapy and/or see physiatry and follow-up again with x-rays in 4 months or she can follow-up with neurosurgery as well with regard to these lesions.\par \par She had some mild residual tenderness in the periclitoral area, and occasional spotting.\par 5/25/23- Lesion seen on routine followup exam - Right vulva and Left vulva biopsy- dVIN.\par \par 5/27/23- PET/CT- 1.  Recently described suspected lesion in the LEFT pubic ramus is new since the prior PET/CT and demonstrates associated uptake. Metastatic disease is not excluded.\par 2.  Sacroiliac findings again consistent with insufficiency fractures. A fracture line is more readily apparent on the RIGHT compared to the prior exam. However, uptake associated with these fractures is now reduced.\par 3.  Foci in the precarinal and BILATERAL hilar regions in the chest which were previously seen have now resolved.\par 4.  Uptake at the perineum is likely contamination.. Otherwise, no new suspicious uptake at vulva or inguinal lymph nodes.\par \par She has ongoing discomfort related to the pelvic fractures. She notices some "bumpiness" at the prior biopsy sites but denies any irritation, bleeding or any other associated signs and symptoms.\par \par Health Maintenance:\par Mammo- 8/2022 BIRADS2\par Colonoscopy- 2015, normal (first colonoscopy had a tubular adenoma)\par \par Referring MD- Dr. Renetta Thompson (hasn’t seen since 2020)\par PCP- Dr. Ron Coburn\par GI- Dr. Stevie Wang (retired)\par - Dr. Goldberg

## 2023-07-19 NOTE — LETTER BODY
[Dear  ___] : Dear  [unfilled], [I recently saw our patient [unfilled] for a follow-up visit.] : I recently saw our patient, [unfilled] for a follow-up visit. [Attached please find my note.] : Attached please find my note. [FreeTextEntry2] : She underwent biopsies of the right and left vulva which demonstrated dVIN. She will return for followup discussion. Thank you again for referring this tyree patient.\par \par  [FreeTextEntry1] : biopsies

## 2023-07-19 NOTE — PHYSICAL EXAM
[Chaperone Present] : A chaperone was present in the examining room during all aspects of the physical examination [Abnormal] : External genitalia: Abnormal [Normal] : Anus and perineum: Normal sphincter tone, no masses, no prolapse. [de-identified] : no adenopathy [de-identified] : there is a <1cm erythematous desquamated lesion on the left labia minora just posterior to the clitoral boone. There is a 2cm area of raised white epithelium with mild ulceration along the left labia linora extending from the clitoral boone posteriorly; >1cm from urethral meatus. these were previously biopsied. no other lesions.  [de-identified] : adnexa nonpalpable

## 2023-07-19 NOTE — HISTORY OF PRESENT ILLNESS
[FreeTextEntry1] : Ms. RASHAUN MATT initially presented for evaluation of TATYANA 3 in 10/2020 and did not return for followup until 6/2022 when she had developed invasive vulvar SCC..\par Biopsy in 9/2020 showed TATYANA 3 extending to the tissue edges. \par She had an episode of vaginal spotting in 2019; in retrospect may have been from vulva .  Endometrial biopsy was reportedly postponed secondary to pandemic.  Pelvic US in 2019 showed an endometrial lining of 3-4 mm. 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 Vagina is distended by gel which was administered for this exam. LYMPH NODES: Three adjacent abnormal right inguinal lymph nodes measuring up to 1.8 x 1.3 cm\par 6/30/22: PET/CT - IMPRESSION: Abnormal FDG-PET/CT scan.\par 1. FDG avidity in bilateral vulva compatible with newly diagnosed malignancy, with extension of FDG avidity into the vaginal region concerning for disease involvement.\par 2. FDG avid right inguinal lymph nodes compatible with metastases. Mildly FDG avid single left inguinal lymph node concerning for additional metastatic disease; this may be further evaluated with ultrasound and possible FNA biopsy as indicated.\par 3. Nonspecific mildly asymmetric FDG activity in the right base of tongue. Correlate clinically or with direct visualization.\par \par She initiated chemoradiation with Dr. Carreon and Dr. Sanders. \par She received a total dose of 6400 cGy to the pelvis/vulva/inguinal lymph node with a cone down. Post treatment MRI 11/23/22: complete vs near complete treatment response; the vulvar mass measuring 2.4 x 0.8 cm markedly decrease in size and predominantly fibrotic, with areas equivocal for small residual disease. Interval decrease in right inguinal adenopathy now 1.0 x 0.6 cm previously 1.8 x 1.3 cm.\par \par D/t pelvic pain a Pelvic MRI was ordered\par \par 3/2023- Bony Pelvis MRI- Acute bilateral sacral alar insufficiency fractures with extension of fracture lines into the S1 and S2 vertebral bodies.\par \par Extensive edema along the iliac aspect of the sacroiliac joints bilaterally, which may be related to secondary degenerative changes of the sacroiliac joints. However, underlying insufficiency fractures can not be excluded.\par \par Incompletely evaluated edema with T1 marrow placement within the LEFT pubic body, which may represent an additional fracture. However, an underlying pathologic marrow replacing lesion within the LEFT pubic body, such as metastatic disease, cannot be excluded and is also within the differential. Consider short-term interval follow-up MRI of the yuly pelvis with full field of view for further evaluation.\par \par Questionable acute nondisplaced fracture of the RIGHT pubic body, which is incompletely evaluated on this exam, as it is only seen on the sagittal images.\par \par Edema within the transverse processes of L5 bilaterally, which may be related to nondisplaced fractures or be related to enthesopathic changes.\par \par She saw Ortho Dr. Mesa- Patient has insufficiency fractures in her sacrum which are not neurologically compromising. This is most likely because of her radiation to her vulva. This has seemed to cure her overall problems but probably is in the area causing in insufficiency fractures. My recommendation is to do some physical therapy and/or see physiatry. I think they may be beneficial. I do not think she needs any stabilization. I will follow-up again with x-rays in 4 months or she can follow-up with neurosurgery as well with regard to these lesions.\par \par She returns today for followup exam. \par She has some mild residual tenderness in the periclitoral area, occasional spotting,  but denies any bleeding or any other associated signs and symptoms.\par \par Health Maintenance:\par Mammo- 8/2022 BIRADS2\par Colonoscopy- 2015, normal (first colonoscopy had a tubular adenoma)\par \par Referring MD- Dr. Renetta Thompson (hasn’t seen since 2020)\par PCP- Dr. Ron Coburn\par GI- Dr. Stevie Wang (retired)\par - Dr. Goldberg

## 2023-07-31 ENCOUNTER — OUTPATIENT (OUTPATIENT)
Dept: OUTPATIENT SERVICES | Facility: HOSPITAL | Age: 63
LOS: 1 days | End: 2023-07-31
Payer: COMMERCIAL

## 2023-07-31 VITALS
TEMPERATURE: 98 F | OXYGEN SATURATION: 98 % | DIASTOLIC BLOOD PRESSURE: 70 MMHG | WEIGHT: 132.06 LBS | HEIGHT: 61 IN | HEART RATE: 76 BPM | SYSTOLIC BLOOD PRESSURE: 160 MMHG | RESPIRATION RATE: 16 BRPM

## 2023-07-31 DIAGNOSIS — D07.1 CARCINOMA IN SITU OF VULVA: ICD-10-CM

## 2023-07-31 DIAGNOSIS — Z92.3 PERSONAL HISTORY OF IRRADIATION: Chronic | ICD-10-CM

## 2023-07-31 DIAGNOSIS — I10 ESSENTIAL (PRIMARY) HYPERTENSION: ICD-10-CM

## 2023-07-31 DIAGNOSIS — Z98.890 OTHER SPECIFIED POSTPROCEDURAL STATES: Chronic | ICD-10-CM

## 2023-07-31 DIAGNOSIS — Z92.21 PERSONAL HISTORY OF ANTINEOPLASTIC CHEMOTHERAPY: Chronic | ICD-10-CM

## 2023-07-31 LAB
ALBUMIN SERPL ELPH-MCNC: 4.1 G/DL — SIGNIFICANT CHANGE UP (ref 3.3–5)
ALP SERPL-CCNC: 76 U/L — SIGNIFICANT CHANGE UP (ref 40–120)
ALT FLD-CCNC: 12 U/L — SIGNIFICANT CHANGE UP (ref 4–33)
ANION GAP SERPL CALC-SCNC: 10 MMOL/L — SIGNIFICANT CHANGE UP (ref 7–14)
AST SERPL-CCNC: 17 U/L — SIGNIFICANT CHANGE UP (ref 4–32)
BILIRUB SERPL-MCNC: 0.2 MG/DL — SIGNIFICANT CHANGE UP (ref 0.2–1.2)
BLD GP AB SCN SERPL QL: NEGATIVE — SIGNIFICANT CHANGE UP
BUN SERPL-MCNC: 25 MG/DL — HIGH (ref 7–23)
CALCIUM SERPL-MCNC: 9.4 MG/DL — SIGNIFICANT CHANGE UP (ref 8.4–10.5)
CHLORIDE SERPL-SCNC: 102 MMOL/L — SIGNIFICANT CHANGE UP (ref 98–107)
CO2 SERPL-SCNC: 26 MMOL/L — SIGNIFICANT CHANGE UP (ref 22–31)
CREAT SERPL-MCNC: 0.96 MG/DL — SIGNIFICANT CHANGE UP (ref 0.5–1.3)
EGFR: 67 ML/MIN/1.73M2 — SIGNIFICANT CHANGE UP
GLUCOSE SERPL-MCNC: 86 MG/DL — SIGNIFICANT CHANGE UP (ref 70–99)
HCT VFR BLD CALC: 33.3 % — LOW (ref 34.5–45)
HGB BLD-MCNC: 10.8 G/DL — LOW (ref 11.5–15.5)
MCHC RBC-ENTMCNC: 32.4 GM/DL — SIGNIFICANT CHANGE UP (ref 32–36)
MCHC RBC-ENTMCNC: 32.4 PG — SIGNIFICANT CHANGE UP (ref 27–34)
MCV RBC AUTO: 100 FL — SIGNIFICANT CHANGE UP (ref 80–100)
NRBC # BLD: 0 /100 WBCS — SIGNIFICANT CHANGE UP (ref 0–0)
NRBC # FLD: 0 K/UL — SIGNIFICANT CHANGE UP (ref 0–0)
PLATELET # BLD AUTO: 215 K/UL — SIGNIFICANT CHANGE UP (ref 150–400)
POTASSIUM SERPL-MCNC: 4.2 MMOL/L — SIGNIFICANT CHANGE UP (ref 3.5–5.3)
POTASSIUM SERPL-SCNC: 4.2 MMOL/L — SIGNIFICANT CHANGE UP (ref 3.5–5.3)
PROT SERPL-MCNC: 7.2 G/DL — SIGNIFICANT CHANGE UP (ref 6–8.3)
RBC # BLD: 3.33 M/UL — LOW (ref 3.8–5.2)
RBC # FLD: 12.7 % — SIGNIFICANT CHANGE UP (ref 10.3–14.5)
RH IG SCN BLD-IMP: POSITIVE — SIGNIFICANT CHANGE UP
SODIUM SERPL-SCNC: 138 MMOL/L — SIGNIFICANT CHANGE UP (ref 135–145)
WBC # BLD: 5.38 K/UL — SIGNIFICANT CHANGE UP (ref 3.8–10.5)
WBC # FLD AUTO: 5.38 K/UL — SIGNIFICANT CHANGE UP (ref 3.8–10.5)

## 2023-07-31 PROCEDURE — 93010 ELECTROCARDIOGRAM REPORT: CPT

## 2023-07-31 RX ORDER — SODIUM CHLORIDE 9 MG/ML
1000 INJECTION, SOLUTION INTRAVENOUS
Refills: 0 | Status: DISCONTINUED | OUTPATIENT
Start: 2023-08-11 | End: 2023-08-25

## 2023-07-31 RX ORDER — SODIUM CHLORIDE 9 MG/ML
3 INJECTION INTRAMUSCULAR; INTRAVENOUS; SUBCUTANEOUS EVERY 8 HOURS
Refills: 0 | Status: DISCONTINUED | OUTPATIENT
Start: 2023-08-11 | End: 2023-08-25

## 2023-07-31 NOTE — H&P PST ADULT - NS PRO PASSIVE SMOKE EXP
Pre-op Diagnosis: S/p left hip fracture [Z87.81]    The above referenced H&P was reviewed by Johanna Roa MD on 11/27/2022, the patient was examined and no significant changes have occurred in the patient's condition since the H&P was performed. I discussed with the patient and/or legal representative the potential benefits, risks and side effects of this procedure; the likelihood of the patient achieving goals; and potential problems that might occur during recuperation. I discussed reasonable alternatives to the procedure, including risks, benefits and side effects related to the alternatives and risks related to not receiving this procedure. We will proceed with procedure as planned.
No

## 2023-07-31 NOTE — H&P PST ADULT - NSICDXPASTSURGICALHX_GEN_ALL_CORE_FT
PAST SURGICAL HISTORY:  History of cancer chemotherapy     History of D&C     S/P biopsy     S/P radiation therapy

## 2023-07-31 NOTE — H&P PST ADULT - PROBLEM SELECTOR PLAN 2
medical evaluation requested for elevated BP (160/70).   pt will take blood pressure meds Metoprolol AM of surgery as prescribed

## 2023-07-31 NOTE — H&P PST ADULT - NSICDXPASTMEDICALHX_GEN_ALL_CORE_FT
PAST MEDICAL HISTORY:  Acid reflux     Arthritis     Carcinoma in situ of vulva     Celiac disease     Fracture of lumbar spine     Hiatal hernia     HTN (hypertension)     Osteopenia     Vulvar cancer     Vulvar intraepithelial neoplasia (TATYANA) grade 3

## 2023-07-31 NOTE — H&P PST ADULT - RESPIRATORY
clear to auscultation bilaterally/no wheezes/no respiratory distress/no use of accessory muscles/breath sounds equal/respirations non-labored

## 2023-07-31 NOTE — H&P PST ADULT - NEGATIVE ENMT SYMPTOMS
no hearing difficulty/no ear pain/no tinnitus/no nose bleeds/no gum bleeding/no dry mouth/no throat pain/no dysphagia

## 2023-07-31 NOTE — H&P PST ADULT - NSANTHOSAYNRD_GEN_A_CORE
No. JACKSON screening performed.  STOP BANG Legend: 0-2 = LOW Risk; 3-4 = INTERMEDIATE Risk; 5-8 = HIGH Risk

## 2023-07-31 NOTE — H&P PST ADULT - PROBLEM SELECTOR PLAN 1
preop for partial vulvectomy on 8/11/23  preop instructions given, pt verbalized understanding  GI prophylaxis provided  cbc, cmp, type pending

## 2023-07-31 NOTE — H&P PST ADULT - HISTORY OF PRESENT ILLNESS
61 y/o female with carcinoma in situ of vulva presents to PST preop for partial vulvectomy. Pt s/p biopsy in June 2022 for vulvar mass. Pathology confirmed malignancy. Pt s/p RT and chemotherapy, completed 10/2022.

## 2023-07-31 NOTE — H&P PST ADULT - NSICDXFAMILYHX_GEN_ALL_CORE_FT
FAMILY HISTORY:  Father  Still living? Unknown  FH: heart disease, Age at diagnosis: Age Unknown    Mother  Still living? Unknown  FH: arrhythmia, Age at diagnosis: Age Unknown

## 2023-08-07 ENCOUNTER — APPOINTMENT (OUTPATIENT)
Dept: MAMMOGRAPHY | Facility: CLINIC | Age: 63
End: 2023-08-07
Payer: COMMERCIAL

## 2023-08-07 PROCEDURE — 77063 BREAST TOMOSYNTHESIS BI: CPT

## 2023-08-07 PROCEDURE — 77067 SCR MAMMO BI INCL CAD: CPT

## 2023-08-10 ENCOUNTER — TRANSCRIPTION ENCOUNTER (OUTPATIENT)
Age: 63
End: 2023-08-10

## 2023-08-11 ENCOUNTER — APPOINTMENT (OUTPATIENT)
Dept: GYNECOLOGIC ONCOLOGY | Facility: HOSPITAL | Age: 63
End: 2023-08-11

## 2023-08-11 ENCOUNTER — OUTPATIENT (OUTPATIENT)
Dept: OUTPATIENT SERVICES | Facility: HOSPITAL | Age: 63
LOS: 1 days | Discharge: HOME | End: 2023-08-11
Payer: COMMERCIAL

## 2023-08-11 ENCOUNTER — TRANSCRIPTION ENCOUNTER (OUTPATIENT)
Age: 63
End: 2023-08-11

## 2023-08-11 ENCOUNTER — RESULT REVIEW (OUTPATIENT)
Age: 63
End: 2023-08-11

## 2023-08-11 VITALS
SYSTOLIC BLOOD PRESSURE: 155 MMHG | HEART RATE: 72 BPM | DIASTOLIC BLOOD PRESSURE: 74 MMHG | OXYGEN SATURATION: 100 % | RESPIRATION RATE: 16 BRPM | WEIGHT: 132.06 LBS | HEIGHT: 61 IN | TEMPERATURE: 98 F

## 2023-08-11 VITALS
RESPIRATION RATE: 17 BRPM | DIASTOLIC BLOOD PRESSURE: 51 MMHG | HEART RATE: 63 BPM | OXYGEN SATURATION: 100 % | SYSTOLIC BLOOD PRESSURE: 129 MMHG

## 2023-08-11 DIAGNOSIS — Z98.890 OTHER SPECIFIED POSTPROCEDURAL STATES: Chronic | ICD-10-CM

## 2023-08-11 DIAGNOSIS — Z92.3 PERSONAL HISTORY OF IRRADIATION: Chronic | ICD-10-CM

## 2023-08-11 DIAGNOSIS — D07.1 CARCINOMA IN SITU OF VULVA: ICD-10-CM

## 2023-08-11 DIAGNOSIS — Z92.21 PERSONAL HISTORY OF ANTINEOPLASTIC CHEMOTHERAPY: Chronic | ICD-10-CM

## 2023-08-11 PROBLEM — M85.80 OTHER SPECIFIED DISORDERS OF BONE DENSITY AND STRUCTURE, UNSPECIFIED SITE: Chronic | Status: ACTIVE | Noted: 2023-07-31

## 2023-08-11 PROBLEM — C51.9 MALIGNANT NEOPLASM OF VULVA, UNSPECIFIED: Chronic | Status: ACTIVE | Noted: 2023-07-31

## 2023-08-11 PROBLEM — M19.90 UNSPECIFIED OSTEOARTHRITIS, UNSPECIFIED SITE: Chronic | Status: ACTIVE | Noted: 2023-07-31

## 2023-08-11 PROBLEM — K44.9 DIAPHRAGMATIC HERNIA WITHOUT OBSTRUCTION OR GANGRENE: Chronic | Status: ACTIVE | Noted: 2023-07-31

## 2023-08-11 PROBLEM — K21.9 GASTRO-ESOPHAGEAL REFLUX DISEASE WITHOUT ESOPHAGITIS: Chronic | Status: ACTIVE | Noted: 2023-07-31

## 2023-08-11 LAB — RH IG SCN BLD-IMP: POSITIVE — SIGNIFICANT CHANGE UP

## 2023-08-11 PROCEDURE — 88342 IMHCHEM/IMCYTCHM 1ST ANTB: CPT | Mod: 26

## 2023-08-11 PROCEDURE — 88305 TISSUE EXAM BY PATHOLOGIST: CPT | Mod: 26

## 2023-08-11 PROCEDURE — 56620 VULVECTOMY SIMPLE PARTIAL: CPT

## 2023-08-11 PROCEDURE — 88309 TISSUE EXAM BY PATHOLOGIST: CPT | Mod: 26

## 2023-08-11 PROCEDURE — 88341 IMHCHEM/IMCYTCHM EA ADD ANTB: CPT | Mod: 26

## 2023-08-11 RX ORDER — SODIUM CHLORIDE 9 MG/ML
1000 INJECTION, SOLUTION INTRAVENOUS
Refills: 0 | Status: DISCONTINUED | OUTPATIENT
Start: 2023-08-11 | End: 2023-08-25

## 2023-08-11 RX ORDER — IBUPROFEN 200 MG
1 TABLET ORAL
Refills: 0 | DISCHARGE

## 2023-08-11 RX ORDER — OXYCODONE HYDROCHLORIDE 5 MG/1
1 TABLET ORAL
Qty: 10 | Refills: 0
Start: 2023-08-11

## 2023-08-11 RX ORDER — ACETAMINOPHEN 500 MG
1000 TABLET ORAL ONCE
Refills: 0 | Status: DISCONTINUED | OUTPATIENT
Start: 2023-08-11 | End: 2023-08-25

## 2023-08-11 RX ORDER — HYDROMORPHONE HYDROCHLORIDE 2 MG/ML
0.5 INJECTION INTRAMUSCULAR; INTRAVENOUS; SUBCUTANEOUS
Refills: 0 | Status: DISCONTINUED | OUTPATIENT
Start: 2023-08-11 | End: 2023-08-11

## 2023-08-11 RX ADMIN — HYDROMORPHONE HYDROCHLORIDE 0.5 MILLIGRAM(S): 2 INJECTION INTRAMUSCULAR; INTRAVENOUS; SUBCUTANEOUS at 11:30

## 2023-08-11 RX ADMIN — HYDROMORPHONE HYDROCHLORIDE 0.5 MILLIGRAM(S): 2 INJECTION INTRAMUSCULAR; INTRAVENOUS; SUBCUTANEOUS at 11:08

## 2023-08-11 NOTE — CHART NOTE - NSCHARTNOTEFT_GEN_A_CORE
R2 Post-Op Check    Patient seen and examined at bedside, recently post-op. No acute complaints at this time. Voiding spontaneously. Tolerating sips of water. Ambulating without dizziness. Denies CP, SOB, N/V, fevers, and chills.    Vital Signs Last 24 Hours  T(C): 36.7 (08-11-23 @ 12:10), Max: 36.7 (08-11-23 @ 05:40)  HR: 63 (08-11-23 @ 12:45) (56 - 72)  BP: 129/51 (08-11-23 @ 12:45) (129/51 - 177/83)  RR: 17 (08-11-23 @ 12:45) (12 - 19)  SpO2: 100% (08-11-23 @ 12:45) (94% - 100%)    I&O's Summary    11 Aug 2023 07:01  -  11 Aug 2023 13:19  --------------------------------------------------------  IN: 575 mL / OUT: 400 mL / NET: 175 mL      Physical Exam:  General: NAD  CV: NR, RR, S1, S2, no M/R/G  Lungs: CTA-B  Abdomen: Soft, non-tender, non-distended  Incision: vulvar incisions CDI, no active bleeding noted  : light spotting on pad  Ext: No pain or swelling    Labs:    MEDICATIONS  (STANDING):  acetaminophen   IVPB .. 1000 milliGRAM(s) IV Intermittent once  lactated ringers. 1000 milliLiter(s) (125 mL/Hr) IV Continuous <Continuous>  lactated ringers. 1000 milliLiter(s) (125 mL/Hr) IV Continuous <Continuous>  sodium chloride 0.9% lock flush 3 milliLiter(s) IV Push every 8 hours    MEDICATIONS  (PRN):  HYDROmorphone  Injectable 0.5 milliGRAM(s) IV Push every 10 minutes PRN Moderate Pain (4 - 6)      61yo s/p simple partial vulvectomy recovering well in acute post-operative state.    Neuro: Pain controlled. PO pain meds  CV: Hemodynamically stable  Pulm: Encourage oob/ambulation, incentive spirometer at bedside  GI: Regular Diet  : Voiding spontaneously, 400cc since surgery  Heme: SCDs and early ambulation for DVT PPX  ID: afebrile  Endo: no active issues  Dispo: continue routine post-op care     D/w GYN Onc Team  Eva PGY2

## 2023-08-11 NOTE — ASU DISCHARGE PLAN (ADULT/PEDIATRIC) - ASU DC SPECIAL INSTRUCTIONSFT
Postoperative Instructions      Infection prevention: You were given antibiotics while in the operating room for infection prevention. Please apply a pea-sized amount of topical Bacitracin (available over the counter) as well as soap and water using your hand to the vulva/vagina two times a day. Pat dry, do not scrub.    Pain control: For pain control, take the following   1. Motrin 600mg four times a day, take with food  2. Add Tylenol 975 four times a day, alternated with motrin  3. Add oxycodone as needed for severe pain not managed well by motrin and tylenol. A prescription has been sent to your pharmacy on file.   Motrin and Tylenol can be obtained over the counter.    Postoperative restrictions: Do not drive or make important decisions for 24 hours after anesthesia. You may feel drowsy for 24 hours and should have a responsible adult with you during that time. Nothing in the vagina (tampons, sexual intercourse), No tub baths, pools or hot tubs for 8 weeks (showers are ok!). No lifting anything heavier than 15 lbs, no strenuous exercise for 8 weeks after surgery. Do not pull or cut any stitches that you see around your incision.    Vaginal bleeding: Spotting and intermittent passage of blood clots per vagina is normal in first few weeks after surgery. If you are soaking 1 pad per hour, that is not normal and you should notify Dr. Jane' office and seek medical attention right away.     Vaginal discharge: Vaginal discharge (all colors) is normal after vaginal surgery. If you’ve had vaginal surgery, you have sutures in your vagina which take 3 months to fully absorb. You may have vaginal discharge during this time. This is normal.    Signs of Infection: Some postoperative pain and discomfort is normal. However, if you experience any of the following, you may be developing an infection and should be seen by your doctor: pain that does not get better with the oral medications listed above, fever greater than 100.4F, foul smelling discharge coming from the surgical site, nausea and vomiting that does not stop (especially if you are unable to tolerate oral intake), or inability to urinate. If you experience any of these symptoms, call your doctor's office to be seen right away. Postoperative Instructions      Infection prevention: You were given antibiotics while in the operating room for infection prevention. Please apply a pea-sized amount of topical Bacitracin (available over the counter) as well as soap and water using your hand to the vulva/vagina two times a day. Pat dry, do not scrub.    Pain control: For pain control, take the following   1. Motrin 600mg four times a day, take with food  2. Add Tylenol 975 four times a day, alternated with Motrin  3. Add oxycodone as needed for severe pain not managed well by Motrin and Tylenol. A prescription has been sent to your pharmacy on file.   Motrin and Tylenol can be obtained over the counter.    Postoperative restrictions: Do not drive or make important decisions for 24 hours after anesthesia. You may feel drowsy for 24 hours and should have a responsible adult with you during that time. Nothing in the vagina (tampons, sexual intercourse), No tub baths, pools or hot tubs for 8 weeks (showers are ok!). No lifting anything heavier than 15 lbs, no strenuous exercise for 8 weeks after surgery. Do not pull or cut any stitches that you see around your incision.    Vaginal bleeding: Spotting and intermittent passage of blood clots per vagina is normal in first few weeks after surgery. If you are soaking 1 pad per hour, that is not normal and you should notify Dr. Jane' office and seek medical attention right away.     Vaginal discharge: Vaginal discharge (all colors) is normal after vaginal surgery. If you’ve had vaginal surgery, you have sutures in your vagina which take 3 months to fully absorb. You may have vaginal discharge during this time. This is normal.    Signs of Infection: Some postoperative pain and discomfort is normal. However, if you experience any of the following, you may be developing an infection and should be seen by your doctor: pain that does not get better with the oral medications listed above, fever greater than 100.4F, foul smelling discharge coming from the surgical site, nausea and vomiting that does not stop (especially if you are unable to tolerate oral intake), or inability to urinate. If you experience any of these symptoms, call your doctor's office to be seen right away.

## 2023-08-11 NOTE — ASU DISCHARGE PLAN (ADULT/PEDIATRIC) - MEDICATION INSTRUCTIONS
Tylenol 1000mg every 6 hours and Ibuprofen 600mg every 6h (alternate each every 3h for best coverage). Oxycodone 5mg every 6h for breakthrough pain.

## 2023-08-11 NOTE — BRIEF OPERATIVE NOTE - SPECIMENS
R periclitoral lesion, L labia minora and distal clitoris lesion R periclitoral lesion;  L labia minora and distal clitoris lesion

## 2023-08-11 NOTE — ASU DISCHARGE PLAN (ADULT/PEDIATRIC) - CARE PROVIDER_API CALL
Tiera Jane  Obstetrics and Gynecology  [address] [place] [address]  Phone: [phone]  Fax: [phone]  Scheduled Appointment: 10/31/2023 10:00 AM

## 2023-08-11 NOTE — BRIEF OPERATIVE NOTE - OPERATION/FINDINGS
EUA: diffuse vulvar erythema and thickened vulvar skin, superficial ulcerations of distal clitoris/L labia minora/R periclitoral regions.  Hutchins catheter placed intra-op with appropriate drainage noted. Topical bacitracin applied over vulva at end of procedure. Excellent hemostasis. EUA: raised and focally ulcerated epithelium of distal clitoris and boone/b/l labia minora/periclitoral regions.  Hutchins catheter placed intra-op with appropriate drainage noted. Topical bacitracin applied at end of procedure. Excellent hemostasis.

## 2023-08-11 NOTE — BRIEF OPERATIVE NOTE - NSICDXBRIEFPREOP_GEN_ALL_CORE_FT
PRE-OP DIAGNOSIS:  Vulvar cancer 11-Aug-2023 11:22:03  Alicia Cannon  Differentiated vulvar intraepithelial neoplasia (dVIN) 11-Aug-2023 11:22:07  Alicia Cannon

## 2023-08-11 NOTE — BRIEF OPERATIVE NOTE - NSICDXBRIEFPOSTOP_GEN_ALL_CORE_FT
POST-OP DIAGNOSIS:  Vulvar cancer 11-Aug-2023 11:22:14  Alicia Cannon  Differentiated vulvar intraepithelial neoplasia (dVIN) 11-Aug-2023 11:22:19  Alicia Cannon

## 2023-08-11 NOTE — ASU DISCHARGE PLAN (ADULT/PEDIATRIC) - NURSING INSTRUCTIONS
Do not take NSAIDS (Motrin, Advil, Ibuprofen) until after 4:00PM as you were given a dose in the operating room.

## 2023-08-11 NOTE — ASU DISCHARGE PLAN (ADULT/PEDIATRIC) - NS MD DC FALL RISK RISK
For information on Fall & Injury Prevention, visit: [email or web address] OR  [email or web address] OR  https://www.cdc.gov/steadi/patient.html

## 2023-08-15 ENCOUNTER — APPOINTMENT (OUTPATIENT)
Dept: GYNECOLOGIC ONCOLOGY | Facility: CLINIC | Age: 63
End: 2023-08-15
Payer: COMMERCIAL

## 2023-08-15 VITALS — DIASTOLIC BLOOD PRESSURE: 76 MMHG | SYSTOLIC BLOOD PRESSURE: 182 MMHG | HEART RATE: 68 BPM

## 2023-08-15 PROCEDURE — 99024 POSTOP FOLLOW-UP VISIT: CPT

## 2023-08-15 NOTE — LETTER BODY
[Dear  ___] : Dear  [unfilled], [I recently saw our patient [unfilled] for a follow-up visit.] : I recently saw our patient, [unfilled] for a follow-up visit. [Attached please find my note.] : Attached please find my note. [FreeTextEntry2] : She underwent partial simple vulvectomy and she is healing well. Pathology is pending. Thank you again for referring this tyree patient.

## 2023-08-15 NOTE — REASON FOR VISIT
[Post Op] : post op visit [de-identified] : 8/11/2023 [de-identified] : Partial simple vulvectomy , resection of distal clitoris [de-identified] : She reports that she has had a very smooth recovery with no issues thus far. She denies abnl vaginal bleeding, pelvic or abdominal pain or urinary or bowel complaints. No longer requiring pain medication. Returning to usual activities and maintaining pelvic rest. Performing pericare BID as instructed, and after using bathroom.

## 2023-08-15 NOTE — ASSESSMENT
[FreeTextEntry1] : 63y/o s/p resection of dVIN, prior vulvar RT for invasive SCC, healing well thus far. Pathology pending.   Plan: Final pathology results are pending.  Instructions were reviewed in detail . S/sx infection to call for were reviewed.  She was advised to return next week for another wound check and hopefully pathology discusison.  All questions were answered to her apparent satisfaction.

## 2023-08-15 NOTE — DISCUSSION/SUMMARY
[None] : had no drainage [Normal Skin] : normal appearance [Doing Well] : is doing well [Clean] : was clean [Dry] : was dry [Intact] : was intact [Erythema] : was not erythematous [Ecchymosis] : was not ecchymotic [Seroma] : had no seroma [External Genitalia Abnormal] : normal external genitalia [Excellent Pain Control] : has excellent pain control [No Sign of Infection] : is showing no signs of infection [1] : 1 [de-identified] : normal [de-identified] : maintain pelvic rest until fully healed [FreeTextEntry1] : pathology is pending.

## 2023-08-16 PROBLEM — S32.009A UNSPECIFIED FRACTURE OF UNSPECIFIED LUMBAR VERTEBRA, INITIAL ENCOUNTER FOR CLOSED FRACTURE: Chronic | Status: ACTIVE | Noted: 2023-07-31

## 2023-08-16 PROBLEM — I10 ESSENTIAL (PRIMARY) HYPERTENSION: Chronic | Status: ACTIVE | Noted: 2023-07-31

## 2023-08-16 PROBLEM — D07.1 CARCINOMA IN SITU OF VULVA: Chronic | Status: ACTIVE | Noted: 2023-07-31

## 2023-08-16 PROBLEM — K90.0 CELIAC DISEASE: Chronic | Status: ACTIVE | Noted: 2023-07-31

## 2023-08-22 ENCOUNTER — APPOINTMENT (OUTPATIENT)
Dept: GYNECOLOGIC ONCOLOGY | Facility: CLINIC | Age: 63
End: 2023-08-22
Payer: COMMERCIAL

## 2023-08-22 VITALS — HEART RATE: 79 BPM | SYSTOLIC BLOOD PRESSURE: 165 MMHG | DIASTOLIC BLOOD PRESSURE: 66 MMHG

## 2023-08-22 PROCEDURE — 99024 POSTOP FOLLOW-UP VISIT: CPT

## 2023-08-29 ENCOUNTER — APPOINTMENT (OUTPATIENT)
Dept: GYNECOLOGIC ONCOLOGY | Facility: CLINIC | Age: 63
End: 2023-08-29

## 2023-08-31 ENCOUNTER — APPOINTMENT (OUTPATIENT)
Dept: GYNECOLOGIC ONCOLOGY | Facility: CLINIC | Age: 63
End: 2023-08-31
Payer: COMMERCIAL

## 2023-08-31 VITALS
HEART RATE: 75 BPM | BODY MASS INDEX: 25.78 KG/M2 | WEIGHT: 132 LBS | DIASTOLIC BLOOD PRESSURE: 70 MMHG | SYSTOLIC BLOOD PRESSURE: 169 MMHG

## 2023-08-31 LAB — SURGICAL PATHOLOGY STUDY: SIGNIFICANT CHANGE UP

## 2023-08-31 PROCEDURE — 99024 POSTOP FOLLOW-UP VISIT: CPT

## 2023-08-31 NOTE — DISCUSSION/SUMMARY
[Clean] : was clean [Dry] : was dry [Intact] : was intact [Erythema] : was not erythematous [Ecchymosis] : was not ecchymotic [Seroma] : had no seroma [None] : had no drainage [Normal Skin] : normal appearance [External Genitalia Abnormal] : normal external genitalia [Doing Well] : is doing well [Excellent Pain Control] : has excellent pain control [No Sign of Infection] : is showing no signs of infection [1] : 1 [de-identified] : normal [de-identified] : maintain pelvic rest until fully healed [FreeTextEntry1] : pathology is still pending.

## 2023-08-31 NOTE — REASON FOR VISIT
[Post Op] : post op visit [de-identified] : 8/11/2023 [de-identified] : Partial simple vulvectomy , resection of distal clitoris [de-identified] : She reports that she has had a very smooth recovery with no issues thus far. She denies abnl vaginal bleeding, pelvic or abdominal pain or urinary or bowel complaints. No longer requiring pain medication. Returning to usual activities and maintaining pelvic rest. Performing pericare BID as instructed, and after using bathroom.

## 2023-08-31 NOTE — DISCUSSION/SUMMARY
[Clean] : was clean [Dry] : was dry [Intact] : was intact [None] : had no drainage [Normal Skin] : normal appearance [Doing Well] : is doing well [Excellent Pain Control] : has excellent pain control [No Sign of Infection] : is showing no signs of infection [1] : 1 [Erythema] : was not erythematous [Ecchymosis] : was not ecchymotic [Seroma] : had no seroma [External Genitalia Abnormal] : normal external genitalia [de-identified] : normal [de-identified] : maintain pelvic rest until fully healed [FreeTextEntry1] : pathology is pending.

## 2023-08-31 NOTE — REASON FOR VISIT
[Post Op] : post op visit [de-identified] : 8/11/2023 [de-identified] : Partial simple vulvectomy, resection of distal clitoris [de-identified] : She reports that she has had a very smooth recovery with no issues thus far. She denies abnl vaginal bleeding, pelvic or abdominal pain or urinary or bowel complaints. No longer requiring pain medication. Returning to usual activities and maintaining pelvic rest. Performing pericare BID as instructed, and after using bathroom.

## 2023-08-31 NOTE — ASSESSMENT
[FreeTextEntry1] : 61y/o s/p resection of dVIN, prior vulvar RT for invasive SCC, healing well thus far. Pathology pending.   Plan: Final pathology results are pending.  Instructions were reviewed in detail . S/sx infection to call for were reviewed.  She was advised to return next week for another wound check and hopefully pathology discusison.  All questions were answered to her apparent satisfaction.

## 2023-09-07 ENCOUNTER — APPOINTMENT (OUTPATIENT)
Dept: GYNECOLOGIC ONCOLOGY | Facility: CLINIC | Age: 63
End: 2023-09-07
Payer: COMMERCIAL

## 2023-09-07 VITALS
WEIGHT: 131.8 LBS | TEMPERATURE: 97.6 F | HEART RATE: 72 BPM | DIASTOLIC BLOOD PRESSURE: 70 MMHG | SYSTOLIC BLOOD PRESSURE: 130 MMHG | BODY MASS INDEX: 25.74 KG/M2

## 2023-09-07 PROCEDURE — 99024 POSTOP FOLLOW-UP VISIT: CPT

## 2023-09-09 ENCOUNTER — OUTPATIENT (OUTPATIENT)
Dept: OUTPATIENT SERVICES | Facility: HOSPITAL | Age: 63
LOS: 1 days | Discharge: HOME | End: 2023-09-09

## 2023-09-09 DIAGNOSIS — Z92.21 PERSONAL HISTORY OF ANTINEOPLASTIC CHEMOTHERAPY: Chronic | ICD-10-CM

## 2023-09-09 DIAGNOSIS — C51.9 MALIGNANT NEOPLASM OF VULVA, UNSPECIFIED: ICD-10-CM

## 2023-09-09 DIAGNOSIS — Z98.890 OTHER SPECIFIED POSTPROCEDURAL STATES: Chronic | ICD-10-CM

## 2023-09-09 DIAGNOSIS — Z92.3 PERSONAL HISTORY OF IRRADIATION: Chronic | ICD-10-CM

## 2023-09-12 ENCOUNTER — APPOINTMENT (OUTPATIENT)
Dept: HEMATOLOGY ONCOLOGY | Facility: CLINIC | Age: 63
End: 2023-09-12
Payer: COMMERCIAL

## 2023-09-12 VITALS
OXYGEN SATURATION: 98 % | BODY MASS INDEX: 26.32 KG/M2 | RESPIRATION RATE: 17 BRPM | HEIGHT: 60 IN | HEART RATE: 74 BPM | SYSTOLIC BLOOD PRESSURE: 166 MMHG | DIASTOLIC BLOOD PRESSURE: 78 MMHG | WEIGHT: 134.04 LBS | TEMPERATURE: 97.9 F

## 2023-09-12 PROCEDURE — 99214 OFFICE O/P EST MOD 30 MIN: CPT

## 2023-09-13 ENCOUNTER — NON-APPOINTMENT (OUTPATIENT)
Age: 63
End: 2023-09-13

## 2023-09-14 ENCOUNTER — APPOINTMENT (OUTPATIENT)
Dept: GYNECOLOGIC ONCOLOGY | Facility: CLINIC | Age: 63
End: 2023-09-14
Payer: COMMERCIAL

## 2023-09-14 VITALS
SYSTOLIC BLOOD PRESSURE: 178 MMHG | HEART RATE: 72 BPM | BODY MASS INDEX: 26.31 KG/M2 | HEIGHT: 60 IN | DIASTOLIC BLOOD PRESSURE: 78 MMHG | WEIGHT: 134 LBS | TEMPERATURE: 97.7 F

## 2023-09-14 PROCEDURE — 99024 POSTOP FOLLOW-UP VISIT: CPT

## 2023-09-21 ENCOUNTER — APPOINTMENT (OUTPATIENT)
Dept: RADIATION ONCOLOGY | Facility: CLINIC | Age: 63
End: 2023-09-21

## 2023-09-28 ENCOUNTER — APPOINTMENT (OUTPATIENT)
Dept: OTOLARYNGOLOGY | Facility: CLINIC | Age: 63
End: 2023-09-28

## 2023-09-28 ENCOUNTER — APPOINTMENT (OUTPATIENT)
Dept: GYNECOLOGIC ONCOLOGY | Facility: CLINIC | Age: 63
End: 2023-09-28
Payer: COMMERCIAL

## 2023-09-28 VITALS
BODY MASS INDEX: 26.31 KG/M2 | HEART RATE: 78 BPM | TEMPERATURE: 98.3 F | DIASTOLIC BLOOD PRESSURE: 75 MMHG | HEIGHT: 60 IN | WEIGHT: 134 LBS | SYSTOLIC BLOOD PRESSURE: 175 MMHG

## 2023-09-28 PROCEDURE — 99024 POSTOP FOLLOW-UP VISIT: CPT

## 2023-09-29 ENCOUNTER — NON-APPOINTMENT (OUTPATIENT)
Age: 63
End: 2023-09-29

## 2023-10-05 ENCOUNTER — APPOINTMENT (OUTPATIENT)
Dept: GYNECOLOGIC ONCOLOGY | Facility: CLINIC | Age: 63
End: 2023-10-05
Payer: COMMERCIAL

## 2023-10-05 VITALS
SYSTOLIC BLOOD PRESSURE: 177 MMHG | DIASTOLIC BLOOD PRESSURE: 70 MMHG | HEIGHT: 60 IN | TEMPERATURE: 98.2 F | BODY MASS INDEX: 26.31 KG/M2 | HEART RATE: 72 BPM | WEIGHT: 134 LBS

## 2023-10-05 PROCEDURE — 99024 POSTOP FOLLOW-UP VISIT: CPT

## 2023-10-06 ENCOUNTER — APPOINTMENT (OUTPATIENT)
Dept: RADIATION ONCOLOGY | Facility: CLINIC | Age: 63
End: 2023-10-06
Payer: COMMERCIAL

## 2023-10-06 VITALS
HEART RATE: 66 BPM | TEMPERATURE: 96.98 F | OXYGEN SATURATION: 100 % | DIASTOLIC BLOOD PRESSURE: 70 MMHG | BODY MASS INDEX: 26.31 KG/M2 | RESPIRATION RATE: 17 BRPM | HEIGHT: 60 IN | SYSTOLIC BLOOD PRESSURE: 176 MMHG | WEIGHT: 134 LBS

## 2023-10-06 DIAGNOSIS — M84.40XA PATHOLOGICAL FRACTURE, UNSPECIFIED SITE, INITIAL ENCOUNTER FOR FRACTURE: ICD-10-CM

## 2023-10-06 PROCEDURE — 99212 OFFICE O/P EST SF 10 MIN: CPT

## 2023-10-06 RX ORDER — CYCLOBENZAPRINE HCL 5 MG
TABLET ORAL
Refills: 0 | Status: ACTIVE | COMMUNITY

## 2023-10-09 PROBLEM — M84.40XA INSUFFICIENCY FRACTURE: Status: ACTIVE | Noted: 2023-04-30

## 2023-10-12 ENCOUNTER — APPOINTMENT (OUTPATIENT)
Dept: GYNECOLOGIC ONCOLOGY | Facility: CLINIC | Age: 63
End: 2023-10-12
Payer: COMMERCIAL

## 2023-10-12 VITALS
DIASTOLIC BLOOD PRESSURE: 72 MMHG | HEIGHT: 60 IN | HEART RATE: 84 BPM | WEIGHT: 134 LBS | SYSTOLIC BLOOD PRESSURE: 166 MMHG | BODY MASS INDEX: 26.31 KG/M2 | TEMPERATURE: 208.04 F

## 2023-10-12 PROCEDURE — 99024 POSTOP FOLLOW-UP VISIT: CPT

## 2023-10-13 ENCOUNTER — APPOINTMENT (OUTPATIENT)
Dept: PLASTIC SURGERY | Facility: CLINIC | Age: 63
End: 2023-10-13
Payer: COMMERCIAL

## 2023-10-13 VITALS
HEIGHT: 60 IN | BODY MASS INDEX: 26.31 KG/M2 | SYSTOLIC BLOOD PRESSURE: 181 MMHG | HEART RATE: 84 BPM | OXYGEN SATURATION: 99 % | WEIGHT: 134 LBS | DIASTOLIC BLOOD PRESSURE: 81 MMHG

## 2023-10-13 PROCEDURE — XXXXX: CPT

## 2023-10-13 RX ORDER — DIAZEPAM 5 MG/1
5 TABLET ORAL
Qty: 3 | Refills: 0 | Status: DISCONTINUED | COMMUNITY
Start: 2022-06-29 | End: 2023-10-13

## 2023-10-13 RX ORDER — MELOXICAM 15 MG/1
TABLET ORAL
Refills: 0 | Status: DISCONTINUED | COMMUNITY
End: 2023-10-13

## 2023-10-13 RX ORDER — METOPROLOL SUCCINATE 25 MG/1
25 TABLET, EXTENDED RELEASE ORAL
Qty: 30 | Refills: 0 | Status: ACTIVE | COMMUNITY
Start: 2022-01-19

## 2023-10-13 RX ORDER — UBIDECARENONE 200 MG
400 CAPSULE ORAL
Refills: 0 | Status: DISCONTINUED | COMMUNITY
End: 2023-10-13

## 2023-10-14 ENCOUNTER — OUTPATIENT (OUTPATIENT)
Dept: OUTPATIENT SERVICES | Facility: HOSPITAL | Age: 63
LOS: 1 days | End: 2023-10-14
Payer: COMMERCIAL

## 2023-10-14 ENCOUNTER — APPOINTMENT (OUTPATIENT)
Dept: MRI IMAGING | Facility: CLINIC | Age: 63
End: 2023-10-14
Payer: COMMERCIAL

## 2023-10-14 DIAGNOSIS — Z98.890 OTHER SPECIFIED POSTPROCEDURAL STATES: Chronic | ICD-10-CM

## 2023-10-14 DIAGNOSIS — Z92.3 PERSONAL HISTORY OF IRRADIATION: Chronic | ICD-10-CM

## 2023-10-14 DIAGNOSIS — C51.9 MALIGNANT NEOPLASM OF VULVA, UNSPECIFIED: ICD-10-CM

## 2023-10-14 DIAGNOSIS — Z92.21 PERSONAL HISTORY OF ANTINEOPLASTIC CHEMOTHERAPY: Chronic | ICD-10-CM

## 2023-10-14 DIAGNOSIS — Z00.8 ENCOUNTER FOR OTHER GENERAL EXAMINATION: ICD-10-CM

## 2023-10-14 PROCEDURE — 72197 MRI PELVIS W/O & W/DYE: CPT

## 2023-10-14 PROCEDURE — 72197 MRI PELVIS W/O & W/DYE: CPT | Mod: 26

## 2023-10-14 PROCEDURE — A9585: CPT

## 2023-10-23 ENCOUNTER — NON-APPOINTMENT (OUTPATIENT)
Age: 63
End: 2023-10-23

## 2023-10-26 ENCOUNTER — APPOINTMENT (OUTPATIENT)
Dept: GYNECOLOGIC ONCOLOGY | Facility: CLINIC | Age: 63
End: 2023-10-26
Payer: COMMERCIAL

## 2023-10-26 ENCOUNTER — NON-APPOINTMENT (OUTPATIENT)
Age: 63
End: 2023-10-26

## 2023-10-26 PROCEDURE — 99024 POSTOP FOLLOW-UP VISIT: CPT

## 2023-10-27 ENCOUNTER — APPOINTMENT (OUTPATIENT)
Dept: OTOLARYNGOLOGY | Facility: CLINIC | Age: 63
End: 2023-10-27
Payer: COMMERCIAL

## 2023-10-27 VITALS
DIASTOLIC BLOOD PRESSURE: 70 MMHG | BODY MASS INDEX: 26.31 KG/M2 | HEIGHT: 60 IN | SYSTOLIC BLOOD PRESSURE: 163 MMHG | WEIGHT: 134 LBS | HEART RATE: 71 BPM

## 2023-10-27 DIAGNOSIS — R94.02 ABNORMAL BRAIN SCAN: ICD-10-CM

## 2023-10-27 PROCEDURE — 31575 DIAGNOSTIC LARYNGOSCOPY: CPT

## 2023-10-27 PROCEDURE — 99213 OFFICE O/P EST LOW 20 MIN: CPT | Mod: 25

## 2023-10-30 RX ORDER — DOXYCYCLINE HYCLATE 100 MG/1
100 TABLET ORAL
Qty: 28 | Refills: 0 | Status: ACTIVE | COMMUNITY
Start: 2023-10-16

## 2023-11-09 NOTE — ASU PREOP CHECKLIST - ANTIBIOTIC
Detail Level: Detailed Quality 110: Preventive Care And Screening: Influenza Immunization: Influenza Immunization Administered during Influenza season n/a

## 2023-11-11 ENCOUNTER — APPOINTMENT (OUTPATIENT)
Dept: NUCLEAR MEDICINE | Facility: CLINIC | Age: 63
End: 2023-11-11
Payer: COMMERCIAL

## 2023-11-11 ENCOUNTER — OUTPATIENT (OUTPATIENT)
Dept: OUTPATIENT SERVICES | Facility: HOSPITAL | Age: 63
LOS: 1 days | End: 2023-11-11
Payer: COMMERCIAL

## 2023-11-11 DIAGNOSIS — Z98.890 OTHER SPECIFIED POSTPROCEDURAL STATES: Chronic | ICD-10-CM

## 2023-11-11 DIAGNOSIS — Z92.3 PERSONAL HISTORY OF IRRADIATION: Chronic | ICD-10-CM

## 2023-11-11 DIAGNOSIS — M89.9 DISORDER OF BONE, UNSPECIFIED: ICD-10-CM

## 2023-11-11 DIAGNOSIS — M84.40XA PATHOLOGICAL FRACTURE, UNSPECIFIED SITE, INITIAL ENCOUNTER FOR FRACTURE: ICD-10-CM

## 2023-11-11 DIAGNOSIS — C51.9 MALIGNANT NEOPLASM OF VULVA, UNSPECIFIED: ICD-10-CM

## 2023-11-11 DIAGNOSIS — Z92.21 PERSONAL HISTORY OF ANTINEOPLASTIC CHEMOTHERAPY: Chronic | ICD-10-CM

## 2023-11-11 PROCEDURE — 78815 PET IMAGE W/CT SKULL-THIGH: CPT | Mod: 26,PS

## 2023-11-11 PROCEDURE — A9552: CPT

## 2023-11-11 PROCEDURE — 78815 PET IMAGE W/CT SKULL-THIGH: CPT

## 2023-11-16 ENCOUNTER — APPOINTMENT (OUTPATIENT)
Dept: GYNECOLOGIC ONCOLOGY | Facility: CLINIC | Age: 63
End: 2023-11-16
Payer: COMMERCIAL

## 2023-11-16 VITALS
BODY MASS INDEX: 26.31 KG/M2 | HEART RATE: 68 BPM | WEIGHT: 134 LBS | TEMPERATURE: 207.32 F | DIASTOLIC BLOOD PRESSURE: 74 MMHG | SYSTOLIC BLOOD PRESSURE: 144 MMHG | HEIGHT: 60 IN

## 2023-11-16 PROCEDURE — 99212 OFFICE O/P EST SF 10 MIN: CPT

## 2023-11-19 RX ORDER — MELOXICAM 7.5 MG/1
7.5 TABLET ORAL
Qty: 30 | Refills: 0 | Status: ACTIVE | COMMUNITY
Start: 2023-09-26

## 2023-11-30 ENCOUNTER — OUTPATIENT (OUTPATIENT)
Dept: OUTPATIENT SERVICES | Facility: HOSPITAL | Age: 63
LOS: 1 days | Discharge: HOME | End: 2023-11-30

## 2023-11-30 DIAGNOSIS — Z98.890 OTHER SPECIFIED POSTPROCEDURAL STATES: Chronic | ICD-10-CM

## 2023-11-30 DIAGNOSIS — Z92.21 PERSONAL HISTORY OF ANTINEOPLASTIC CHEMOTHERAPY: Chronic | ICD-10-CM

## 2023-11-30 DIAGNOSIS — Z92.3 PERSONAL HISTORY OF IRRADIATION: Chronic | ICD-10-CM

## 2023-11-30 DIAGNOSIS — C51.9 MALIGNANT NEOPLASM OF VULVA, UNSPECIFIED: ICD-10-CM

## 2023-12-06 ENCOUNTER — APPOINTMENT (OUTPATIENT)
Dept: INTERVENTIONAL RADIOLOGY/VASCULAR | Facility: CLINIC | Age: 63
End: 2023-12-06
Payer: COMMERCIAL

## 2023-12-06 VITALS — WEIGHT: 132 LBS | BODY MASS INDEX: 24.92 KG/M2 | HEIGHT: 61 IN

## 2023-12-06 PROCEDURE — 99214 OFFICE O/P EST MOD 30 MIN: CPT | Mod: 95

## 2023-12-29 ENCOUNTER — RESULT REVIEW (OUTPATIENT)
Age: 63
End: 2023-12-29

## 2023-12-29 ENCOUNTER — OUTPATIENT (OUTPATIENT)
Dept: OUTPATIENT SERVICES | Facility: HOSPITAL | Age: 63
LOS: 1 days | End: 2023-12-29
Payer: COMMERCIAL

## 2023-12-29 DIAGNOSIS — Z98.890 OTHER SPECIFIED POSTPROCEDURAL STATES: Chronic | ICD-10-CM

## 2023-12-29 DIAGNOSIS — Z92.3 PERSONAL HISTORY OF IRRADIATION: Chronic | ICD-10-CM

## 2023-12-29 DIAGNOSIS — Z92.21 PERSONAL HISTORY OF ANTINEOPLASTIC CHEMOTHERAPY: Chronic | ICD-10-CM

## 2023-12-29 DIAGNOSIS — M89.9 DISORDER OF BONE, UNSPECIFIED: ICD-10-CM

## 2023-12-29 PROCEDURE — 20220 BONE BIOPSY TROCAR/NDL SUPFC: CPT

## 2023-12-29 PROCEDURE — 88173 CYTOPATH EVAL FNA REPORT: CPT | Mod: 26

## 2023-12-29 PROCEDURE — 88307 TISSUE EXAM BY PATHOLOGIST: CPT | Mod: 26

## 2023-12-29 PROCEDURE — 88341 IMHCHEM/IMCYTCHM EA ADD ANTB: CPT | Mod: 26

## 2023-12-29 PROCEDURE — 77012 CT SCAN FOR NEEDLE BIOPSY: CPT | Mod: 26

## 2023-12-29 PROCEDURE — 88342 IMHCHEM/IMCYTCHM 1ST ANTB: CPT | Mod: 26

## 2023-12-29 RX ORDER — IBUPROFEN 200 MG
1 TABLET ORAL
Qty: 0 | Refills: 0 | DISCHARGE

## 2023-12-29 RX ORDER — ACETAMINOPHEN 500 MG
3 TABLET ORAL
Qty: 0 | Refills: 0 | DISCHARGE

## 2023-12-29 RX ORDER — PREGABALIN 225 MG/1
0 CAPSULE ORAL
Refills: 0 | DISCHARGE

## 2023-12-29 RX ORDER — CHOLECALCIFEROL (VITAMIN D3) 125 MCG
0 CAPSULE ORAL
Refills: 0 | DISCHARGE

## 2023-12-29 RX ORDER — CETIRIZINE HYDROCHLORIDE 10 MG/1
1 TABLET ORAL
Refills: 0 | DISCHARGE

## 2023-12-29 NOTE — PRE PROCEDURE NOTE - PRE PROCEDURE EVALUATION
64 y/o F w/ hx vulvar CA found to have iliac lesion, here for CT guided L iliac bone bx. Patient is stable for procedure.  62 y/o F w/ hx vulvar CA found to have iliac lesion, here for CT guided L iliac bone bx. Patient is stable for procedure.

## 2024-01-02 ENCOUNTER — APPOINTMENT (OUTPATIENT)
Dept: RADIATION ONCOLOGY | Facility: CLINIC | Age: 64
End: 2024-01-02

## 2024-01-03 ENCOUNTER — APPOINTMENT (OUTPATIENT)
Dept: HEMATOLOGY ONCOLOGY | Facility: CLINIC | Age: 64
End: 2024-01-03

## 2024-01-03 NOTE — ED PROVIDER NOTE - COVID-19 RESULT DATE/TIME
Covering for provider out of office: Continue off of metoprolol. Can offer 48 hour Holter to further assess for possible symptomatic bradycardia.    05-Jul-2022 19:56

## 2024-01-05 LAB
NON-GYNECOLOGICAL CYTOLOGY STUDY: SIGNIFICANT CHANGE UP
NON-GYNECOLOGICAL CYTOLOGY STUDY: SIGNIFICANT CHANGE UP

## 2024-01-23 ENCOUNTER — APPOINTMENT (OUTPATIENT)
Dept: RADIATION ONCOLOGY | Facility: CLINIC | Age: 64
End: 2024-01-23
Payer: COMMERCIAL

## 2024-01-23 ENCOUNTER — OUTPATIENT (OUTPATIENT)
Dept: OUTPATIENT SERVICES | Facility: HOSPITAL | Age: 64
LOS: 1 days | Discharge: HOME | End: 2024-01-23

## 2024-01-23 DIAGNOSIS — C51.9 MALIGNANT NEOPLASM OF VULVA, UNSPECIFIED: ICD-10-CM

## 2024-01-23 DIAGNOSIS — Z98.890 OTHER SPECIFIED POSTPROCEDURAL STATES: Chronic | ICD-10-CM

## 2024-01-23 DIAGNOSIS — M89.9 DISORDER OF BONE, UNSPECIFIED: ICD-10-CM

## 2024-01-23 DIAGNOSIS — Z92.21 PERSONAL HISTORY OF ANTINEOPLASTIC CHEMOTHERAPY: Chronic | ICD-10-CM

## 2024-01-23 DIAGNOSIS — Z92.3 PERSONAL HISTORY OF IRRADIATION: Chronic | ICD-10-CM

## 2024-01-23 DIAGNOSIS — Z92.3 PERSONAL HISTORY OF IRRADIATION: ICD-10-CM

## 2024-01-23 PROCEDURE — 99212 OFFICE O/P EST SF 10 MIN: CPT

## 2024-01-31 ENCOUNTER — APPOINTMENT (OUTPATIENT)
Dept: HEMATOLOGY ONCOLOGY | Facility: CLINIC | Age: 64
End: 2024-01-31
Payer: COMMERCIAL

## 2024-01-31 VITALS
HEART RATE: 68 BPM | TEMPERATURE: 207.5 F | OXYGEN SATURATION: 97 % | SYSTOLIC BLOOD PRESSURE: 158 MMHG | DIASTOLIC BLOOD PRESSURE: 69 MMHG | WEIGHT: 132.28 LBS | HEIGHT: 60.98 IN | RESPIRATION RATE: 17 BRPM | BODY MASS INDEX: 24.97 KG/M2

## 2024-01-31 PROCEDURE — 99213 OFFICE O/P EST LOW 20 MIN: CPT

## 2024-02-01 NOTE — HISTORY OF PRESENT ILLNESS
[Disease: _____________________] : Disease: [unfilled] [AJCC Stage: ____] : AJCC Stage: [unfilled] [de-identified] : 61 year old woman with hx of HTN, celiac disease presenting for initial consultation of vulvar mass.\par  \par  Pt states she was diagnosed with TATYANA 3 10/2020 by Dr. Jane and was started on Aldara. She returned to her clinic 3 months ago as patient discovered a vaginal cyst. She had a biopsy performed 6/23/22.\par  \par  MR Pelvis 6/30/22: \par  IMPRESSION:\par  Large vulvar mass 5.5 x 4.8 x 2.5 cm  extends to the anterior lower vagina and distal urethra.\par  Right inguinal lymphadenopathy  1.8 x 1.3 cm highly suspicious for hermann involvement by carcinoma\par  \par  PET/CT 6/2022\par  IMPRESSION: Abnormal FDG-PET/CT scan.\par  1. FDG avidity in bilateral vulva compatible with newly diagnosed malignancy, with extension of FDG avidity into the vaginal region concerning for disease involvement.\par  2. FDG avid right inguinal lymph nodes compatible with metastases. Mildly FDG avid single left inguinal lymph node concerning for additional metastatic disease; this may be further evaluated with ultrasound and possible FNA biopsy as indicated.\par  Feeling well. Endorses slight fatigue, felt it may be related to work. Appetite well but endorses weight loss,  States had painful urination a over a year but has improved. Denies fevers, SOB, CP, AP, hematuria, \par  slight bleeding with wiping and associated pain. \par  \par  LMP 2008  [de-identified] : Squamous Cell carcinoma, vulvar [de-identified] : Patient seeing Dr. Mesa for possible biopsy of the bone. She feels well, maintaining weight. Previous bone biopsy was inconclusive.

## 2024-02-15 ENCOUNTER — APPOINTMENT (OUTPATIENT)
Dept: GYNECOLOGIC ONCOLOGY | Facility: CLINIC | Age: 64
End: 2024-02-15
Payer: COMMERCIAL

## 2024-02-15 DIAGNOSIS — C51.9 MALIGNANT NEOPLASM OF VULVA, UNSPECIFIED: ICD-10-CM

## 2024-02-15 PROCEDURE — 99213 OFFICE O/P EST LOW 20 MIN: CPT

## 2024-02-27 PROBLEM — M89.9 LESION OF PELVIC BONE: Status: ACTIVE | Noted: 2023-10-27

## 2024-02-27 PROBLEM — Z92.3 HISTORY OF RADIATION THERAPY: Status: ACTIVE | Noted: 2022-09-06

## 2024-02-27 NOTE — HISTORY OF PRESENT ILLNESS
[FreeTextEntry1] : Ms. MATT is a 63 year old female with vulvar SCC, who was initially diagnosed with HG squamous intraepithelial lesion of the vulva (TATYANA 3) by Dr. Jane, who was lost to follow up until June 2022 when imaging and biopsy confirmed vulvar SCC. She received a total dose of 6400 cGy to the pelvis/vulva/inguinal lymph node with a cone down. Post treatment MRI 11/23/22: complete vs near complete treatment response; the vulvar mass measuring 2.4 x 0.8 cm markedly decrease in size and predominantly fibrotic, with areas equivocal for small residual disease. Interval decrease in right inguinal adenopathy now 1.0 x 0.6 cm previously 1.8 x 1.3 cm.  01/28/23: PET/CT: Sacroiliac findings suggestive of postradiation insufficiency fractures. Foci in the precarinal and bilateral hilar regions in the chest are nonspecific and may be reactive. Attention to followup. Otherwise response to therapy observed at vulva and previously seen inguinal lymph nodes, with essentially resolved activity and anatomic regression.  03/25/2023- MRI pelvis concerning pelvic fracture. FLAKITO  5/27/23- PET /CT noted new left pubic ramus suspected lesion, with sacroiliac insufficiency fractures  Saw Dr. Jane in May and is s/p vulva biopsy. Results showed Differentiated TATYANA. Around that time she also suffered a fall while walking. Denies LOC. Was lifting something heavy recently and reports pulling pain to the left groin since.  Since then the patient is status post Partial simple vulvectomy with and resection of distal clitoris . Pathology returned showing invasive moderately differentiated SCC in both the promise-clitoral lesion and the distal clitoris and L labial lesions.  MRI pelvis on 10/14 noted Increased marrow signal abnormality within the left iliac bone which extends into the left iliac crest and is suspicious for osseous metastatic disease. Multiple insufficiency fractures as above.  PET/CT  11/2023 noted bone changes in the left iliac bone with malignancy a concern.  S/P bone biopsy which was benign.  She returns today for a routine follow up. Has pulling sensation to the pelvis, unchanged. Will be calling gyn onc office to determine second surgery which was postponed due to recent biopsy.

## 2024-02-27 NOTE — PHYSICAL EXAM
[] : no respiratory distress [General Appearance - In No Acute Distress] : in no acute distress [Abdomen Soft] : soft [Inguinal Lymph Nodes Enlarged Bilaterally] : inguinal [de-identified] : radiation telangectasia outlining vulva radiation prior bx sites

## 2024-03-06 NOTE — HISTORY OF PRESENT ILLNESS
[FreeTextEntry1] : Ms. RASHAUN MATT initially presented for evaluation of TATYANA 3 in 10/2020 and did not return for followup until 6/2022 when she had developed invasive vulvar SCC.. Biopsy in 9/2020 showed TATYANA 3 extending to the tissue edges.  She had an episode of vaginal spotting in 2019; in retrospect may have been from vulva .  Endometrial biopsy was reportedly postponed secondary to pandemic.  Pelvic US in 2019 showed an endometrial lining of 3-4 mm.  3/11/2019, Pap Smear: HPVHR mRNA not detected, negative for intraepithelial lesion or malignancy  9/18/2019, Pelvic sonogram with IVT: Uterus: 5.2 x 2.5 x 4.3 cm Endometrial: 3-4 mm with a tiny amount of fluid Right ovary: 2.3 x 1.3 x 1.0 cm Left ovary: 1.8 x 0.9 x 1.8 cm 9/11/2020, [facility], vulvar lesion: TATYANA 3, nonkeratinizing type, extending to the tissue edges. 9/11/2020, Pap smear: HPV mRNA HR not detected, negative for intraepithelial lesion or malignancy 10/1/20: Clitoris lesion, biopsy: VIN2 She was seen by Urology for microscopic hematuria, h/o renal stones.  6/23/22: Vulvar mass identified on exam; biopsy confirmed SCC 6/30/22: Pelvic MRI- Approximately 5.5 x 4.8 x 2.5 cm vulvar mass, asymmetrically greater to the left of midline, extends to the anterior lower vagina and distal urethra. Vagina is distended by gel which was administered for this exam. LYMPH NODES: Three adjacent abnormal right inguinal lymph nodes measuring up to 1.8 x 1.3 cm 6/30/22: PET/CT - IMPRESSION: Abnormal FDG-PET/CT scan. 1. FDG avidity in bilateral vulva compatible with newly diagnosed malignancy, with extension of FDG avidity into the vaginal region concerning for disease involvement. 2. FDG avid right inguinal lymph nodes compatible with metastases. Mildly FDG avid single left inguinal lymph node concerning for additional metastatic disease; this may be further evaluated with ultrasound and possible FNA biopsy as indicated. 3. Nonspecific mildly asymmetric FDG activity in the right base of tongue. Correlate clinically or with direct visualization.  She initiated chemoradiation with Dr. Carreon and Dr. Sanders.  She received a total dose of 6400 cGy to the pelvis/vulva/inguinal lymph node with a cone down. Post treatment MRI 11/23/22: complete vs near complete treatment response; the vulvar mass measuring 2.4 x 0.8 cm markedly decrease in size and predominantly fibrotic, with areas equivocal for small residual disease. Interval decrease in right inguinal adenopathy now 1.0 x 0.6 cm previously 1.8 x 1.3 cm.  Due to pelvic pain, a pelvic MRI was ordered 3/2023- Bony Pelvis MRI- Acute bilateral sacral alar insufficiency fractures with extension of fracture lines into the S1 and S2 vertebral bodies. Extensive edema along the iliac aspect of the sacroiliac joints bilaterally, which may be related to secondary degenerative changes of the sacroiliac joints. However, underlying insufficiency fractures can not be excluded. Incompletely evaluated edema with T1 marrow placement within the LEFT pubic body, which may represent an additional fracture. However, an underlying pathologic marrow replacing lesion within the LEFT pubic body, such as metastatic disease, cannot be excluded and is also within the differential. Consider short-term interval follow-up MRI of the bony pelvis with full field of view for further evaluation. Questionable acute nondisplaced fracture of the RIGHT pubic body, which is incompletely evaluated on this exam, as it is only seen on the sagittal images. Edema within the transverse processes of L5 bilaterally, which may be related to nondisplaced fractures or be related to enthesopathic changes.  She saw Ortho , Dr. Mesa- Patient has insufficiency fractures in her sacrum which are not neurologically compromising. This is most likely because of her radiation to her vulva. Recommended physical therapy and/or see physiatry and follow-up again with x-rays in 4 months or she can follow-up with neurosurgery as well with regard to these lesions.  She had some mild residual tenderness in the periclitoral area, and occasional spotting. 5/25/23- Lesion seen on routine followup exam - Right vulva and Left vulva biopsy- dVIN.  5/27/23- PET/CT- 1.  Recently described suspected lesion in the LEFT pubic ramus is new since the prior PET/CT and demonstrates associated uptake. Metastatic disease is not excluded. 2.  Sacroiliac findings again consistent with insufficiency fractures. A fracture line is more readily apparent on the RIGHT compared to the prior exam. However, uptake associated with these fractures is now reduced. 3.  Foci in the precarinal and BILATERAL hilar regions in the chest which were previously seen have now resolved. 4.  Uptake at the perineum is likely contamination.. Otherwise, no new suspicious uptake at vulva or inguinal lymph nodes.  Ortho deemed suspected lesion not to be of concern.  She has ongoing discomfort related to the pelvic fractures. She notices some "bumpiness" at the prior biopsy sites. 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  8/2023- Vulvectomy- . Lisa-clitoral lesion, right, excision - Invasive moderately differentiated squamous cell carcinoma (depth of invasion 2 mm), see synoptic summary - Differentiated vulvar intraepithelial neoplasia involves lateral and medial margins 2. Distal clitoris and left labia minora, excision - Invasive moderately differentiated squamous cell carcinoma  (depth of invasion 4 mm), see synoptic summary - Differentiated vulvar intraepithelial neoplasia (Differentiated TATYANA) involves lateral, medial and deep margins  Incisional dehiscence was noted on postoperative exam.  However, continued to granulate well.   Tumor Board Recommendation: Diagnosis: Recurrent vulvar cancer Recommendation: MRI with bone sequence to evaluate lesion on pubic ramus seen on PET/CT; Pending imaging, modified anterior radical vulvectomy with flap reconstruction  She saw Dr. Malone for discussion of further surgery.  11/2023- PET/CT- 1. Elongated activity localizing to both sides of the vulva likely a combination urine contamination and postsurgical process; please correlate with findings on direct examination. No abnormal activity in the uterus or cervix. No hypermetabolic pelvic sidewall or retroperitoneal nodes. 2. Healing fractures in the left superior pubic ramus and bilateral sacral alae with nonspecific patches of FDG activity,  likely reflective of nonspecific bone healing process. 3. Bone changes in the left iliac bone including new lucent and sclerotic changes near the iliac crest with increased activity; although findings may also be posttraumatic in the background of posttherapy changes, malignancy is a concern in light of recent MRI findings. Consider tissue evaluation for more definitive assessment.  11/2023- Pelvic MRI- Increased marrow signal abnormality within the left iliac bone which extends into the left iliac crest and is suspicious for osseous metastatic disease. Multiple insufficiency fractures as above.  12/2023- BONE, ILIAC, LEFT, CT GUIDED CORE BIOPSY- NON DIAGNOSTIC Benign bone present  She returns for re-exam today.   Health Maintenance: Mammo- 8/2022 BIRADS2 Colonoscopy- 2015, normal (first colonoscopy had a tubular adenoma)  Referring MD- Dr. Renetta Thompson (hasn't seen since 2020) PCP- Dr. Ron Coburn GI- Dr. Stveie Wang (retired) - Dr. Goldberg

## 2024-03-06 NOTE — ASSESSMENT
[FreeTextEntry1] : 64y/o with h/o invasive vulvar SCC treated with chemoRT and s/p resection with margins positive for dVIN, subsequent resection delayed by multiple issues and most recently question of bony metastasis. Wound separation has healed.

## 2024-03-06 NOTE — PHYSICAL EXAM
[Chaperone Present] : A chaperone was present in the examining room during all aspects of the physical examination [Normal] : Anus and perineum: Normal sphincter tone, no masses, no prolapse. [de-identified] : post-RT skin changes; symmetric post-granulation tissue noted anterior to urethra, unclear if disease present, no gross evidence carcinoma, <1cm gross margin to urethra.

## 2024-03-06 NOTE — DISCUSSION/SUMMARY
[Reviewed Clinical Lab Test(s)] : Results of clinical tests were reviewed. [Reviewed Radiology Report(s)] : Radiology reports were reviewed. [Reviewed Radiology Film/Image(s)] : Images from radiology studies were reviewed and examined. [Discuss Tests w/Referring Providers] : Results of labs/radiology studies and the treatment recommendations were discussed with performing/referring physician. [Discuss Alternatives/Risks/Benefits w/Patient] : All alternatives, risks, and benefits were discussed with the patient/family and all questions were answered.  Patient expressed good understanding and appreciates the importance of follow up as recommended. [FreeTextEntry1] : - exam findings discussed with Zaria - I will continue to try to discuss with Dr. Mesa re whether the negative bone biopsy needs to be repeated. - I will discuss with her other treating physicans re re-scheduling of the surgery - the high risk of cancer recurrence discussed;  - all questions were answered.   Addendum: After email discussions with Dr. Mesa, it appears that surgery is a reasonable fcourse of action and the bone lesion can be followed with imaging. Surgical orders placed and I discussed case witH Dr. Malone again; patient wants to defer surgery until April. LDS

## 2024-03-19 ENCOUNTER — APPOINTMENT (OUTPATIENT)
Dept: PLASTIC SURGERY | Facility: CLINIC | Age: 64
End: 2024-03-19
Payer: COMMERCIAL

## 2024-03-19 VITALS
HEART RATE: 78 BPM | WEIGHT: 132 LBS | HEIGHT: 60.98 IN | SYSTOLIC BLOOD PRESSURE: 140 MMHG | OXYGEN SATURATION: 97 % | DIASTOLIC BLOOD PRESSURE: 78 MMHG | TEMPERATURE: 208.4 F | BODY MASS INDEX: 24.92 KG/M2

## 2024-03-19 DIAGNOSIS — D07.1 CARCINOMA IN SITU OF VULVA: ICD-10-CM

## 2024-03-19 PROCEDURE — 99213 OFFICE O/P EST LOW 20 MIN: CPT

## 2024-03-19 NOTE — HISTORY OF PRESENT ILLNESS
Report called to Angelica Barajas RN  (charge nurse) Ajith Moe [[email or web address]] : 63-year-old female referred by Dr. Jane with TATYANA 3 in 10/2020 Biopsy in 9/2020 showed TATYANA 3 extending to the tissue edges. She had an episode of vaginal spotting in 2019; in retrospect may have been from vulva . Endometrial biopsy was reportedly postponed secondary to pandemic. Pelvic US in 2019 showed an endometrial lining of 3-4 mm. 3/11/2019, Pap Smear: HPVHR mRNA not detected, negative for intraepithelial lesion or malignancy 9/18/2019, Pelvic sonogram with IVT: Uterus: 5.2 x 2.5 x 4.3 cm Endometrial: 3-4 mm with a tiny amount of fluid Right ovary: 2.3 x 1.3 x 1.0 cm Left ovary: 1.8 x 0.9 x 1.8 cm 9/11/2020, [facility], vulvar lesion: TATYANA 3, nonkeratinizing type, extending to the tissue edges. 9/11/2020, Pap smear: HPV mRNA HR not detected, negative for intraepithelial lesion or malignancy 10/1/20: Clitoris lesion, biopsy: VIN2 She was seen by Urology for microscopic hematuria, h/o renal stones.  6/23/22: Vulvar mass identified on exam; biopsy confirmed SCC 6/30/22: Pelvic MRI- Approximately 5.5 x 4.8 x 2.5 cm vulvar mass, asymmetrically greater to the left of midline, extends to the anterior lower vagina and distal urethra. Vagina is distended by gel which was administered for this exam. LYMPH NODES: Three adjacent abnormal right inguinal lymph nodes measuring up to 1.8 x 1.3 cm 6/30/22: PET/CT - IMPRESSION: Abnormal FDG-PET/CT scan. 1. FDG avidity in bilateral vulva compatible with newly diagnosed malignancy, with extension of FDG avidity into the vaginal region concerning for disease involvement. 2. FDG avid right inguinal lymph nodes compatible with metastases. Mildly FDG avid single left inguinal lymph node concerning for additional metastatic disease; this may be further evaluated with ultrasound and possible FNA biopsy as indicated. 3. Nonspecific mildly asymmetric FDG activity in the right base of tongue. Correlate clinically or with direct visualization.  She initiated chemoradiation with Dr. Carreon and Dr. Sanders. She received a total dose of 6400 cGy to the pelvis/vulva/inguinal lymph node with a cone down. Post treatment MRI 11/23/22: complete vs near complete treatment response; the vulvar mass measuring 2.4 x 0.8 cm markedly decrease in size and predominantly fibrotic, with areas equivocal for small residual disease. Interval decrease in right inguinal adenopathy now 1.0 x 0.6 cm previously 1.8 x 1.3 cm.  Due to pelvic pain, a pelvic MRI was ordered 3/2023- Bony Pelvis MRI- Acute bilateral sacral alar insufficiency fractures with extension of fracture lines into the S1 and S2 vertebral bodies. Extensive edema along the iliac aspect of the sacroiliac joints bilaterally, which may be related to secondary degenerative changes of the sacroiliac joints. However, underlying insufficiency fractures can not be excluded. Incompletely evaluated edema with T1 marrow placement within the LEFT pubic body, which may represent an additional fracture. However, an underlying pathologic marrow replacing lesion within the LEFT pubic body, such as metastatic disease, cannot be excluded and is also within the differential. Consider short-term interval follow-up MRI of the bony pelvis with full field of view for further evaluation. Questionable acute nondisplaced fracture of the RIGHT pubic body, which is incompletely evaluated on this exam, as it is only seen on the sagittal images. Edema within the transverse processes of L5 bilaterally, which may be related to nondisplaced fractures or be related to enthesopathic changes.  She saw Ortho , Dr. Mesa- Patient has insufficiency fractures in her sacrum which are not neurologically compromising. This is most likely because of her radiation to her vulva. Recommended physical therapy and/or see physiatry and follow-up again with x-rays in 4 months or she can follow-up with neurosurgery as well with regard to these lesions.  She had some mild residual tenderness in the periclitoral area, and occasional spotting. 5/25/23- Lesion seen on routine followup exam - Right vulva and Left vulva biopsy- dVIN.  5/27/23- PET/CT- 1. Recently described suspected lesion in the LEFT pubic ramus is new since the prior PET/CT and demonstrates associated uptake. Metastatic disease is not excluded. 2. Sacroiliac findings again consistent with insufficiency fractures. A fracture line is more readily apparent on the RIGHT compared to the prior exam. However, uptake associated with these fractures is now reduced. 3. Foci in the precarinal and BILATERAL hilar regions in the chest which were previously seen have now resolved. 4. Uptake at the perineum is likely contamination.. Otherwise, no new suspicious uptake at vulva or inguinal lymph nodes.  Ortho deemed suspected lesion not to be of concern.  She has ongoing discomfort related to the pelvic fractures. She notices some "bumpiness" at the prior biopsy sites. 5/2023 Right vulva, biopsy  - Differentiated vulvar intraepithelial neoplasia (Differentiated TATYANA) with atypical Left vulva, biopsy  - Differentiated vulvar intraepithelial neoplasia (Differentiated TATYANA).  8/2023- Vulvectomy- . Lisa-clitoral lesion, right, excision - Invasive moderately differentiated squamous cell carcinoma (depth of invasion 2 mm), see synoptic summary - Differentiated vulvar intraepithelial neoplasia involves lateral and medial margins 2. Distal clitoris and left labia minora, excision - Invasive moderately differentiated squamous cell carcinoma (depth of invasion 4 mm), see synoptic summary - Differentiated vulvar intraepithelial neoplasia (Differentiated TATYANA) involves lateral, medial and deep margins  Incisional dehiscence was noted on postoperative exam. However, continued to granulate well.  Tumor Board Recommendation: Diagnosis: Recurrent vulvar cancer Recommendation: MRI with bone sequence to evaluate lesion on pubic ramus seen on PET/CT; Pending imaging, modified anterior radical vulvectomy with flap reconstruction  She saw Dr. Malone for discussion of further surgery.  11/2023- PET/CT- 1. Elongated activity localizing to both sides of the vulva likely a combination urine contamination and postsurgical process; please correlate with findings on direct examination. No abnormal activity in the uterus or cervix. No hypermetabolic pelvic sidewall or retroperitoneal nodes. 2. Healing fractures in the left superior pubic ramus and bilateral sacral alae with nonspecific patches of FDG activity, likely reflective of nonspecific bone healing process. 3. Bone changes in the left iliac bone including new lucent and sclerotic changes near the iliac crest with increased activity; although findings may also be posttraumatic in the background of posttherapy changes, malignancy is a concern in light of recent MRI findings. Consider tissue evaluation for more definitive assessment.  11/2023- Pelvic MRI- Increased marrow signal abnormality within the left iliac bone which extends into the left iliac crest and is suspicious for osseous metastatic disease. Multiple insufficiency fractures as above.  12/2023- BONE, ILIAC, LEFT, CT GUIDED CORE BIOPSY- NON DIAGNOSTIC Benign bone present  She returns for re-exam today.  Health Maintenance: Mammo- 8/2022 BIRADS2 Colonoscopy- 2015, normal (first colonoscopy had a tubular adenoma)

## 2024-03-19 NOTE — PHYSICAL EXAM
[NI] : Normal [de-identified] : External genitalia: Normal, no lesions appreciated. post-RT skin changes; symmetric post-granulation tissue noted anterior to urethra, unclear if disease present, no gross evidence carcinoma, <1cm gross margin to urethra.

## 2024-03-24 LAB
ANION GAP SERPL CALC-SCNC: 11 MMOL/L
BUN SERPL-MCNC: 23 MG/DL
CALCIUM SERPL-MCNC: 9.8 MG/DL
CHLORIDE SERPL-SCNC: 104 MMOL/L
CO2 SERPL-SCNC: 27 MMOL/L
CREAT SERPL-MCNC: 0.94 MG/DL
EGFR: 69 ML/MIN/1.73M2
GLUCOSE SERPL-MCNC: 97 MG/DL
HCT VFR BLD CALC: 38.8 %
HGB BLD-MCNC: 12.3 G/DL
MCHC RBC-ENTMCNC: 31.7 GM/DL
MCHC RBC-ENTMCNC: 32.6 PG
MCV RBC AUTO: 102.9 FL
PLATELET # BLD AUTO: 220 K/UL
POTASSIUM SERPL-SCNC: 4.5 MMOL/L
RBC # BLD: 3.77 M/UL
RBC # FLD: 12.8 %
SODIUM SERPL-SCNC: 142 MMOL/L
WBC # FLD AUTO: 3.34 K/UL

## 2024-04-22 ENCOUNTER — OUTPATIENT (OUTPATIENT)
Dept: OUTPATIENT SERVICES | Facility: HOSPITAL | Age: 64
LOS: 1 days | Discharge: HOME | End: 2024-04-22

## 2024-04-22 DIAGNOSIS — C51.9 MALIGNANT NEOPLASM OF VULVA, UNSPECIFIED: ICD-10-CM

## 2024-04-22 DIAGNOSIS — Z92.21 PERSONAL HISTORY OF ANTINEOPLASTIC CHEMOTHERAPY: Chronic | ICD-10-CM

## 2024-04-22 DIAGNOSIS — Z92.3 PERSONAL HISTORY OF IRRADIATION: Chronic | ICD-10-CM

## 2024-04-22 DIAGNOSIS — Z98.890 OTHER SPECIFIED POSTPROCEDURAL STATES: Chronic | ICD-10-CM

## 2024-04-26 ENCOUNTER — OUTPATIENT (OUTPATIENT)
Dept: OUTPATIENT SERVICES | Facility: HOSPITAL | Age: 64
LOS: 1 days | End: 2024-04-26

## 2024-04-26 VITALS
HEART RATE: 78 BPM | DIASTOLIC BLOOD PRESSURE: 78 MMHG | TEMPERATURE: 98 F | RESPIRATION RATE: 16 BRPM | SYSTOLIC BLOOD PRESSURE: 145 MMHG | WEIGHT: 130.95 LBS | HEIGHT: 62 IN | OXYGEN SATURATION: 98 %

## 2024-04-26 DIAGNOSIS — Z98.890 OTHER SPECIFIED POSTPROCEDURAL STATES: Chronic | ICD-10-CM

## 2024-04-26 DIAGNOSIS — C51.9 MALIGNANT NEOPLASM OF VULVA, UNSPECIFIED: ICD-10-CM

## 2024-04-26 DIAGNOSIS — D07.1 CARCINOMA IN SITU OF VULVA: ICD-10-CM

## 2024-04-26 DIAGNOSIS — Z92.3 PERSONAL HISTORY OF IRRADIATION: Chronic | ICD-10-CM

## 2024-04-26 DIAGNOSIS — Z92.21 PERSONAL HISTORY OF ANTINEOPLASTIC CHEMOTHERAPY: Chronic | ICD-10-CM

## 2024-04-26 DIAGNOSIS — Z90.79 ACQUIRED ABSENCE OF OTHER GENITAL ORGAN(S): Chronic | ICD-10-CM

## 2024-04-26 DIAGNOSIS — I10 ESSENTIAL (PRIMARY) HYPERTENSION: ICD-10-CM

## 2024-04-26 LAB
ANION GAP SERPL CALC-SCNC: 10 MMOL/L — SIGNIFICANT CHANGE UP (ref 7–14)
BLD GP AB SCN SERPL QL: NEGATIVE — SIGNIFICANT CHANGE UP
BUN SERPL-MCNC: 21 MG/DL — SIGNIFICANT CHANGE UP (ref 7–23)
CALCIUM SERPL-MCNC: 9 MG/DL — SIGNIFICANT CHANGE UP (ref 8.4–10.5)
CHLORIDE SERPL-SCNC: 105 MMOL/L — SIGNIFICANT CHANGE UP (ref 98–107)
CO2 SERPL-SCNC: 28 MMOL/L — SIGNIFICANT CHANGE UP (ref 22–31)
CREAT SERPL-MCNC: 0.88 MG/DL — SIGNIFICANT CHANGE UP (ref 0.5–1.3)
EGFR: 74 ML/MIN/1.73M2 — SIGNIFICANT CHANGE UP
GLUCOSE SERPL-MCNC: 98 MG/DL — SIGNIFICANT CHANGE UP (ref 70–99)
HCT VFR BLD CALC: 35.8 % — SIGNIFICANT CHANGE UP (ref 34.5–45)
HGB BLD-MCNC: 12.2 G/DL — SIGNIFICANT CHANGE UP (ref 11.5–15.5)
MCHC RBC-ENTMCNC: 33.3 PG — SIGNIFICANT CHANGE UP (ref 27–34)
MCHC RBC-ENTMCNC: 34.1 GM/DL — SIGNIFICANT CHANGE UP (ref 32–36)
MCV RBC AUTO: 97.8 FL — SIGNIFICANT CHANGE UP (ref 80–100)
NRBC # BLD: 0 /100 WBCS — SIGNIFICANT CHANGE UP (ref 0–0)
NRBC # FLD: 0 K/UL — SIGNIFICANT CHANGE UP (ref 0–0)
PLATELET # BLD AUTO: 198 K/UL — SIGNIFICANT CHANGE UP (ref 150–400)
POTASSIUM SERPL-MCNC: 4 MMOL/L — SIGNIFICANT CHANGE UP (ref 3.5–5.3)
POTASSIUM SERPL-SCNC: 4 MMOL/L — SIGNIFICANT CHANGE UP (ref 3.5–5.3)
RBC # BLD: 3.66 M/UL — LOW (ref 3.8–5.2)
RBC # FLD: 12.3 % — SIGNIFICANT CHANGE UP (ref 10.3–14.5)
RH IG SCN BLD-IMP: POSITIVE — SIGNIFICANT CHANGE UP
SODIUM SERPL-SCNC: 143 MMOL/L — SIGNIFICANT CHANGE UP (ref 135–145)
WBC # BLD: 4.21 K/UL — SIGNIFICANT CHANGE UP (ref 3.8–10.5)
WBC # FLD AUTO: 4.21 K/UL — SIGNIFICANT CHANGE UP (ref 3.8–10.5)

## 2024-04-26 RX ORDER — METOPROLOL TARTRATE 50 MG
1 TABLET ORAL
Refills: 0 | DISCHARGE

## 2024-04-26 NOTE — H&P PST ADULT - PROBLEM SELECTOR PLAN 2
Patient instructed to take metoprolol on day of procedure with small sips of water, verbalized understanding.

## 2024-04-26 NOTE — H&P PST ADULT - NSICDXPASTMEDICALHX_GEN_ALL_CORE_FT
PAST MEDICAL HISTORY:  Acid reflux     Arthritis     Carcinoma in situ of vulva     Celiac disease     Fracture of lumbar spine     Hiatal hernia     History of chemotherapy     HTN (hypertension)     Kidney stone     Osteopenia     S/P radiation therapy     UTI (urinary tract infection)     Vulvar cancer     Vulvar intraepithelial neoplasia (TATYANA) grade 3

## 2024-04-26 NOTE — H&P PST ADULT - PROBLEM SELECTOR PLAN 1
Patient scheduled for surgery on: 5/6/204  Provided with verbal and written presurgical instructions  Verbalized understanding  with teach back on the following: Famotidine for GI prophylaxis and chlorhexidine wash    Lab specimen drawn at PST today: cbc, bmp, type

## 2024-04-26 NOTE — H&P PST ADULT - GENITOURINARY COMMENTS
preop dx of carcinoma in situ of vulva. see HPI, reports occasional pulling sensation across pelvis Declined

## 2024-04-26 NOTE — H&P PST ADULT - NSICDXFAMILYHX_GEN_ALL_CORE_FT
FAMILY HISTORY:  Father  Still living? No  FH: heart disease, Age at diagnosis: Age Unknown  FH: lupus erythematosus, Age at diagnosis: Age Unknown    Mother  Still living? Unknown  FH: arrhythmia, Age at diagnosis: Age Unknown

## 2024-04-26 NOTE — H&P PST ADULT - RESPIRATORY
normal/clear to auscultation bilaterally/no wheezes/no respiratory distress/no use of accessory muscles/breath sounds equal/respirations non-labored

## 2024-04-26 NOTE — H&P PST ADULT - NSICDXPASTSURGICALHX_GEN_ALL_CORE_FT
PAST SURGICAL HISTORY:  H/O colonoscopy     History of bone marrow biopsy     History of D&C     History of vulvectomy     S/P biopsy     S/P endometrial ablation

## 2024-04-26 NOTE — H&P PST ADULT - NEGATIVE NEUROLOGICAL SYMPTOMS
no weakness/no generalized seizures/no focal seizures/no difficulty walking/no headache/no hemiparesis

## 2024-05-03 PROBLEM — Z92.3 PERSONAL HISTORY OF IRRADIATION: Chronic | Status: ACTIVE | Noted: 2024-04-26

## 2024-05-03 NOTE — ASU PATIENT PROFILE, ADULT - ABILITY TO HEAR (WITH HEARING AID OR HEARING APPLIANCE IF NORMALLY USED):
[facility]    [address]    [place] [address]    Phone:  [phone]       Thank You for choosing us for your health care visit. We are glad to serve you and happy to provide you with this summary of your visit. Please help us to ensure we have accurate records. If you find anything that needs to be changed, please let our staff know as soon as possible.          Your Demographic Information     Patient Name Sex     Doris Rivera Female 1948       Ethnic Group Patient Race    Not of  or  Origin White      Your Visit Details     Date & Time Provider Department    2017 11:30 AM Laura Zee, CH[facility]ST[facility] [facility]      Your Upcoming Appointment*(Max 10)     2017  7:10 AM CDT   Fasting Lab with [facility]   [facility] [place] [facility] ([facility] [place])    [address]  [place] [address]   [phone]            2017 10:40 AM CST   Office Visit with [facility] [place] ([facility])    [address]  [facility] [place] [address]   [phone]            Tuesday May 15, 2018 10:00 AM CDT   Medicare Well Visit with SOLED[facility] [place] ([facility])    [address]  [facility] [place] [address]   [phone]            2018 10:30 AM CDT   Complete Ophthalmology Exam with CH[facility]STIAN[facility] ([facility] [place])    [address]  [place] [address]   [phone]              Your To Do List     Follow-Up    Return in about 1 year (around 2018) for diabetic eye exam.      Conditions Discussed Today or Order-Related Diagnoses        Comments    Diabetes mellitus type 2 without retinopathy (CMS/HCC)    -  Primary     NS  (nuclear sclerosis), bilateral           Your Vitals Were     BP Smoking Status                118/82 (BP Location: [facility], Patient Position: Sitting, Cuff Size: Regular) Never Smoker          Medications Prescribed or Re-Ordered Today     None      Your Current Medications Are        Disp Refills Start End    MAGNESIUM PO        Sig - Route: Take by mouth daily. - Oral    Class: Historical Med    lisinopril (ZESTRIL) 20 MG tablet 90 tablet 1 5/17/2017     Sig: TAKE 1 TABLET BY MOUTH DAILY.    Class: Eprescribe    simvastatin (ZOCOR) 20 MG tablet 90 tablet 1 5/17/2017     Sig: TAKE 1 TABLET BY MOUTH ONCE DAILY    Class: Eprescribe    metformin (GLUCOPHAGE-XR) 500 MG 24 hr tablet 90 tablet 1 5/17/2017     Sig: TAKE 1 TABLET BY MOUTH ONCE DAILY IN THE MORNING WITH BREAKFAST    Class: Eprescribe    MILK THISTLE PO        Sig - Route: Take 1 tablet by mouth daily. - Oral    Class: Historical Med    Multiple Vitamins-Minerals (WOMENS 50+ MULTI VITAMIN/MIN PO)        Sig - Route: Take 1 tablet by mouth daily. - Oral    Class: Historical Med    CALCIUM CITRATE-VITAMIN D PO        Sig - Route: Take 1,200 mg by mouth daily. - Oral    Class: Historical Med      Allergies     No Known Allergies      Immunizations History as of 6/8/2017     Name Date    Influenza 11/21/2015, 10/26/2014, 10/8/2013  7:50 AM    Influenza High Dose Pres Free 10/11/2016  9:16 AM    Pneumococcal Conjugate 13 Valent 10/11/2016  9:17 AM    Pneumococcal Polysaccharide Adult 3/31/2015  9:19 AM    Tdap 12/2/2015, 3/1/2005      Problem List as of 6/8/2017     Type 2 diabetes mellitus without complication (CMS/HCC)    Hyperlipidemia    Hypertension    Femoral hernia    Menopause    Prediabetes    Essential hypertension, benign    Other and unspecified hyperlipidemia    Glaucoma suspect of both eyes            Patient Instructions     None       Adequate: hears normal conversation without difficulty

## 2024-05-03 NOTE — ASU PATIENT PROFILE, ADULT - FALL HARM RISK - HARM RISK INTERVENTIONS

## 2024-05-05 ENCOUNTER — TRANSCRIPTION ENCOUNTER (OUTPATIENT)
Age: 64
End: 2024-05-05

## 2024-05-06 ENCOUNTER — TRANSCRIPTION ENCOUNTER (OUTPATIENT)
Age: 64
End: 2024-05-06

## 2024-05-06 ENCOUNTER — RESULT REVIEW (OUTPATIENT)
Age: 64
End: 2024-05-06

## 2024-05-06 ENCOUNTER — INPATIENT (INPATIENT)
Facility: HOSPITAL | Age: 64
LOS: 2 days | Discharge: HOME | End: 2024-05-09
Attending: OBSTETRICS & GYNECOLOGY | Admitting: PLASTIC SURGERY
Payer: COMMERCIAL

## 2024-05-06 ENCOUNTER — APPOINTMENT (OUTPATIENT)
Dept: PLASTIC SURGERY | Facility: HOSPITAL | Age: 64
End: 2024-05-06
Payer: COMMERCIAL

## 2024-05-06 ENCOUNTER — APPOINTMENT (OUTPATIENT)
Dept: GYNECOLOGIC ONCOLOGY | Facility: HOSPITAL | Age: 64
End: 2024-05-06

## 2024-05-06 VITALS
WEIGHT: 130.95 LBS | OXYGEN SATURATION: 100 % | SYSTOLIC BLOOD PRESSURE: 149 MMHG | HEART RATE: 67 BPM | HEIGHT: 62 IN | RESPIRATION RATE: 16 BRPM | TEMPERATURE: 99 F | DIASTOLIC BLOOD PRESSURE: 70 MMHG

## 2024-05-06 DIAGNOSIS — Z98.890 OTHER SPECIFIED POSTPROCEDURAL STATES: Chronic | ICD-10-CM

## 2024-05-06 DIAGNOSIS — Z90.79 ACQUIRED ABSENCE OF OTHER GENITAL ORGAN(S): Chronic | ICD-10-CM

## 2024-05-06 DIAGNOSIS — C51.9 MALIGNANT NEOPLASM OF VULVA, UNSPECIFIED: ICD-10-CM

## 2024-05-06 PROBLEM — N39.0 URINARY TRACT INFECTION, SITE NOT SPECIFIED: Chronic | Status: ACTIVE | Noted: 2024-04-26

## 2024-05-06 PROBLEM — N20.0 CALCULUS OF KIDNEY: Chronic | Status: ACTIVE | Noted: 2024-04-26

## 2024-05-06 PROBLEM — Z92.21 PERSONAL HISTORY OF ANTINEOPLASTIC CHEMOTHERAPY: Chronic | Status: ACTIVE | Noted: 2024-04-26

## 2024-05-06 PROCEDURE — 88309 TISSUE EXAM BY PATHOLOGIST: CPT | Mod: 26

## 2024-05-06 PROCEDURE — 15734 MUSCLE-SKIN GRAFT TRUNK: CPT

## 2024-05-06 PROCEDURE — 88341 IMHCHEM/IMCYTCHM EA ADD ANTB: CPT | Mod: 26

## 2024-05-06 PROCEDURE — 56633 VULVECTOMY RADICAL COMPLETE: CPT

## 2024-05-06 PROCEDURE — 88342 IMHCHEM/IMCYTCHM 1ST ANTB: CPT | Mod: 26

## 2024-05-06 PROCEDURE — 88365 INSITU HYBRIDIZATION (FISH): CPT | Mod: 26

## 2024-05-06 RX ORDER — SIMETHICONE 80 MG/1
80 TABLET, CHEWABLE ORAL EVERY 6 HOURS
Refills: 0 | Status: DISCONTINUED | OUTPATIENT
Start: 2024-05-06 | End: 2024-05-09

## 2024-05-06 RX ORDER — METOPROLOL TARTRATE 50 MG
1 TABLET ORAL
Refills: 0 | DISCHARGE

## 2024-05-06 RX ORDER — SENNA PLUS 8.6 MG/1
1 TABLET ORAL AT BEDTIME
Refills: 0 | Status: DISCONTINUED | OUTPATIENT
Start: 2024-05-06 | End: 2024-05-09

## 2024-05-06 RX ORDER — ACETAMINOPHEN 500 MG
1000 TABLET ORAL ONCE
Refills: 0 | Status: DISCONTINUED | OUTPATIENT
Start: 2024-05-07 | End: 2024-05-07

## 2024-05-06 RX ORDER — KETOROLAC TROMETHAMINE 30 MG/ML
30 SYRINGE (ML) INJECTION EVERY 8 HOURS
Refills: 0 | Status: DISCONTINUED | OUTPATIENT
Start: 2024-05-06 | End: 2024-05-07

## 2024-05-06 RX ORDER — HEPARIN SODIUM 5000 [USP'U]/ML
5000 INJECTION INTRAVENOUS; SUBCUTANEOUS EVERY 8 HOURS
Refills: 0 | Status: DISCONTINUED | OUTPATIENT
Start: 2024-05-06 | End: 2024-05-09

## 2024-05-06 RX ORDER — MELOXICAM 15 MG/1
1 TABLET ORAL
Refills: 0 | DISCHARGE

## 2024-05-06 RX ORDER — OXYCODONE HYDROCHLORIDE 5 MG/1
5 TABLET ORAL
Refills: 0 | Status: DISCONTINUED | OUTPATIENT
Start: 2024-05-06 | End: 2024-05-09

## 2024-05-06 RX ORDER — GENTAMICIN SULFATE 0.1 %
1 OINTMENT (GRAM) TOPICAL EVERY 12 HOURS
Refills: 0 | Status: DISCONTINUED | OUTPATIENT
Start: 2024-05-06 | End: 2024-05-09

## 2024-05-06 RX ORDER — SODIUM CHLORIDE 9 MG/ML
1000 INJECTION, SOLUTION INTRAVENOUS
Refills: 0 | Status: DISCONTINUED | OUTPATIENT
Start: 2024-05-06 | End: 2024-05-07

## 2024-05-06 RX ORDER — ACETAMINOPHEN 500 MG
1000 TABLET ORAL ONCE
Refills: 0 | Status: COMPLETED | OUTPATIENT
Start: 2024-05-07 | End: 2024-05-07

## 2024-05-06 RX ORDER — METOPROLOL TARTRATE 50 MG
5 TABLET ORAL EVERY 6 HOURS
Refills: 0 | Status: DISCONTINUED | OUTPATIENT
Start: 2024-05-06 | End: 2024-05-09

## 2024-05-06 RX ORDER — ONDANSETRON 8 MG/1
8 TABLET, FILM COATED ORAL EVERY 8 HOURS
Refills: 0 | Status: DISCONTINUED | OUTPATIENT
Start: 2024-05-06 | End: 2024-05-09

## 2024-05-06 RX ADMIN — HEPARIN SODIUM 5000 UNIT(S): 5000 INJECTION INTRAVENOUS; SUBCUTANEOUS at 22:29

## 2024-05-06 RX ADMIN — Medication 30 MILLIGRAM(S): at 23:29

## 2024-05-06 RX ADMIN — Medication 30 MILLIGRAM(S): at 22:29

## 2024-05-06 NOTE — BRIEF OPERATIVE NOTE - NSICDXBRIEFPREOP_GEN_ALL_CORE_FT
PRE-OP DIAGNOSIS:  Recurrent vulvar cancer 06-May-2024 17:19:17  Eboni Seo  
PRE-OP DIAGNOSIS:  Recurrent vulvar cancer 06-May-2024 17:19:17  Eboni Seo

## 2024-05-06 NOTE — DISCHARGE NOTE PROVIDER - NSDCFUADDINST_GEN_ALL_CORE_FT
Alternate Tylenol 975mg q6 hrs and Motrin 600mg q6hrs so that you are taking pain medication every 3 hrs. Take oxycodone for breakthrough pain. If heavy bleeding from the surgical site, foul smelling discharge, fevers, or pain not controlled with oral pain meds, please contact your provider or go to the emergency room.  Nothing in the vagina until cleared by MD. Soak in Sitz baths a few times a day. Follow up with Dr. Jane and plastic surgery as detailed above.

## 2024-05-06 NOTE — DISCHARGE NOTE PROVIDER - HOSPITAL COURSE
Patient underwent an uncomplicated partial radical vulvectomy and flap reconstruction in a combined case with plastic surgery.     On POD#1 patient was tolerating po, making adequate urine and pain was well controlled. Patient continued on bedrest  On POD#2 patient was ambulating via shuffling per PRS recommendation.     On POD# XXX patient was discharged home in stable condition with normal vital signs. Patient discharged with Hutchins catheter and two Leo drains. She was advised to follow up with Dr. Jane and Dr. Malone in the office. Patient underwent an uncomplicated partial radical vulvectomy and flap reconstruction in a combined case with plastic surgery.     On POD#1 patient was tolerating po, making adequate urine and pain was well controlled. Patient continued on bedrest per plastics recommendations  On POD#2 patient was ambulating via shuffling per PRS recommendation.     On POD# 3 patient was discharged home in stable condition with normal vital signs. Both BHARATI drains removed by plastics. Patient discharged with Hutchins catheter. Leg bag teaching completed. Patient continues to ambulate as instructed.  She will follow up with Dr. Jane and Dr. Malone in the office.               10.6   5.69  )-----------( 169      ( 05-08 @ 06:03 )             31.4                10.2   6.71  )-----------( 155      ( 05-07 @ 04:00 )             30.4

## 2024-05-06 NOTE — DISCHARGE NOTE PROVIDER - NSDCMRMEDTOKEN_GEN_ALL_CORE_FT
cyclobenzaprine 5 mg oral tablet: 1 tab(s) orally prn as needed for  moderate pain  Folic Acid, 1 mg, PO, Daily, PM:   meloxicam 7.5 mg oral tablet: 1 tab(s) orally prn as needed for  moderate pain last dose 3/26/24  metoprolol succinate 25 mg oral capsule, extended release: 1 cap(s) orally once a day am  Vitamin B 12, 1000Mcg,PO, Daily, PM:   Vitamin D 3, 50 mcg, PO, Daily:    acetaminophen 325 mg oral tablet: 3 tab(s) orally every 6 hours  cyclobenzaprine 5 mg oral tablet: 1 tab(s) orally prn as needed for  moderate pain  Folic Acid, 1 mg, PO, Daily, PM:   gentamicin 0.1% topical ointment: Apply topically to affected area 2 times a day as needed for  wound care Please apply twice a day to incision.  ibuprofen 600 mg oral tablet: 1 tab(s) orally every 6 hours  meloxicam 7.5 mg oral tablet: 1 tab(s) orally prn as needed for  moderate pain last dose 3/26/24  metoprolol succinate 25 mg oral capsule, extended release: 1 cap(s) orally once a day am  oxyCODONE 5 mg oral tablet: 1 tab(s) orally every 6 hours as needed for  severe pain MDD: 4  Vitamin B 12, 1000Mcg,PO, Daily, PM:   Vitamin D 3, 50 mcg, PO, Daily:

## 2024-05-06 NOTE — ASU PREOP CHECKLIST - 1.
Last radiation therapy/Chemo therapy 10/2022 Last radiation therapy/Chemo therapy 10/2022, Dental implants....PACU locker 5

## 2024-05-06 NOTE — BRIEF OPERATIVE NOTE - COMMENTS
perineal incisions with prineo/dermabond  bacitracin applied to vulvar area  perineal drains x2  Hutchins in place  bedrest x1 day

## 2024-05-06 NOTE — BRIEF OPERATIVE NOTE - NSICDXBRIEFPROCEDURE_GEN_ALL_CORE_FT
PROCEDURES:  Partial radical vulvectomy 06-May-2024 17:18:58  Eboni Seo  
PROCEDURES:  Partial radical vulvectomy 06-May-2024 17:18:58  Eboni Seo

## 2024-05-06 NOTE — BRIEF OPERATIVE NOTE - OPERATION/FINDINGS
EUA: bilateral erythematous, atrophic vulvar tissue s/p radiation. 1.5cm promise-clitoral yellow-white mass. Posterior vulva and vaginal introitus without evidence of disease.  Hutchins catheter draining yellow urine throughout case.  Fasciocutaneous flap reconstruction by PRS. EUA: bilateral erythematous, atrophic vulvar tissue s/p radiation. 1.5cm promise-clitoral yellow-white mass. Posterior vulva and vaginal introitus without evidence of disease. Partial radical vulvectomy performed with 1cm margin around urethra  Hutchins catheter draining yellow urine throughout case.  Gluteal flap reconstruction by PRS. EUA: bilateral erythematous, atrophic vulvar tissue s/p radiation. 1.5cm promise-clitoral yellow-white mass. Posterior vulva and vaginal introitus without evidence of disease. Partial radical vulvectomy performed with 1cm margin around urethra  Hutchins catheter draining yellow urine throughout case.  Vulvar reconstruction using gluteal flap by PRS.

## 2024-05-06 NOTE — PATIENT PROFILE ADULT - NSPRESCRALCSCORE_GEN_A_NUR_CAL
Airway  Urgency: elective    Date/Time: 3/10/2017 1:16 PM  Airway not difficult    General Information and Staff    Patient location during procedure: OR  CRNA: JAZMINE JOHNS    Indications and Patient Condition  Indications for airway management: airway protection    Preoxygenated: yes  MILS not maintained throughout  Mask difficulty assessment: 1 - vent by mask    Final Airway Details  Final airway type: endotracheal airway      Successful airway: ETT  Cuffed: yes   Successful intubation technique: direct laryngoscopy  Endotracheal tube insertion site: oral  Blade: Alvin  Blade size: #3  ETT size: 7.0 mm  Cormack-Lehane Classification: grade I - full view of glottis  Placement verified by: chest auscultation and capnometry   Measured from: lips  ETT to lips (cm): 22  Number of attempts at approach: 1    Additional Comments  PreO2 100%, FEO2 >85, SIVI, easy BMV, sniff position, ETT placed/ confirmed, attraumatic, teeth and lips as preop.            
Pt's sister Eliazar Floyd is at bedside to visit pt. She will be the person taking pt home when he is discharged.  Her number is [phone]
1

## 2024-05-06 NOTE — PATIENT PROFILE ADULT - FALL HARM RISK - HARM RISK INTERVENTIONS

## 2024-05-06 NOTE — DISCHARGE NOTE PROVIDER - CARE PROVIDER_API CALL
Tiera Jane  Gynecologic Oncology  [address] [place] [address]  Phone: [phone]  Fax: [phone]  Scheduled Appointment: 05/23/2024 10:00 AM

## 2024-05-06 NOTE — DISCHARGE NOTE PROVIDER - NSRESEARCHGRANT_HIDDEN_GEN_A_CORE
This is a recent snapshot of the patient's [facility] medical record.  For current drug dose and complete information and questions, call [phone] or In Basket pool, [facility] ([id number])  CSN Number:  [id number]      
Yes

## 2024-05-06 NOTE — DISCHARGE NOTE PROVIDER - NSDCFUSCHEDAPPT_GEN_ALL_CORE_FT
Tiera Jane  [facility] Physician [facility]  [facility] [address]  Scheduled Appointment: 05/23/2024    Fanta Sanders  [facility] Physician [facility]  [facility]  Scheduled Appointment: 07/16/2024    Carol Carreon  [facility] Physician [facility]  [facility] [address] [facility]  Scheduled Appointment: 07/18/2024     Will Malone  [facility]  PLAST[facility] [address] [facility]  Scheduled Appointment: 05/17/2024    Tiera Jane  [facility] Physician [facility]  [facility] [address]  Scheduled Appointment: 05/23/2024    Fanta Sanders  [facility]ician [facility]  [facility]  Scheduled Appointment: 07/16/2024    Carol Carreon  [facility]  [facility] [address] [facility]  Scheduled Appointment: 07/18/2024

## 2024-05-06 NOTE — BRIEF OPERATIVE NOTE - NSICDXBRIEFOPLAUNCH_GEN_ALL_CORE
<--- Click to Launch ICDx for PreOp, PostOp and Procedure
<--- Click to Launch ICDx for PreOp, PostOp and Procedure
No

## 2024-05-06 NOTE — CHART NOTE - NSCHARTNOTEFT_GEN_A_CORE
POST-OP CHECK NOTE    SUBJECTIVE:    62yo F now POD0 s/p partial radical vulvectomy with vulvar reconstruction with gluteal fold v-to-y advancement flap. Pain controlled. Not OOB.     Pain controlled by pain meds.  She is not yet out of bed. Tejada catheter in place/Patient has yet to void. Tolerating sips of clear liquids w/o n/v (Patient yet to take anything by mouth). Denies fevers, chills, n/v, chest pain, or shortness of breath     gentamicin 0.1% Ointment 1 Application(s) Topical every 12 hours  heparin   Injectable 5000 Unit(s) SubCutaneous every 8 hours  ketorolac   Injectable 30 milliGRAM(s) IV Push every 8 hours  lactated ringers. 1000 milliLiter(s) IV Continuous <Continuous>  metoprolol tartrate Injectable 5 milliGRAM(s) IV Push every 6 hours PRN  ondansetron    Tablet 8 milliGRAM(s) Oral every 8 hours PRN  oxyCODONE    IR 5 milliGRAM(s) Oral every 3 hours PRN  senna 1 Tablet(s) Oral at bedtime PRN  simethicone 80 milliGRAM(s) Chew every 6 hours PRN      OBJECTIVE:    VITAL SIGNS:  Vital Signs Last 24 Hrs  T(C): 36.5 (06 May 2024 20:00), Max: 37 (06 May 2024 11:10)  T(F): 97.7 (06 May 2024 20:00), Max: 98.6 (06 May 2024 11:10)  HR: 67 (06 May 2024 20:30) (59 - 76)  BP: 152/66 (06 May 2024 20:30) (115/57 - 166/75)  BP(mean): 84 (06 May 2024 20:30) (84 - 99)  RR: 20 (06 May 2024 20:30) (12 - 20)  SpO2: 100% (06 May 2024 20:30) (94% - 100%)    Parameters below as of 06 May 2024 20:30  Patient On (Oxygen Delivery Method): room air      CAPILLARY BLOOD GLUCOSE          05-06-24 @ 07:01  -  05-06-24 @ 20:51  --------------------------------------------------------  IN: 450 mL / OUT: 75 mL / NET: 375 mL        PHYSICAL EXAM:  GEN: No acute distress. Awake. Alert   CV: Regular rate and rhythm on bedside monitor   LUNGS: Unlabored breathing. No respiratory distress  ABD: Soft. Non-tender. Non-distended   Incision: Laproscopic sites c/d/i w/ overlying dressing   EXT: No calf tenderness bilaterally    LABS:            ASSESSMENT:  62yo F now POD0 s/p  Patient is stable and progressing postoperatively.    NEURO: Pain controlled on current regimen  CV: Hemodynamically stable   PULM: Saturating well on RA. No acute issues   GI: Advance diet as tolerated. Zofran PRN  : Continue tejada/ Due to void   HEME: SCDs. Early ambulation   ID: Afebrile  Dispo: Home once meeting post-operatively milestones     Deepak Sprague, PGY-2  Obstetrics and Gynecology    ***Incomplete note*** POST-OP CHECK NOTE    SUBJECTIVE:    64yo F now POD0 s/p partial radical vulvectomy with vulvar reconstruction with gluteal fold v-to-y advancement flap. Pain controlled. Not OOB. Tejada catheter in place. Tolerating sips of clear liquids w/o n/v. Denies fevers, chills, n/v, chest pain, or shortness of breath     gentamicin 0.1% Ointment 1 Application(s) Topical every 12 hours  heparin   Injectable 5000 Unit(s) SubCutaneous every 8 hours  ketorolac   Injectable 30 milliGRAM(s) IV Push every 8 hours  lactated ringers. 1000 milliLiter(s) IV Continuous <Continuous>  metoprolol tartrate Injectable 5 milliGRAM(s) IV Push every 6 hours PRN  ondansetron    Tablet 8 milliGRAM(s) Oral every 8 hours PRN  oxyCODONE    IR 5 milliGRAM(s) Oral every 3 hours PRN  senna 1 Tablet(s) Oral at bedtime PRN  simethicone 80 milliGRAM(s) Chew every 6 hours PRN      OBJECTIVE:    VITAL SIGNS:  Vital Signs Last 24 Hrs  T(C): 36.5 (06 May 2024 20:00), Max: 37 (06 May 2024 11:10)  T(F): 97.7 (06 May 2024 20:00), Max: 98.6 (06 May 2024 11:10)  HR: 67 (06 May 2024 20:30) (59 - 76)  BP: 152/66 (06 May 2024 20:30) (115/57 - 166/75)  BP(mean): 84 (06 May 2024 20:30) (84 - 99)  RR: 20 (06 May 2024 20:30) (12 - 20)  SpO2: 100% (06 May 2024 20:30) (94% - 100%)    Parameters below as of 06 May 2024 20:30  Patient On (Oxygen Delivery Method): room air      CAPILLARY BLOOD GLUCOSE          05-06-24 @ 07:01  -  05-06-24 @ 20:51  --------------------------------------------------------  IN: 450 mL / OUT: 75 mL / NET: 375 mL        PHYSICAL EXAM:  GEN: No acute distress. Awake. Alert   CV: Regular rate and rhythm on bedside monitor   LUNGS: Unlabored breathing. No respiratory distress  ABD: Soft. Non-tender. Non-distended   -Rt drain w/ ~10-15 serosanguinous output  -Lt drain w/ scant serosanguinous output   EXT: No calf tenderness bilaterally    LABS:            ASSESSMENT:  64yo F now POD0 s/p partial radical vulvectomy with vulvar reconstruction with gluteal fold v-to-y advancement flap. Patient is stable and progressing postoperatively.    NEURO: Pain controlled on current regimen  - Acetaminophen, Toradol, and Oxy PRN  CV: Hemodynamically stable   - For AM CBC  PULM: Saturating well on RA. No acute issues   GI: Advance diet as tolerated. Zofran, Senna, Simethicone PRN  : Continue tejada  HEME: SCDs, HSQ  - For strict bedrest until POD#2  ID: Afebrile  - Gentamicin to be applied BID to sites   Dispo: For floor     Deepak Sprague, PGY-2  Obstetrics and Gynecology

## 2024-05-06 NOTE — DISCHARGE NOTE PROVIDER - NSDCCPCAREPLAN_GEN_ALL_CORE_FT
PRINCIPAL DISCHARGE DIAGNOSIS  Diagnosis: Recurrent vulvar cancer  Assessment and Plan of Treatment:

## 2024-05-07 DIAGNOSIS — Z98.890 OTHER SPECIFIED POSTPROCEDURAL STATES: ICD-10-CM

## 2024-05-07 LAB
ANION GAP SERPL CALC-SCNC: 12 MMOL/L — SIGNIFICANT CHANGE UP (ref 7–14)
BASOPHILS # BLD AUTO: 0.02 K/UL — SIGNIFICANT CHANGE UP (ref 0–0.2)
BASOPHILS NFR BLD AUTO: 0.3 % — SIGNIFICANT CHANGE UP (ref 0–2)
BUN SERPL-MCNC: 16 MG/DL — SIGNIFICANT CHANGE UP (ref 7–23)
CALCIUM SERPL-MCNC: 8.1 MG/DL — LOW (ref 8.4–10.5)
CHLORIDE SERPL-SCNC: 106 MMOL/L — SIGNIFICANT CHANGE UP (ref 98–107)
CO2 SERPL-SCNC: 23 MMOL/L — SIGNIFICANT CHANGE UP (ref 22–31)
CREAT SERPL-MCNC: 0.8 MG/DL — SIGNIFICANT CHANGE UP (ref 0.5–1.3)
EGFR: 83 ML/MIN/1.73M2 — SIGNIFICANT CHANGE UP
EOSINOPHIL # BLD AUTO: 0 K/UL — SIGNIFICANT CHANGE UP (ref 0–0.5)
EOSINOPHIL NFR BLD AUTO: 0 % — SIGNIFICANT CHANGE UP (ref 0–6)
GLUCOSE SERPL-MCNC: 104 MG/DL — HIGH (ref 70–99)
HCT VFR BLD CALC: 30.4 % — LOW (ref 34.5–45)
HGB BLD-MCNC: 10.2 G/DL — LOW (ref 11.5–15.5)
IANC: 5.31 K/UL — SIGNIFICANT CHANGE UP (ref 1.8–7.4)
IMM GRANULOCYTES NFR BLD AUTO: 0.3 % — SIGNIFICANT CHANGE UP (ref 0–0.9)
LYMPHOCYTES # BLD AUTO: 0.89 K/UL — LOW (ref 1–3.3)
LYMPHOCYTES # BLD AUTO: 13.3 % — SIGNIFICANT CHANGE UP (ref 13–44)
MAGNESIUM SERPL-MCNC: 1.7 MG/DL — SIGNIFICANT CHANGE UP (ref 1.6–2.6)
MCHC RBC-ENTMCNC: 33.3 PG — SIGNIFICANT CHANGE UP (ref 27–34)
MCHC RBC-ENTMCNC: 33.6 GM/DL — SIGNIFICANT CHANGE UP (ref 32–36)
MCV RBC AUTO: 99.3 FL — SIGNIFICANT CHANGE UP (ref 80–100)
MONOCYTES # BLD AUTO: 0.47 K/UL — SIGNIFICANT CHANGE UP (ref 0–0.9)
MONOCYTES NFR BLD AUTO: 7 % — SIGNIFICANT CHANGE UP (ref 2–14)
NEUTROPHILS # BLD AUTO: 5.31 K/UL — SIGNIFICANT CHANGE UP (ref 1.8–7.4)
NEUTROPHILS NFR BLD AUTO: 79.1 % — HIGH (ref 43–77)
NRBC # BLD: 0 /100 WBCS — SIGNIFICANT CHANGE UP (ref 0–0)
NRBC # FLD: 0 K/UL — SIGNIFICANT CHANGE UP (ref 0–0)
PHOSPHATE SERPL-MCNC: 3.6 MG/DL — SIGNIFICANT CHANGE UP (ref 2.5–4.5)
PLATELET # BLD AUTO: 155 K/UL — SIGNIFICANT CHANGE UP (ref 150–400)
POTASSIUM SERPL-MCNC: 4.7 MMOL/L — SIGNIFICANT CHANGE UP (ref 3.5–5.3)
POTASSIUM SERPL-SCNC: 4.7 MMOL/L — SIGNIFICANT CHANGE UP (ref 3.5–5.3)
RBC # BLD: 3.06 M/UL — LOW (ref 3.8–5.2)
RBC # FLD: 12.2 % — SIGNIFICANT CHANGE UP (ref 10.3–14.5)
SODIUM SERPL-SCNC: 141 MMOL/L — SIGNIFICANT CHANGE UP (ref 135–145)
WBC # BLD: 6.71 K/UL — SIGNIFICANT CHANGE UP (ref 3.8–10.5)
WBC # FLD AUTO: 6.71 K/UL — SIGNIFICANT CHANGE UP (ref 3.8–10.5)

## 2024-05-07 RX ORDER — MAGNESIUM OXIDE 400 MG ORAL TABLET 241.3 MG
400 TABLET ORAL ONCE
Refills: 0 | Status: COMPLETED | OUTPATIENT
Start: 2024-05-07 | End: 2024-05-07

## 2024-05-07 RX ORDER — ACETAMINOPHEN 500 MG
975 TABLET ORAL EVERY 6 HOURS
Refills: 0 | Status: DISCONTINUED | OUTPATIENT
Start: 2024-05-07 | End: 2024-05-09

## 2024-05-07 RX ORDER — SODIUM CHLORIDE 9 MG/ML
3 INJECTION INTRAMUSCULAR; INTRAVENOUS; SUBCUTANEOUS EVERY 8 HOURS
Refills: 0 | Status: DISCONTINUED | OUTPATIENT
Start: 2024-05-07 | End: 2024-05-09

## 2024-05-07 RX ORDER — CALCIUM CARBONATE 500(1250)
1 TABLET ORAL ONCE
Refills: 0 | Status: COMPLETED | OUTPATIENT
Start: 2024-05-07 | End: 2024-05-07

## 2024-05-07 RX ORDER — IBUPROFEN 200 MG
600 TABLET ORAL EVERY 6 HOURS
Refills: 0 | Status: DISCONTINUED | OUTPATIENT
Start: 2024-05-07 | End: 2024-05-09

## 2024-05-07 RX ADMIN — Medication 1000 MILLIGRAM(S): at 06:58

## 2024-05-07 RX ADMIN — Medication 400 MILLIGRAM(S): at 05:58

## 2024-05-07 RX ADMIN — Medication 1 APPLICATION(S): at 05:59

## 2024-05-07 RX ADMIN — Medication 400 MILLIGRAM(S): at 00:05

## 2024-05-07 RX ADMIN — SODIUM CHLORIDE 3 MILLILITER(S): 9 INJECTION INTRAMUSCULAR; INTRAVENOUS; SUBCUTANEOUS at 13:57

## 2024-05-07 RX ADMIN — Medication 975 MILLIGRAM(S): at 13:30

## 2024-05-07 RX ADMIN — Medication 975 MILLIGRAM(S): at 12:32

## 2024-05-07 RX ADMIN — SODIUM CHLORIDE 3 MILLILITER(S): 9 INJECTION INTRAMUSCULAR; INTRAVENOUS; SUBCUTANEOUS at 23:03

## 2024-05-07 RX ADMIN — HEPARIN SODIUM 5000 UNIT(S): 5000 INJECTION INTRAVENOUS; SUBCUTANEOUS at 21:52

## 2024-05-07 RX ADMIN — Medication 30 MILLIGRAM(S): at 05:59

## 2024-05-07 RX ADMIN — Medication 975 MILLIGRAM(S): at 19:15

## 2024-05-07 RX ADMIN — Medication 600 MILLIGRAM(S): at 14:21

## 2024-05-07 RX ADMIN — Medication 600 MILLIGRAM(S): at 21:52

## 2024-05-07 RX ADMIN — Medication 1 TABLET(S): at 07:44

## 2024-05-07 RX ADMIN — Medication 975 MILLIGRAM(S): at 18:18

## 2024-05-07 RX ADMIN — Medication 1 APPLICATION(S): at 18:19

## 2024-05-07 RX ADMIN — Medication 30 MILLIGRAM(S): at 06:58

## 2024-05-07 RX ADMIN — MAGNESIUM OXIDE 400 MG ORAL TABLET 400 MILLIGRAM(S): 241.3 TABLET ORAL at 06:35

## 2024-05-07 RX ADMIN — SODIUM CHLORIDE 125 MILLILITER(S): 9 INJECTION, SOLUTION INTRAVENOUS at 05:58

## 2024-05-07 RX ADMIN — SIMETHICONE 80 MILLIGRAM(S): 80 TABLET, CHEWABLE ORAL at 18:19

## 2024-05-07 RX ADMIN — Medication 600 MILLIGRAM(S): at 15:15

## 2024-05-07 RX ADMIN — Medication 600 MILLIGRAM(S): at 23:03

## 2024-05-07 RX ADMIN — Medication 1000 MILLIGRAM(S): at 01:05

## 2024-05-07 RX ADMIN — SODIUM CHLORIDE 125 MILLILITER(S): 9 INJECTION, SOLUTION INTRAVENOUS at 00:05

## 2024-05-07 RX ADMIN — HEPARIN SODIUM 5000 UNIT(S): 5000 INJECTION INTRAVENOUS; SUBCUTANEOUS at 05:59

## 2024-05-07 RX ADMIN — HEPARIN SODIUM 5000 UNIT(S): 5000 INJECTION INTRAVENOUS; SUBCUTANEOUS at 14:21

## 2024-05-07 NOTE — PROGRESS NOTE ADULT - ASSESSMENT
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

## 2024-05-07 NOTE — PROGRESS NOTE ADULT - SUBJECTIVE AND OBJECTIVE BOX
Plastic Surgery Progress Note (pg [facility]: [id number], [facility]: [phone])    SUBJECTIVE  The patient was seen and examined.     OBJECTIVE  ___________________________________________________  VITAL SIGNS / I&O's   Vital Signs Last 24 Hrs  T(C): 37.1 (07 May 2024 09:37), Max: 37.1 (07 May 2024 09:37)  T(F): 98.8 (07 May 2024 09:37), Max: 98.8 (07 May 2024 09:37)  HR: 65 (07 May 2024 09:37) (59 - 76)  BP: 130/61 (07 May 2024 09:37) (115/57 - 166/75)  BP(mean): 84 (06 May 2024 20:30) (84 - 99)  RR: 16 (07 May 2024 09:37) (12 - 20)  SpO2: 100% (07 May 2024 09:37) (94% - 100%)    Parameters below as of 07 May 2024 09:37  Patient On (Oxygen Delivery Method): room air          06 May 2024 07:01  -  07 May 2024 07:00  --------------------------------------------------------  IN:    IV PiggyBack: 200 mL    Lactated Ringers: 1500 mL    Oral Fluid: 680 mL  Total IN: 2380 mL    OUT:    Bulb (mL): 11.5 mL    Bulb (mL): 24 mL    Indwelling Catheter - Urethral (mL): 1775 mL  Total OUT: 1810.5 mL    Total NET: 569.5 mL      07 May 2024 07:01  -  07 May 2024 13:58  --------------------------------------------------------  IN:  Total IN: 0 mL    OUT:    Bulb (mL): 2 mL    Bulb (mL): 10 mL    Indwelling Catheter - Urethral (mL): 400 mL  Total OUT: 412 mL    Total NET: -412 mL        ___________________________________________________  PHYSICAL EXAM    vulvar reconstruction: flaps WWP, incisions intact, gent ointment on incision line. ABDs in place with expected strike through, JPs holding suction - sanguinous output without clot    ___________________________________________________  LABS                        10.2   6.71  )-----------( 155      ( 07 May 2024 04:00 )             30.4     07 May 2024 04:00    141    |  106    |  16     ----------------------------<  104    4.7     |  23     |  0.80     Ca    8.1        07 May 2024 04:00  Phos  3.6       07 May 2024 04:00  Mg     1.70      07 May 2024 04:00        CAPILLARY BLOOD GLUCOSE            Urinalysis Basic - ( 07 May 2024 04:00 )    Color: x / Appearance: x / SG: x / pH: x  Gluc: 104 mg/dL / Ketone: x  / Bili: x / Urobili: x   Blood: x / Protein: x / Nitrite: x   Leuk Esterase: x / RBC: x / WBC x   Sq Epi: x / Non Sq Epi: x / Bacteria: x      ___________________________________________________  MICRO  Recent Cultures:    ___________________________________________________  MEDICATIONS  (STANDING):  acetaminophen     Tablet .. 975 milliGRAM(s) Oral every 6 hours  gentamicin 0.1% Ointment 1 Application(s) Topical every 12 hours  heparin   Injectable 5000 Unit(s) SubCutaneous every 8 hours  ibuprofen  Tablet. 600 milliGRAM(s) Oral every 6 hours  sodium chloride 0.9% lock flush 3 milliLiter(s) IV Push every 8 hours    MEDICATIONS  (PRN):  metoprolol tartrate Injectable 5 milliGRAM(s) IV Push every 6 hours PRN hypertension  ondansetron    Tablet 8 milliGRAM(s) Oral every 8 hours PRN Nausea and/or Vomiting  oxyCODONE    IR 5 milliGRAM(s) Oral every 3 hours PRN Moderate Pain (4 - 6)  senna 1 Tablet(s) Oral at bedtime PRN Constipation  simethicone 80 milliGRAM(s) Chew every 6 hours PRN Gas          Assessment & Plan      Kalia Ashley  Plastic & Reconstructive Surgery, PGY-1  #[id number]

## 2024-05-07 NOTE — PROGRESS NOTE ADULT - SUBJECTIVE AND OBJECTIVE BOX
Gyn ONC Progress Note POD #1, HD#2    Subjective:   Patient seen and examined at bedside. No acute events overnight. No acute complaints. Pain well controlled with current regimen. Patient is tolerating light PO, mostly clears, some solid fruits. Is on bed rest with Hutchins in place. Denies lightheadedness, dizziness, CP, SOB, N/V, fevers, and chills.    Objective:  T(F): 98.2 (05-07-24 @ 05:56), Max: 98.6 (05-06-24 @ 11:10)  HR: 66 (05-07-24 @ 05:56) (59 - 76)  BP: 129/60 (05-07-24 @ 05:56) (115/57 - 166/75)  RR: 16 (05-07-24 @ 05:56) (12 - 20)  SpO2: 99% (05-07-24 @ 05:56) (94% - 100%)  Wt(kg): --  I&O's Summary    06 May 2024 07:01  -  07 May 2024 06:28  --------------------------------------------------------  IN: 1540 mL / OUT: 1252.5 mL / NET: 287.5 mL      CAPILLARY BLOOD GLUCOSE          MEDICATIONS  (STANDING):  acetaminophen   IVPB .. 1000 milliGRAM(s) IV Intermittent once  calcium carbonate   1250 mG (OsCal) 1 Tablet(s) Oral once  gentamicin 0.1% Ointment 1 Application(s) Topical every 12 hours  heparin   Injectable 5000 Unit(s) SubCutaneous every 8 hours  ketorolac   Injectable 30 milliGRAM(s) IV Push every 8 hours  lactated ringers. 1000 milliLiter(s) (125 mL/Hr) IV Continuous <Continuous>    MEDICATIONS  (PRN):  metoprolol tartrate Injectable 5 milliGRAM(s) IV Push every 6 hours PRN hypertension  ondansetron    Tablet 8 milliGRAM(s) Oral every 8 hours PRN Nausea and/or Vomiting  oxyCODONE    IR 5 milliGRAM(s) Oral every 3 hours PRN Moderate Pain (4 - 6)  senna 1 Tablet(s) Oral at bedtime PRN Constipation  simethicone 80 milliGRAM(s) Chew every 6 hours PRN Gas      Physical Exam:  Constitutional: NAD, A+O x3  CV: RRR  Lungs: clear to auscultation bilaterally  Abdomen: soft, nondistended, nontender, no guarding, no rebound, normal bowel sounds  Incision: vertical vulvar incision clean, dry, intact without erythema or drainage.   Drains: Pelvic BHARATI drains with minimal serosanguinous fluid   Extremities: no lower extremity edema or calf tenderness bilaterally; venodynes in place    LABS:    05-07    141    |  106    |  16     ----------------------------<  104<H>  4.7     |  23     |  0.80     Ca    8.1<L>      07 May 2024 04:00  Phos  3.6       05-07  Mg     1.70      05-07            Urinalysis Basic - ( 07 May 2024 04:00 )    Color: x / Appearance: x / SG: x / pH: x  Gluc: 104 mg/dL / Ketone: x  / Bili: x / Urobili: x   Blood: x / Protein: x / Nitrite: x   Leuk Esterase: x / RBC: x / WBC x   Sq Epi: x / Non Sq Epi: x / Bacteria: x                 Gyn ONC Progress Note POD #1, HD#2    Subjective:   Patient seen and examined at bedside. No acute events overnight. No acute complaints. Pain well controlled with current regimen. Patient is tolerating PO, mostly clears, some pieces of fruit. Is on bed rest with Hutchins in place. Denies lightheadedness, dizziness, CP, SOB, N/V, fevers, and chills.    Objective:  T(F): 98.2 (05-07-24 @ 05:56), Max: 98.6 (05-06-24 @ 11:10)  HR: 66 (05-07-24 @ 05:56) (59 - 76)  BP: 129/60 (05-07-24 @ 05:56) (115/57 - 166/75)  RR: 16 (05-07-24 @ 05:56) (12 - 20)  SpO2: 99% (05-07-24 @ 05:56) (94% - 100%)  Wt(kg): --  I&O's Summary    06 May 2024 07:01  -  07 May 2024 06:28  --------------------------------------------------------  IN: 1540 mL / OUT: 1252.5 mL / NET: 287.5 mL    MEDICATIONS  (STANDING):  acetaminophen   IVPB .. 1000 milliGRAM(s) IV Intermittent once  calcium carbonate   1250 mG (OsCal) 1 Tablet(s) Oral once  gentamicin 0.1% Ointment 1 Application(s) Topical every 12 hours  heparin   Injectable 5000 Unit(s) SubCutaneous every 8 hours  ketorolac   Injectable 30 milliGRAM(s) IV Push every 8 hours  lactated ringers. 1000 milliLiter(s) (125 mL/Hr) IV Continuous <Continuous>    MEDICATIONS  (PRN):  metoprolol tartrate Injectable 5 milliGRAM(s) IV Push every 6 hours PRN hypertension  ondansetron    Tablet 8 milliGRAM(s) Oral every 8 hours PRN Nausea and/or Vomiting  oxyCODONE    IR 5 milliGRAM(s) Oral every 3 hours PRN Moderate Pain (4 - 6)  senna 1 Tablet(s) Oral at bedtime PRN Constipation  simethicone 80 milliGRAM(s) Chew every 6 hours PRN Gas      Physical Exam:  Constitutional: NAD, A+O x3  CV: RRR  Lungs: clear to auscultation bilaterally  Abdomen: soft, nondistended, nontender, no guarding, no rebound, normal bowel sounds  Incision: vertical vulvar incision clean, dry, intact without erythema or drainage, ABD pad overlaying.   Drains: Pelvic BHARATI drains with minimal serosanguinous fluid   Extremities: no lower extremity edema or calf tenderness bilaterally; venodynes in place    LABS:    05-07    141    |  106    |  16     ----------------------------<  104<H>  4.7     |  23     |  0.80     Ca    8.1<L>      07 May 2024 04:00  Phos  3.6       05-07  Mg     1.70      05-07    Urinalysis Basic - ( 07 May 2024 04:00 )    Color: x / Appearance: x / SG: x / pH: x  Gluc: 104 mg/dL / Ketone: x  / Bili: x / Urobili: x   Blood: x / Protein: x / Nitrite: x   Leuk Esterase: x / RBC: x / WBC x   Sq Epi: x / Non Sq Epi: x / Bacteria: x                 Gyn ONC Progress Note POD #1, HD#2    Subjective:   Patient seen and examined at bedside. No acute events overnight. No acute complaints. Pain well controlled with current regimen. Patient is tolerating PO, mostly clears, some pieces of fruit. Is on bed rest with Tejada in place. Denies lightheadedness, dizziness, CP, SOB, N/V, fevers, and chills.    Objective:  T(F): 98.2 (05-07-24 @ 05:56), Max: 98.6 (05-06-24 @ 11:10)  HR: 66 (05-07-24 @ 05:56) (59 - 76)  BP: 129/60 (05-07-24 @ 05:56) (115/57 - 166/75)  RR: 16 (05-07-24 @ 05:56) (12 - 20)  SpO2: 99% (05-07-24 @ 05:56) (94% - 100%)  Wt(kg): --  I&O's Summary    06 May 2024 07:01  -  07 May 2024 06:28  --------------------------------------------------------  IN: 1540 mL / OUT: 1252.5 mL / NET: 287.5 mL    MEDICATIONS  (STANDING):  acetaminophen   IVPB .. 1000 milliGRAM(s) IV Intermittent once  calcium carbonate   1250 mG (OsCal) 1 Tablet(s) Oral once  gentamicin 0.1% Ointment 1 Application(s) Topical every 12 hours  heparin   Injectable 5000 Unit(s) SubCutaneous every 8 hours  ketorolac   Injectable 30 milliGRAM(s) IV Push every 8 hours  lactated ringers. 1000 milliLiter(s) (125 mL/Hr) IV Continuous <Continuous>    MEDICATIONS  (PRN):  metoprolol tartrate Injectable 5 milliGRAM(s) IV Push every 6 hours PRN hypertension  ondansetron    Tablet 8 milliGRAM(s) Oral every 8 hours PRN Nausea and/or Vomiting  oxyCODONE    IR 5 milliGRAM(s) Oral every 3 hours PRN Moderate Pain (4 - 6)  senna 1 Tablet(s) Oral at bedtime PRN Constipation  simethicone 80 milliGRAM(s) Chew every 6 hours PRN Gas      Physical Exam:  Constitutional: NAD, A+O x3  CV: RRR  Lungs: clear to auscultation bilaterally  Abdomen: soft, nondistended, nontender, no guarding, no rebound, normal bowel sounds  Incision: vertical vulvar incision clean, dry, intact without erythema or drainage, ABD pad overlaying.   Drains: Pelvic BHARATI drains with minimal serosanguinous fluid, tejada in place draining clear yellow urine.   Extremities: no lower extremity edema or calf tenderness bilaterally; venodynes in place    LABS:    05-07    141    |  106    |  16     ----------------------------<  104<H>  4.7     |  23     |  0.80     Ca    8.1<L>      07 May 2024 04:00  Phos  3.6       05-07  Mg     1.70      05-07    Urinalysis Basic - ( 07 May 2024 04:00 )    Color: x / Appearance: x / SG: x / pH: x  Gluc: 104 mg/dL / Ketone: x  / Bili: x / Urobili: x   Blood: x / Protein: x / Nitrite: x   Leuk Esterase: x / RBC: x / WBC x   Sq Epi: x / Non Sq Epi: x / Bacteria: x

## 2024-05-07 NOTE — PROGRESS NOTE ADULT - ASSESSMENT
A/P: 63y  POD#1 s/p partial radical vulvectomy, vulvar reconstruction of gluteal flap for a preoperative diagnosis of vulvar SCC. Patient doing well and recovering appropriately postop. Currently on bedrest with Hutchins in place.     Neuro: pain well controlled on current regimen: IV Tylenol, Toradol, Oxy PRN  CV: hemodynamically stable, H&H postoperatively 12.2/35.8->10.2/30.4, will continue to monitor.   - Lopressor PRN  Pulm: O2 sat WNL on RA, increase ambulation, encourage incentive spirometry use  GI: tolerating regular diet. Continue Senna, Simethicone, Zofran PRN  : UOP adequate overnight: 1700cc (141cc/h) Continue Hutchins.  - AM BMP wnl. Replete electrolytes as needed, LR@125.   Heme: HSQ and SCDs while in bed for DVT ppx  ID: afebrile, no signs of infection  - Continue gentamicin ointment BID  Endo: No active issues  Dispo: continue routine post-op care.     Jazmyne Nguyễn, PGY1  Seen and discussed with Gynecologic Oncology Team A/P: 63y  POD#1 s/p partial radical vulvectomy, vulvar reconstruction of gluteal flap for vulvar SCC. Patient doing well and recovering appropriately postop. Currently on bedrest with Hutchins in place.     Neuro: pain well controlled on current regimen: IV Tylenol, Toradol, Oxy PRN, consider transition to PO analgesics today.  CV: hemodynamically stable, H&H postoperatively 12.2/35.8->10.2/30.4, will continue to monitor.   - Lopressor PRN  Pulm: O2 sat WNL on RA, encourage incentive spirometry use  GI: continue regular diet as tolerated. Continue Senna, Simethicone, Zofran PRN  : UOP adequate overnight: 1700cc (141cc/h) Continue Hutchins.  - AM BMP wnl. Replete electrolytes as needed, LR@125. Consider SLIV since tolerating PO intake.  Heme: HSQ and SCDs while in bed for DVT ppx  ID: afebrile, no signs of infection  - Continue gentamicin ointment BID  Endo: No active issues  Dispo: continue routine post-op care.     Jazmyne Nguyễn, PGY1  Seen and discussed with Gynecologic Oncology Team

## 2024-05-07 NOTE — CHART NOTE - NSCHARTNOTEFT_GEN_A_CORE
Patient seen and examined at bedside. Feels well, pain well controlled on PO Tylenol and Motrin. Tolerating regular diet. On bedrest with Hutchins in place.    Vital Signs Last 24 Hrs  T(C): 36.7 (07 May 2024 13:50), Max: 37.1 (07 May 2024 09:37)  T(F): 98.1 (07 May 2024 13:50), Max: 98.8 (07 May 2024 09:37)  HR: 68 (07 May 2024 13:50) (59 - 76)  BP: 131/56 (07 May 2024 13:50) (115/57 - 166/75)  BP(mean): 84 (06 May 2024 20:30) (84 - 99)  RR: 17 (07 May 2024 13:50) (12 - 20)  SpO2: 99% (07 May 2024 13:50) (94% - 100%)    Parameters below as of 07 May 2024 13:50  Patient On (Oxygen Delivery Method): room air    Physical Exam:  Constitutional: NAD, A+O x3  CV: RRR  Lungs: clear to auscultation bilaterally  Abdomen: soft, nondistended, nontender, no guarding, no rebound, normal bowel sounds  Incision: vertical vulvar incision clean, dry, intact without erythema or drainage, ABD pad overlaying.   Drains: Pelvic BHARATI drains with minimal serosanguinous fluid. Hutchins in place draining clear yellow urine.   Extremities: no lower extremity edema or calf tenderness bilaterally; venodynes in place      A&P:  A/P: 63y  POD#1 s/p partial radical vulvectomy, vulvar reconstruction of gluteal flap for vulvar SCC. Patient doing well and recovering appropriately postop.     - Continue current pain regimen  - Continue Hutchins and bedrest  - SLIV, UOP adequate   - Appreciate Plastics recs: Continue Gentamicin ointment BID, Change ABDs PRN, Bedrest until 5/8    Jazmyne Nguyễn, PGY1

## 2024-05-08 LAB
ANION GAP SERPL CALC-SCNC: 10 MMOL/L — SIGNIFICANT CHANGE UP (ref 7–14)
BASOPHILS # BLD AUTO: 0.02 K/UL — SIGNIFICANT CHANGE UP (ref 0–0.2)
BASOPHILS NFR BLD AUTO: 0.4 % — SIGNIFICANT CHANGE UP (ref 0–2)
BUN SERPL-MCNC: 13 MG/DL — SIGNIFICANT CHANGE UP (ref 7–23)
CALCIUM SERPL-MCNC: 8.2 MG/DL — LOW (ref 8.4–10.5)
CHLORIDE SERPL-SCNC: 108 MMOL/L — HIGH (ref 98–107)
CO2 SERPL-SCNC: 24 MMOL/L — SIGNIFICANT CHANGE UP (ref 22–31)
CREAT SERPL-MCNC: 0.79 MG/DL — SIGNIFICANT CHANGE UP (ref 0.5–1.3)
EGFR: 85 ML/MIN/1.73M2 — SIGNIFICANT CHANGE UP
EOSINOPHIL # BLD AUTO: 0.13 K/UL — SIGNIFICANT CHANGE UP (ref 0–0.5)
EOSINOPHIL NFR BLD AUTO: 2.3 % — SIGNIFICANT CHANGE UP (ref 0–6)
GLUCOSE SERPL-MCNC: 90 MG/DL — SIGNIFICANT CHANGE UP (ref 70–99)
HCT VFR BLD CALC: 31.4 % — LOW (ref 34.5–45)
HGB BLD-MCNC: 10.6 G/DL — LOW (ref 11.5–15.5)
IANC: 4.18 K/UL — SIGNIFICANT CHANGE UP (ref 1.8–7.4)
IMM GRANULOCYTES NFR BLD AUTO: 0.5 % — SIGNIFICANT CHANGE UP (ref 0–0.9)
LYMPHOCYTES # BLD AUTO: 0.87 K/UL — LOW (ref 1–3.3)
LYMPHOCYTES # BLD AUTO: 15.3 % — SIGNIFICANT CHANGE UP (ref 13–44)
MAGNESIUM SERPL-MCNC: 1.8 MG/DL — SIGNIFICANT CHANGE UP (ref 1.6–2.6)
MCHC RBC-ENTMCNC: 33.2 PG — SIGNIFICANT CHANGE UP (ref 27–34)
MCHC RBC-ENTMCNC: 33.8 GM/DL — SIGNIFICANT CHANGE UP (ref 32–36)
MCV RBC AUTO: 98.4 FL — SIGNIFICANT CHANGE UP (ref 80–100)
MONOCYTES # BLD AUTO: 0.46 K/UL — SIGNIFICANT CHANGE UP (ref 0–0.9)
MONOCYTES NFR BLD AUTO: 8.1 % — SIGNIFICANT CHANGE UP (ref 2–14)
NEUTROPHILS # BLD AUTO: 4.18 K/UL — SIGNIFICANT CHANGE UP (ref 1.8–7.4)
NEUTROPHILS NFR BLD AUTO: 73.4 % — SIGNIFICANT CHANGE UP (ref 43–77)
NRBC # BLD: 0 /100 WBCS — SIGNIFICANT CHANGE UP (ref 0–0)
NRBC # FLD: 0 K/UL — SIGNIFICANT CHANGE UP (ref 0–0)
PHOSPHATE SERPL-MCNC: 2.7 MG/DL — SIGNIFICANT CHANGE UP (ref 2.5–4.5)
PLATELET # BLD AUTO: 169 K/UL — SIGNIFICANT CHANGE UP (ref 150–400)
POTASSIUM SERPL-MCNC: 3.9 MMOL/L — SIGNIFICANT CHANGE UP (ref 3.5–5.3)
POTASSIUM SERPL-SCNC: 3.9 MMOL/L — SIGNIFICANT CHANGE UP (ref 3.5–5.3)
RBC # BLD: 3.19 M/UL — LOW (ref 3.8–5.2)
RBC # FLD: 12.1 % — SIGNIFICANT CHANGE UP (ref 10.3–14.5)
SODIUM SERPL-SCNC: 142 MMOL/L — SIGNIFICANT CHANGE UP (ref 135–145)
WBC # BLD: 5.69 K/UL — SIGNIFICANT CHANGE UP (ref 3.8–10.5)
WBC # FLD AUTO: 5.69 K/UL — SIGNIFICANT CHANGE UP (ref 3.8–10.5)

## 2024-05-08 RX ORDER — CALCIUM CARBONATE 500(1250)
1 TABLET ORAL ONCE
Refills: 0 | Status: COMPLETED | OUTPATIENT
Start: 2024-05-08 | End: 2024-05-08

## 2024-05-08 RX ADMIN — HEPARIN SODIUM 5000 UNIT(S): 5000 INJECTION INTRAVENOUS; SUBCUTANEOUS at 14:32

## 2024-05-08 RX ADMIN — Medication 600 MILLIGRAM(S): at 09:34

## 2024-05-08 RX ADMIN — Medication 600 MILLIGRAM(S): at 22:38

## 2024-05-08 RX ADMIN — SODIUM CHLORIDE 3 MILLILITER(S): 9 INJECTION INTRAMUSCULAR; INTRAVENOUS; SUBCUTANEOUS at 13:07

## 2024-05-08 RX ADMIN — Medication 600 MILLIGRAM(S): at 21:39

## 2024-05-08 RX ADMIN — Medication 600 MILLIGRAM(S): at 03:59

## 2024-05-08 RX ADMIN — Medication 1 APPLICATION(S): at 17:25

## 2024-05-08 RX ADMIN — Medication 975 MILLIGRAM(S): at 00:43

## 2024-05-08 RX ADMIN — Medication 975 MILLIGRAM(S): at 12:35

## 2024-05-08 RX ADMIN — HEPARIN SODIUM 5000 UNIT(S): 5000 INJECTION INTRAVENOUS; SUBCUTANEOUS at 06:30

## 2024-05-08 RX ADMIN — Medication 600 MILLIGRAM(S): at 08:34

## 2024-05-08 RX ADMIN — Medication 600 MILLIGRAM(S): at 15:32

## 2024-05-08 RX ADMIN — Medication 1 TABLET(S): at 11:35

## 2024-05-08 RX ADMIN — Medication 975 MILLIGRAM(S): at 18:25

## 2024-05-08 RX ADMIN — Medication 975 MILLIGRAM(S): at 11:35

## 2024-05-08 RX ADMIN — SODIUM CHLORIDE 3 MILLILITER(S): 9 INJECTION INTRAMUSCULAR; INTRAVENOUS; SUBCUTANEOUS at 21:32

## 2024-05-08 RX ADMIN — Medication 975 MILLIGRAM(S): at 06:30

## 2024-05-08 RX ADMIN — HEPARIN SODIUM 5000 UNIT(S): 5000 INJECTION INTRAVENOUS; SUBCUTANEOUS at 21:40

## 2024-05-08 RX ADMIN — SIMETHICONE 80 MILLIGRAM(S): 80 TABLET, CHEWABLE ORAL at 17:27

## 2024-05-08 RX ADMIN — Medication 975 MILLIGRAM(S): at 17:25

## 2024-05-08 RX ADMIN — SODIUM CHLORIDE 3 MILLILITER(S): 9 INJECTION INTRAMUSCULAR; INTRAVENOUS; SUBCUTANEOUS at 06:26

## 2024-05-08 RX ADMIN — Medication 600 MILLIGRAM(S): at 14:32

## 2024-05-08 RX ADMIN — Medication 1 APPLICATION(S): at 06:30

## 2024-05-08 NOTE — PROGRESS NOTE ADULT - PROBLEM SELECTOR PLAN 1
Gyn Onc Fellow Addendum:    Pt seen and examined at bedside. Agree with above.    VS reviewed  Labs reviewed    Hemodynamically stable   PO pain regimen  Reg diet   Hutchins in situ   Shuffle walking   Appreciate recs per Plastic Surgery team   Encourage IS use  Replete lytes prn  DVT ppx: HSQ, Venodynes   Dispo: cont inpt mgmt     Su Wan MD
Gyn Onc Fellow Addendum:    Pt seen and examined at bedside. Agree with above.    VS reviewed  Labs reviewed    Hemodynamically stable   PO pain regimen  Reg diet   Hutchins in situ   Bedrest   Appreciate recs per Plastic Surgery team   Encourage IS use  Replete lytes prn  DVT ppx: HSQ, Venodynes   Dispo: cont inpt mgmt     Su Wan MD

## 2024-05-08 NOTE — CHART NOTE - NSCHARTNOTEFT_GEN_A_CORE
Patient evaluated while walking in the hallway this afternoon. She reports some back soreness with ambulation but otherwise she denies acute concerns.      Objective  T(C): 36.8 (05-08-24 @ 12:39), Max: 36.8 (05-08-24 @ 12:39)  HR: 66 (05-08-24 @ 09:27) (65 - 72)  BP: 140/64 (05-08-24 @ 09:27) (139/60 - 150/72)  RR: 18 (05-08-24 @ 12:39) (17 - 18)  SpO2: 99% (05-08-24 @ 12:39) (98% - 99%)  Wt(kg): --    05-07 @ 07:01  -  05-08 @ 07:00  --------------------------------------------------------  IN: 0 mL / OUT: 3046.5 mL / NET: -3046.5 mL    05-08 @ 07:01  -  05-08 @ 15:20  --------------------------------------------------------  IN: 480 mL / OUT: 1302.5 mL / NET: -822.5 mL        Gen: NAD  Ext: Moving all ext equally        Interval Updates:  - Continue routine post-op care  - Encourage increased ambulation      Seen by PGY4 Ana Luisa

## 2024-05-08 NOTE — PROGRESS NOTE ADULT - ASSESSMENT
A/P: 63y POD#2 s/p partial radical vulvectomy, vulvar reconstruction of gluteal flap for vulvar SCC. Patient doing well and recovering appropriately postop. Currently on bedrest per PRS with Hutchins to stay in place for 1 week. Patient meeting post op milestones and feeling well.    Neuro: pain well controlled on current regimen: Tylenol, Motrin, Oxy PRN  CV: hemodynamically stable, H&H postoperatively 12.2/35.8->10.2/30.4, will continue to monitor.   - f/u AM CBC  - h/o HTN: Lopressor PRN  Pulm: O2 sat WNL on RA, c/w incentive spirometry use  GI: continue regular diet as tolerated. Continue Senna, Simethicone, Zofran PRN  : UOP adequate overnight: 107cc/hr overnight. Will d/c with Hutchins and provide leg bag teaching  - SLIV  - f/u AM BMP. Replete electrolytes as needed  Heme: HSQ and SCDs while in bed for DVT ppx  ID: afebrile, no signs of infection  - Continue gentamicin ointment BID to vulvar flaps  Endo: No active issues  Activity: likely shuffle walking today per PRS  Drains: management per PRS  Dispo: continue routine post-op care.     seen w/ GYN Onc team  MAEVE Seo, PGY3

## 2024-05-08 NOTE — PROGRESS NOTE ADULT - SUBJECTIVE AND OBJECTIVE BOX
Plastic Surgery Progress Note (pg [facility]: [id number], [facility]: [phone])    SUBJECTIVE  The patient was seen and examined. No acute events overnight.    OBJECTIVE  ___________________________________________________  VITAL SIGNS / I&O's   Vital Signs Last 24 Hrs  T(C): 36.4 (08 May 2024 05:00), Max: 37.1 (07 May 2024 09:37)  T(F): 97.5 (08 May 2024 05:00), Max: 98.8 (07 May 2024 09:37)  HR: 65 (08 May 2024 05:00) (62 - 69)  BP: 139/60 (08 May 2024 05:00) (125/61 - 143/66)  BP(mean): --  RR: 18 (08 May 2024 05:00) (16 - 18)  SpO2: 98% (08 May 2024 05:00) (95% - 100%)    Parameters below as of 08 May 2024 05:00  Patient On (Oxygen Delivery Method): room air          07 May 2024 07:01  -  08 May 2024 07:00  --------------------------------------------------------  IN:  Total IN: 0 mL    OUT:    Bulb (mL): 27.5 mL    Bulb (mL): 19 mL    Indwelling Catheter - Urethral (mL): 3000 mL  Total OUT: 3046.5 mL    Total NET: -3046.5 mL        ___________________________________________________  PHYSICAL EXAM    -- CONSTITUTIONAL: NAD, lying in bed  -- NEURO: Awake, alert  vulvar reconstruction:  WWP, incisions intact, gent ointment on incision line. ABDs in place with expected strike through, JPs holding suction - sanguinous output without clot    ___________________________________________________  LABS                        10.6   5.69  )-----------( 169      ( 08 May 2024 06:03 )             31.4     08 May 2024 06:03    142    |  108    |  13     ----------------------------<  90     3.9     |  24     |  0.79     Ca    8.2        08 May 2024 06:03  Phos  2.7       08 May 2024 06:03  Mg     1.80      08 May 2024 06:03        CAPILLARY BLOOD GLUCOSE            Urinalysis Basic - ( 08 May 2024 06:03 )    Color: x / Appearance: x / SG: x / pH: x  Gluc: 90 mg/dL / Ketone: x  / Bili: x / Urobili: x   Blood: x / Protein: x / Nitrite: x   Leuk Esterase: x / RBC: x / WBC x   Sq Epi: x / Non Sq Epi: x / Bacteria: x      ___________________________________________________  MICRO  Recent Cultures:    ___________________________________________________  MEDICATIONS  (STANDING):  acetaminophen     Tablet .. 975 milliGRAM(s) Oral every 6 hours  gentamicin 0.1% Ointment 1 Application(s) Topical every 12 hours  heparin   Injectable 5000 Unit(s) SubCutaneous every 8 hours  ibuprofen  Tablet. 600 milliGRAM(s) Oral every 6 hours  sodium chloride 0.9% lock flush 3 milliLiter(s) IV Push every 8 hours    MEDICATIONS  (PRN):  metoprolol tartrate Injectable 5 milliGRAM(s) IV Push every 6 hours PRN hypertension  ondansetron    Tablet 8 milliGRAM(s) Oral every 8 hours PRN Nausea and/or Vomiting  oxyCODONE    IR 5 milliGRAM(s) Oral every 3 hours PRN Moderate Pain (4 - 6)  senna 1 Tablet(s) Oral at bedtime PRN Constipation  simethicone 80 milliGRAM(s) Chew every 6 hours PRN Gas   6

## 2024-05-08 NOTE — PROGRESS NOTE ADULT - ASSESSMENT
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

## 2024-05-08 NOTE — PROGRESS NOTE ADULT - SUBJECTIVE AND OBJECTIVE BOX
Gyn ONC Progress Note POD #2    Subjective:   Patient seen and examined at bedside.  No acute events overnight. No acute complaints.  Pain well controlled.  Patient continues to be on bedrest. Patient is passing flatus. Hutchins in place draining yellow urine.  Denies lightheadedness, dizziness, CP, SOB, N/V, fevers, and chills.    Objective:  T(F): 98.4 (05-08-24 @ 01:14), Max: 98.8 (05-07-24 @ 09:37)  HR: 62 (05-08-24 @ 01:14) (62 - 69)  BP: 125/61 (05-08-24 @ 01:14) (125/61 - 143/66)  RR: 17 (05-08-24 @ 01:14) (16 - 17)  SpO2: 99% (05-08-24 @ 01:14) (95% - 100%)  Wt(kg): --  I&O's Summary    06 May 2024 07:01  -  07 May 2024 07:00  --------------------------------------------------------  IN: 2380 mL / OUT: 1810.5 mL / NET: 569.5 mL    07 May 2024 07:01  -  08 May 2024 06:38  --------------------------------------------------------  IN: 0 mL / OUT: 2526.5 mL / NET: -2526.5 mL      CAPILLARY BLOOD GLUCOSE          MEDICATIONS  (STANDING):  acetaminophen     Tablet .. 975 milliGRAM(s) Oral every 6 hours  gentamicin 0.1% Ointment 1 Application(s) Topical every 12 hours  heparin   Injectable 5000 Unit(s) SubCutaneous every 8 hours  ibuprofen  Tablet. 600 milliGRAM(s) Oral every 6 hours  sodium chloride 0.9% lock flush 3 milliLiter(s) IV Push every 8 hours    MEDICATIONS  (PRN):  metoprolol tartrate Injectable 5 milliGRAM(s) IV Push every 6 hours PRN hypertension  ondansetron    Tablet 8 milliGRAM(s) Oral every 8 hours PRN Nausea and/or Vomiting  oxyCODONE    IR 5 milliGRAM(s) Oral every 3 hours PRN Moderate Pain (4 - 6)  senna 1 Tablet(s) Oral at bedtime PRN Constipation  simethicone 80 milliGRAM(s) Chew every 6 hours PRN Gas      Physical Exam:  Constitutional: NAD, A+O x3  CV: RRR  Lungs: clear to auscultation bilaterally  Abdomen: soft, non tender, nondistended, no guarding, no rebound, normal bowel sounds  Incision: vulvar reconstruction using gluteal flaps clean, dry, intact  Drains: minimal serosanguinous fluid from JPx2  Hutchins: yellow urine  Extremities: no lower extremity edema or calf tenderness bilaterally; venodynes in place    LABS:    05-07    141    |  106    |  16     ----------------------------<  104<H>  4.7     |  23     |  0.80     Ca    8.1<L>      07 May 2024 04:00  Phos  3.6       05-07  Mg     1.70      05-07            Urinalysis Basic - ( 07 May 2024 04:00 )    Color: x / Appearance: x / SG: x / pH: x  Gluc: 104 mg/dL / Ketone: x  / Bili: x / Urobili: x   Blood: x / Protein: x / Nitrite: x   Leuk Esterase: x / RBC: x / WBC x   Sq Epi: x / Non Sq Epi: x / Bacteria: x

## 2024-05-08 NOTE — PROGRESS NOTE ADULT - ATTENDING COMMENTS
Patient seen and examined at bedside, agree with above gyn resident / fellow note, including assessment and plan. Incisions c/d/i. Discussed today's plan of care with patient, all questions answered. Continue routine postop care.
I have examined the patient and discussed the treatment plan with the patient as well as fellows and residents involved in the ongoing care.  We have reviewed pertinent clinical findings and I agree with the findings and plan detailed above with the following addendum.     63y  POD#1   Partial radical vulvectomy, vulvar reconstruction by plastics for vulvar SCC.     Labs and VS reviewed.   Patient well appearing    Continue tejada catheter  Continue inpatient management    Kaitlynn Beard MD, FACOG  Gynecologic Oncology

## 2024-05-09 ENCOUNTER — TRANSCRIPTION ENCOUNTER (OUTPATIENT)
Age: 64
End: 2024-05-09

## 2024-05-09 VITALS — TEMPERATURE: 98 F

## 2024-05-09 RX ORDER — GENTAMICIN SULFATE 0.1 %
1 OINTMENT (GRAM) TOPICAL
Qty: 1 | Refills: 0
Start: 2024-05-09

## 2024-05-09 RX ORDER — ACETAMINOPHEN 500 MG
3 TABLET ORAL
Qty: 0 | Refills: 0 | DISCHARGE
Start: 2024-05-09

## 2024-05-09 RX ORDER — OXYCODONE HYDROCHLORIDE 5 MG/1
1 TABLET ORAL
Qty: 6 | Refills: 0
Start: 2024-05-09

## 2024-05-09 RX ORDER — IBUPROFEN 200 MG
1 TABLET ORAL
Qty: 0 | Refills: 0 | DISCHARGE
Start: 2024-05-09

## 2024-05-09 RX ORDER — ONDANSETRON 8 MG/1
4 TABLET, FILM COATED ORAL ONCE
Refills: 0 | Status: COMPLETED | OUTPATIENT
Start: 2024-05-09 | End: 2024-05-09

## 2024-05-09 RX ADMIN — Medication 975 MILLIGRAM(S): at 11:43

## 2024-05-09 RX ADMIN — SODIUM CHLORIDE 3 MILLILITER(S): 9 INJECTION INTRAMUSCULAR; INTRAVENOUS; SUBCUTANEOUS at 06:21

## 2024-05-09 RX ADMIN — Medication 600 MILLIGRAM(S): at 02:59

## 2024-05-09 RX ADMIN — Medication 975 MILLIGRAM(S): at 01:03

## 2024-05-09 RX ADMIN — Medication 975 MILLIGRAM(S): at 00:12

## 2024-05-09 RX ADMIN — Medication 600 MILLIGRAM(S): at 13:58

## 2024-05-09 RX ADMIN — Medication 600 MILLIGRAM(S): at 03:59

## 2024-05-09 RX ADMIN — HEPARIN SODIUM 5000 UNIT(S): 5000 INJECTION INTRAVENOUS; SUBCUTANEOUS at 05:30

## 2024-05-09 RX ADMIN — Medication 975 MILLIGRAM(S): at 05:31

## 2024-05-09 RX ADMIN — Medication 1 APPLICATION(S): at 05:32

## 2024-05-09 RX ADMIN — Medication 975 MILLIGRAM(S): at 12:43

## 2024-05-09 RX ADMIN — ONDANSETRON 4 MILLIGRAM(S): 8 TABLET, FILM COATED ORAL at 09:33

## 2024-05-09 RX ADMIN — Medication 975 MILLIGRAM(S): at 06:21

## 2024-05-09 NOTE — DISCHARGE NOTE NURSING/CASE MANAGEMENT/SOCIAL WORK - PATIENT PORTAL LINK FT
You can access the FollowMyHealth Patient Portal offered by [facility] by registering at the following website: [email or web address]. By joining [employer]’s FollowMyHealth portal, you will also be able to view your health information using other applications (apps) compatible with our system.

## 2024-05-09 NOTE — PROGRESS NOTE ADULT - SUBJECTIVE AND OBJECTIVE BOX
Gyn ONC Progress Note POD #3    Subjective:   Patient seen and examined at bedside.  No acute events overnight. No acute complaints.  Pain well controlled.  Patient is ambulating and tolerating regular diet. Patient is passing flatus. Hutchins is still in place. Right BHARATI bulb unable to maintain suction overnight however minimal output from left bulb.  Denies lightheadedness, dizziness, CP, SOB, N/V, fevers, and chills.    Objective:  T(F): 98.1 (05-09-24 @ 05:28), Max: 98.5 (05-08-24 @ 22:00)  HR: 63 (05-09-24 @ 05:28) (63 - 74)  BP: 132/65 (05-09-24 @ 05:28) (119/59 - 154/74)  RR: 18 (05-09-24 @ 05:28) (17 - 20)  SpO2: 97% (05-09-24 @ 05:28) (97% - 100%)  Wt(kg): --  I&O's Summary    07 May 2024 07:01  -  08 May 2024 07:00  --------------------------------------------------------  IN: 0 mL / OUT: 3046.5 mL / NET: -3046.5 mL    08 May 2024 07:01  -  09 May 2024 06:27  --------------------------------------------------------  IN: 1440 mL / OUT: 3035 mL / NET: -1595 mL      CAPILLARY BLOOD GLUCOSE          MEDICATIONS  (STANDING):  acetaminophen     Tablet .. 975 milliGRAM(s) Oral every 6 hours  gentamicin 0.1% Ointment 1 Application(s) Topical every 12 hours  heparin   Injectable 5000 Unit(s) SubCutaneous every 8 hours  ibuprofen  Tablet. 600 milliGRAM(s) Oral every 6 hours  sodium chloride 0.9% lock flush 3 milliLiter(s) IV Push every 8 hours    MEDICATIONS  (PRN):  metoprolol tartrate Injectable 5 milliGRAM(s) IV Push every 6 hours PRN hypertension  ondansetron    Tablet 8 milliGRAM(s) Oral every 8 hours PRN Nausea and/or Vomiting  oxyCODONE    IR 5 milliGRAM(s) Oral every 3 hours PRN Moderate Pain (4 - 6)  senna 1 Tablet(s) Oral at bedtime PRN Constipation  simethicone 80 milliGRAM(s) Chew every 6 hours PRN Gas      Physical Exam:  Constitutional: NAD, A+O x3  CV: RRR  Lungs: clear to auscultation bilaterally  Abdomen: soft, non- tender, nondistended, no guarding, no rebound, normal bowel sounds  Incision: vulvar reconstruciton via gluteal flaps c/d/i with dermabond prinrafaelo.  Hutchins: yellow urine  Drains: Left drain 10cc serosanguinous output overnight. Right drain 5cc serosanguinous output.  Extremities: no lower extremity edema or calf tenderness bilaterally; venodynes in place    LABS:    05-08    142    |  108<H>  |  13     ----------------------------<  90     3.9     |  24     |  0.79     Ca    8.2<L>      08 May 2024 06:03  Phos  2.7       05-08  Mg     1.80      05-08            Urinalysis Basic - ( 08 May 2024 06:03 )    Color: x / Appearance: x / SG: x / pH: x  Gluc: 90 mg/dL / Ketone: x  / Bili: x / Urobili: x   Blood: x / Protein: x / Nitrite: x   Leuk Esterase: x / RBC: x / WBC x   Sq Epi: x / Non Sq Epi: x / Bacteria: x

## 2024-05-09 NOTE — PROGRESS NOTE ADULT - ASSESSMENT
A/P: 63y POD#3 s/p partial radical vulvectomy, vulvar reconstruction of gluteal flap for vulvar SCC. Patient doing well and recovering appropriately postop. Overnight right BHARATI bulb unable to maintain suction however minimal output for left BHARATI bulb. Patient shuffle walking per PRS recommendations. Patient to be discharged with Hutchins catheter.    Neuro: pain well controlled on current regimen: Tylenol, Motrin, Oxy PRN  CV: hemodynamically stable, H&H postoperatively stable 12.2/35.8->10.2/30.4->10.6/31.4  - h/o HTN: Lopressor PRN  Pulm: O2 sat WNL on RA, c/w incentive spirometry use  GI: continue regular diet. Continue Senna, Simethicone, Zofran PRN  : UOP adequate overnight: 100cc/hr overnight. Will d/c with Hutchins, s/p leg bag teaching  - SLIV  Heme: HSQ and SCDs while in bed for DVT ppx  ID: afebrile, no signs of infection  - Continue gentamicin ointment BID to vulvar flaps  Endo: No active issues  Activity: c/w shuffle walking  Drains: management per PRS, possible removal given decreased output.  Dispo: Discharge planning    seen w/ GYN Onc team  MAEVE Seo, PGY3

## 2024-05-09 NOTE — DISCHARGE NOTE NURSING/CASE MANAGEMENT/SOCIAL WORK - NSDCPEFALRISK_GEN_ALL_CORE
For information on Fall & Injury Prevention, visit: [email or web address] OR  [email or web address] OR  https://www.cdc.gov/steadi/patient.html

## 2024-05-10 ENCOUNTER — NON-APPOINTMENT (OUTPATIENT)
Age: 64
End: 2024-05-10

## 2024-05-17 ENCOUNTER — LABORATORY RESULT (OUTPATIENT)
Age: 64
End: 2024-05-17

## 2024-05-17 ENCOUNTER — APPOINTMENT (OUTPATIENT)
Dept: PLASTIC SURGERY | Facility: CLINIC | Age: 64
End: 2024-05-17
Payer: COMMERCIAL

## 2024-05-17 VITALS
HEART RATE: 86 BPM | TEMPERATURE: 210.74 F | WEIGHT: 132 LBS | SYSTOLIC BLOOD PRESSURE: 153 MMHG | DIASTOLIC BLOOD PRESSURE: 84 MMHG | OXYGEN SATURATION: 98 % | HEIGHT: 60.98 IN | BODY MASS INDEX: 24.92 KG/M2

## 2024-05-17 PROCEDURE — 99024 POSTOP FOLLOW-UP VISIT: CPT

## 2024-05-17 RX ORDER — NYSTATIN 100000 1/G
100000 POWDER TOPICAL
Qty: 1 | Refills: 0 | Status: ACTIVE | COMMUNITY
Start: 2024-05-17 | End: 1900-01-01

## 2024-05-17 RX ORDER — NYSTATIN 100000 [USP'U]/G
100000 CREAM TOPICAL 3 TIMES DAILY
Qty: 1 | Refills: 0 | Status: ACTIVE | COMMUNITY
Start: 2024-05-17 | End: 1900-01-01

## 2024-05-17 NOTE — PHYSICAL EXAM
[de-identified] : vulvar incision healing well, clean, fungal rash noted around vulvar area, no drainage/bleeding noted, tejada in place

## 2024-05-17 NOTE — HISTORY OF PRESENT ILLNESS
[FreeTextEntry1] :  Pt is a 63 year female s/p vulvar reconstruction with gluteal fold v to y advancement flap DOS 5/6/24. Pt reported feeling feverish and burning where her tejada catheter insertion. Patient accompanied by family member. No further complaints at this time. Denies chills, drainage.

## 2024-05-20 LAB — SURGICAL PATHOLOGY STUDY: SIGNIFICANT CHANGE UP

## 2024-05-21 ENCOUNTER — APPOINTMENT (OUTPATIENT)
Dept: GYNECOLOGIC ONCOLOGY | Facility: CLINIC | Age: 64
End: 2024-05-21
Payer: COMMERCIAL

## 2024-05-21 VITALS — SYSTOLIC BLOOD PRESSURE: 158 MMHG | TEMPERATURE: 208.94 F | HEART RATE: 89 BPM | DIASTOLIC BLOOD PRESSURE: 74 MMHG

## 2024-05-21 VITALS — TEMPERATURE: 208.94 F | HEART RATE: 89 BPM | SYSTOLIC BLOOD PRESSURE: 158 MMHG | DIASTOLIC BLOOD PRESSURE: 74 MMHG

## 2024-05-21 DIAGNOSIS — C51.9 MALIGNANT NEOPLASM OF VULVA, UNSPECIFIED: ICD-10-CM

## 2024-05-21 LAB
APPEARANCE: ABNORMAL
BACTERIA UR CULT: NORMAL
BASOPHILS # BLD AUTO: 0.03 K/UL
BASOPHILS NFR BLD AUTO: 0.4 %
BILIRUBIN URINE: NEGATIVE
BLOOD URINE: ABNORMAL
COLOR: YELLOW
EOSINOPHIL # BLD AUTO: 0.1 K/UL
EOSINOPHIL NFR BLD AUTO: 1.2 %
GLUCOSE QUALITATIVE U: NEGATIVE MG/DL
HCT VFR BLD CALC: 32.9 %
HGB BLD-MCNC: 10.6 G/DL
IMM GRANULOCYTES NFR BLD AUTO: 0.2 %
KETONES URINE: NEGATIVE MG/DL
LEUKOCYTE ESTERASE URINE: ABNORMAL
LYMPHOCYTES # BLD AUTO: 1.02 K/UL
LYMPHOCYTES NFR BLD AUTO: 12 %
MAN DIFF?: NORMAL
MCHC RBC-ENTMCNC: 32.2 GM/DL
MCHC RBC-ENTMCNC: 33.7 PG
MCV RBC AUTO: 104.4 FL
MONOCYTES # BLD AUTO: 0.71 K/UL
MONOCYTES NFR BLD AUTO: 8.4 %
NEUTROPHILS # BLD AUTO: 6.61 K/UL
NEUTROPHILS NFR BLD AUTO: 77.8 %
NITRITE URINE: NEGATIVE
PH URINE: 5.5
PLATELET # BLD AUTO: 386 K/UL
PROTEIN URINE: 100 MG/DL
RBC # BLD: 3.15 M/UL
RBC # FLD: 14 %
SPECIFIC GRAVITY URINE: 1.02
UROBILINOGEN URINE: 0.2 MG/DL
WBC # FLD AUTO: 8.49 K/UL

## 2024-05-21 PROCEDURE — 99024 POSTOP FOLLOW-UP VISIT: CPT

## 2024-05-23 ENCOUNTER — APPOINTMENT (OUTPATIENT)
Dept: GYNECOLOGIC ONCOLOGY | Facility: CLINIC | Age: 64
End: 2024-05-23
Payer: COMMERCIAL

## 2024-05-23 VITALS
HEIGHT: 60.98 IN | WEIGHT: 132 LBS | TEMPERATURE: 209.66 F | DIASTOLIC BLOOD PRESSURE: 80 MMHG | BODY MASS INDEX: 24.92 KG/M2 | HEART RATE: 76 BPM | SYSTOLIC BLOOD PRESSURE: 156 MMHG

## 2024-05-23 PROBLEM — C51.9 SQUAMOUS CELL CARCINOMA OF VULVA: Status: ACTIVE | Noted: 2022-07-05

## 2024-05-23 PROCEDURE — 99024 POSTOP FOLLOW-UP VISIT: CPT

## 2024-05-23 NOTE — DISCUSSION/SUMMARY
[Clean] : was clean [Dry] : was dry [Intact] : was intact [Erythema] : was not erythematous [Ecchymosis] : was not ecchymotic [None] : had no drainage [Normal Skin] : normal appearance [Normal Skin Turgor] : normal skin turgor [Doing Well] : is doing well [Excellent Pain Control] : has excellent pain control [No Sign of Infection] : is showing no signs of infection [de-identified] : tejada draining adequately  [de-identified] : postop recu[eration discussed

## 2024-05-23 NOTE — ASSESSMENT
[FreeTextEntry1] : examined with Dr. Mcmillan  Hutchins catheter was reported to be draining appropriately.  The 10 mL balloon, was deflated,  The Hutchins was removed, without any issues.  The patient was advised to record how much urine she voided.  She voided approximately 100 mL, within the hour after removing the Hutchins catheter, before leaving the office.  She was told to call with any pelvic pain, or any fever.  We discussed appropriate hydration.  She is to come in on Thursday foir a re-examination.

## 2024-05-23 NOTE — REASON FOR VISIT
[Post Op] : post op visit [Other ___] : [unfilled] [de-identified] : 5/6/24 [de-identified] : Radical vulvectomy with resection of clitoris with advancement flap reconstruction by Dr. Malone. [de-identified] : Normal bowel function. Normal bladder function. No vaginal bleeding or malodorous discharge. No fevers/chills.  She saw Dr. Malone yesterday.  She has some hematuria, and is requesting the tejada to be removed.

## 2024-05-28 ENCOUNTER — APPOINTMENT (OUTPATIENT)
Dept: PLASTIC SURGERY | Facility: CLINIC | Age: 64
End: 2024-05-28
Payer: COMMERCIAL

## 2024-05-28 VITALS — WEIGHT: 132 LBS | BODY MASS INDEX: 24.92 KG/M2 | HEIGHT: 60.98 IN

## 2024-05-28 PROCEDURE — 99024 POSTOP FOLLOW-UP VISIT: CPT

## 2024-05-28 RX ORDER — GENTAMICIN SULFATE 1 MG/G
0.1 OINTMENT TOPICAL TWICE DAILY
Qty: 1 | Refills: 0 | Status: ACTIVE | COMMUNITY
Start: 2024-05-28 | End: 1900-01-01

## 2024-05-29 ENCOUNTER — APPOINTMENT (OUTPATIENT)
Dept: ULTRASOUND IMAGING | Facility: CLINIC | Age: 64
End: 2024-05-29
Payer: COMMERCIAL

## 2024-05-29 ENCOUNTER — NON-APPOINTMENT (OUTPATIENT)
Age: 64
End: 2024-05-29

## 2024-05-29 ENCOUNTER — OUTPATIENT (OUTPATIENT)
Dept: OUTPATIENT SERVICES | Facility: HOSPITAL | Age: 64
LOS: 1 days | End: 2024-05-29
Payer: COMMERCIAL

## 2024-05-29 DIAGNOSIS — C51.9 MALIGNANT NEOPLASM OF VULVA, UNSPECIFIED: ICD-10-CM

## 2024-05-29 DIAGNOSIS — Z98.890 OTHER SPECIFIED POSTPROCEDURAL STATES: Chronic | ICD-10-CM

## 2024-05-29 PROCEDURE — 93971 EXTREMITY STUDY: CPT

## 2024-05-29 PROCEDURE — 93971 EXTREMITY STUDY: CPT | Mod: 26,RT

## 2024-05-29 NOTE — PHYSICAL EXAM
[de-identified] : vulvar incision healing well, clean, fungal rash resolved around vulvar area, no drainage/bleeding noted, tejada in place mohns swelling likely old hematoma; Right lower extremity swelling no calf pain

## 2024-06-03 NOTE — REASON FOR VISIT
[Post Op] : post op visit [Wound Care] : wound care visit [de-identified] : 5/6/24 [de-identified] : Radical vulvectomy with resection of clitoris with advancement flap reconstruction by Dr. Malone. [de-identified] : Normal bowel function. Normal bladder function. No vaginal bleeding or malodorous discharge. No fevers/chills. Urinating without difficulty.

## 2024-06-03 NOTE — DISCUSSION/SUMMARY
[Clean] : was clean [Dry] : was dry [Intact] : was intact [Erythema] : was not erythematous [Ecchymosis] : was not ecchymotic [None] : had no drainage [Normal Skin] : normal appearance [Normal Skin Turgor] : normal skin turgor [Doing Well] : is doing well [Excellent Pain Control] : has excellent pain control [No Sign of Infection] : is showing no signs of infection [de-identified] : gu/gi function wnl [de-identified] : postop recu[eration discussed [FreeTextEntry1] : 1-Anterior radical vulvectomy with clitoris  Final Diagnosis Anterior vulva with clitoris, radical vulvectomy - Invasive squamous cell carcinoma, moderately differentiated -  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

## 2024-06-04 ENCOUNTER — APPOINTMENT (OUTPATIENT)
Dept: PLASTIC SURGERY | Facility: CLINIC | Age: 64
End: 2024-06-04
Payer: COMMERCIAL

## 2024-06-04 VITALS
OXYGEN SATURATION: 99 % | HEIGHT: 60.98 IN | BODY MASS INDEX: 24.92 KG/M2 | WEIGHT: 132 LBS | SYSTOLIC BLOOD PRESSURE: 164 MMHG | DIASTOLIC BLOOD PRESSURE: 75 MMHG | HEART RATE: 92 BPM | TEMPERATURE: 205.52 F

## 2024-06-04 PROCEDURE — 99024 POSTOP FOLLOW-UP VISIT: CPT

## 2024-06-04 NOTE — PHYSICAL EXAM
[de-identified] : vulvar incision with small dehiscence noted along mons pubis area with drainage. some surrounding erythema, nontender, dressed with bacitracin and gauze

## 2024-06-04 NOTE — HISTORY OF PRESENT ILLNESS
[FreeTextEntry1] :  Pt is a 63 year female s/p vulvar reconstruction with gluteal fold v to y advancement flap DOS 5/6/24. Patient still reports feeling feverish. She had unremarkable lower extremity ultrasound. U/a and urine cx are negative. Hutchins removed. Denies chills.

## 2024-06-09 ENCOUNTER — INPATIENT (INPATIENT)
Facility: HOSPITAL | Age: 64
LOS: 4 days | Discharge: HOME HEALTH | End: 2024-06-14
Attending: PLASTIC SURGERY | Admitting: PLASTIC SURGERY
Payer: COMMERCIAL

## 2024-06-09 VITALS
SYSTOLIC BLOOD PRESSURE: 153 MMHG | HEIGHT: 61 IN | RESPIRATION RATE: 16 BRPM | DIASTOLIC BLOOD PRESSURE: 74 MMHG | WEIGHT: 132.06 LBS | HEART RATE: 82 BPM | OXYGEN SATURATION: 99 % | TEMPERATURE: 98 F

## 2024-06-09 DIAGNOSIS — Z90.79 ACQUIRED ABSENCE OF OTHER GENITAL ORGAN(S): Chronic | ICD-10-CM

## 2024-06-09 DIAGNOSIS — Z98.890 OTHER SPECIFIED POSTPROCEDURAL STATES: Chronic | ICD-10-CM

## 2024-06-09 PROCEDURE — 99285 EMERGENCY DEPT VISIT HI MDM: CPT

## 2024-06-09 RX ORDER — OXYCODONE HYDROCHLORIDE 5 MG/1
10 TABLET ORAL EVERY 6 HOURS
Refills: 0 | Status: DISCONTINUED | OUTPATIENT
Start: 2024-06-09 | End: 2024-06-14

## 2024-06-09 RX ORDER — SODIUM CHLORIDE 9 MG/ML
1000 INJECTION INTRAMUSCULAR; INTRAVENOUS; SUBCUTANEOUS ONCE
Refills: 0 | Status: COMPLETED | OUTPATIENT
Start: 2024-06-09 | End: 2024-06-09

## 2024-06-09 RX ORDER — OXYCODONE HYDROCHLORIDE 5 MG/1
5 TABLET ORAL EVERY 6 HOURS
Refills: 0 | Status: DISCONTINUED | OUTPATIENT
Start: 2024-06-09 | End: 2024-06-14

## 2024-06-09 RX ORDER — ACETAMINOPHEN 500 MG
975 TABLET ORAL EVERY 6 HOURS
Refills: 0 | Status: DISCONTINUED | OUTPATIENT
Start: 2024-06-09 | End: 2024-06-14

## 2024-06-09 RX ORDER — METOPROLOL TARTRATE 50 MG
25 TABLET ORAL DAILY
Refills: 0 | Status: DISCONTINUED | OUTPATIENT
Start: 2024-06-09 | End: 2024-06-10

## 2024-06-09 RX ADMIN — SODIUM CHLORIDE 1000 MILLILITER(S): 9 INJECTION INTRAMUSCULAR; INTRAVENOUS; SUBCUTANEOUS at 23:55

## 2024-06-09 RX ADMIN — Medication 975 MILLIGRAM(S): at 23:59

## 2024-06-09 NOTE — ED ADULT NURSE NOTE - OBJECTIVE STATEMENT
Pt received to room 7. Pt A&Ox4, ambulatory at baseline. Pt s/p vulvectomy with gluteal flap skin graft on 5/6 c/o incision site drainage and bleeding. Endorses fevers at home Tmax 101F. Pt with hx of vulvar cancer, HTN. RR even and unlabored, NAD noted. Denies chills, chest pain, urinary issues, abd pain, SOB. Placed on cardiac monitor, NSR noted. VSS. Plastics at bedside for dressing change/eval. Bleeding controlled at this time. Safety measures in place, pending further orders

## 2024-06-09 NOTE — H&P ADULT - HISTORY OF PRESENT ILLNESS
Plastic Surgery Consult Note  (pg [facility]: [id number], [place]: [phone])    HPI:  64 y/o female with history of carcinoma in situ of vulva sp resection and VY reconstruction by plastic surgery on 5/6, xxxx    PAST MEDICAL & SURGICAL HISTORY:  HTN (hypertension)      Carcinoma in situ of vulva      Fracture of lumbar spine      Celiac disease      Vulvar intraepithelial neoplasia (TATYANA) grade 3      Acid reflux      Hiatal hernia      Vulvar cancer      Osteopenia      Arthritis      History of chemotherapy      S/P radiation therapy      Kidney stone      UTI (urinary tract infection)      S/P biopsy      History of D&C      History of vulvectomy      History of bone marrow biopsy      S/P endometrial ablation      H/O colonoscopy        Allergies    sulfa drugs (Hives)  celiac disease (Stomach Upset)  penicillin (Other)  Keflex (Stomach Upset)  erythromycin (Other)    Intolerances      Home Medications:  acetaminophen 325 mg oral tablet: 3 tab(s) orally every 6 hours (09 May 2024 13:26)  cyclobenzaprine 5 mg oral tablet: 1 tab(s) orally prn as needed for  moderate pain (06 May 2024 11:48)  Folic Acid, 1 mg, PO, Daily, PM:  (06 May 2024 11:48)  ibuprofen 600 mg oral tablet: 1 tab(s) orally every 6 hours (09 May 2024 13:26)  meloxicam 7.5 mg oral tablet: 1 tab(s) orally prn as needed for  moderate pain last dose 3/26/24 (06 May 2024 11:48)  metoprolol succinate 25 mg oral capsule, extended release: 1 cap(s) orally once a day am (06 May 2024 11:48)  Vitamin B 12, 1000Mcg,PO, Daily, PM:  (06 May 2024 11:48)  Vitamin D 3, 50 mcg, PO, Daily:  (06 May 2024 11:48)    MEDICATIONS  (STANDING):      SOCIAL HISTORY:  FAMILY HISTORY:  FH: heart disease (Father)    FH: arrhythmia (Mother)    FH: lupus erythematosus (Father)        ___________________________________________  OBJECTIVE:  Vital Signs Last 24 Hrs  T(C): 36.8 (09 Jun 2024 22:40), Max: 36.8 (09 Jun 2024 22:40)  T(F): 98.2 (09 Jun 2024 22:40), Max: 98.2 (09 Jun 2024 22:40)  HR: 82 (09 Jun 2024 22:40) (82 - 82)  BP: 153/74 (09 Jun 2024 22:40) (153/74 - 153/74)  BP(mean): --  RR: 16 (09 Jun 2024 22:40) (16 - 16)  SpO2: 99% (09 Jun 2024 22:40) (99% - 99%)    Parameters below as of 09 Jun 2024 22:40  Patient On (Oxygen Delivery Method): room air    CAPILLARY BLOOD GLUCOSE        I&O's Detail    General: Well developed, well nourished, NAD  Neuro: Alert and oriented, no focal deficits, moves all extremities spontaneously  HEENT: NCAT, EOMI, anicteric, mucosa moist  Respiratory: Airway patent, respirations unlabored  CVS: Regular rate and rhythm  Abdomen: Soft, nontender, nondistended  Extremities: No edema, sensation and movement grossly intact  Skin: Warm, dry, appropriate color  ____________________________________________  LABS:                      ____________________________________________  MICRO:  RECENT CULTURES:    ____________________________________________  RADIOLOGY:   Plastic Surgery Consult Note  (pg [facility]: [id number], [facility]: [phone])    HPI:  64 y/o female with history of carcinoma in situ of vulva sp resection and VY reconstruction by plastic surgery on 5/6, she most recently had a visit with Dr. Malone last week where small dehiscence was noted and advised to place medihoney. over the past day, noted that there was increasing discharge from wound, and felt fevers, so came to ED. Denies chills, nvd, abd pain, or urinary incontiencence/dysuria. in ED, AVSS. PRS consulted    PAST MEDICAL & SURGICAL HISTORY:  HTN (hypertension)      Carcinoma in situ of vulva      Fracture of lumbar spine      Celiac disease      Vulvar intraepithelial neoplasia (TATYANA) grade 3      Acid reflux      Hiatal hernia      Vulvar cancer      Osteopenia      Arthritis      History of chemotherapy      S/P radiation therapy      Kidney stone      UTI (urinary tract infection)      S/P biopsy      History of D&C      History of vulvectomy      History of bone marrow biopsy      S/P endometrial ablation      H/O colonoscopy        Allergies    sulfa drugs (Hives)  celiac disease (Stomach Upset)  penicillin (Other)  Keflex (Stomach Upset)  erythromycin (Other)    Intolerances      Home Medications:  acetaminophen 325 mg oral tablet: 3 tab(s) orally every 6 hours (09 May 2024 13:26)  cyclobenzaprine 5 mg oral tablet: 1 tab(s) orally prn as needed for  moderate pain (06 May 2024 11:48)  Folic Acid, 1 mg, PO, Daily, PM:  (06 May 2024 11:48)  ibuprofen 600 mg oral tablet: 1 tab(s) orally every 6 hours (09 May 2024 13:26)  meloxicam 7.5 mg oral tablet: 1 tab(s) orally prn as needed for  moderate pain last dose 3/26/24 (06 May 2024 11:48)  metoprolol succinate 25 mg oral capsule, extended release: 1 cap(s) orally once a day am (06 May 2024 11:48)  Vitamin B 12, 1000Mcg,PO, Daily, PM:  (06 May 2024 11:48)  Vitamin D 3, 50 mcg, PO, Daily:  (06 May 2024 11:48)    MEDICATIONS  (STANDING):      SOCIAL HISTORY:  FAMILY HISTORY:  FH: heart disease (Father)    FH: arrhythmia (Mother)    FH: lupus erythematosus (Father)        ___________________________________________  OBJECTIVE:  Vital Signs Last 24 Hrs  T(C): 36.8 (09 Jun 2024 22:40), Max: 36.8 (09 Jun 2024 22:40)  T(F): 98.2 (09 Jun 2024 22:40), Max: 98.2 (09 Jun 2024 22:40)  HR: 82 (09 Jun 2024 22:40) (82 - 82)  BP: 153/74 (09 Jun 2024 22:40) (153/74 - 153/74)  BP(mean): --  RR: 16 (09 Jun 2024 22:40) (16 - 16)  SpO2: 99% (09 Jun 2024 22:40) (99% - 99%)    Parameters below as of 09 Jun 2024 22:40  Patient On (Oxygen Delivery Method): room air    CAPILLARY BLOOD GLUCOSE        I&O's Detail    General: Well developed, well nourished, NAD  Neuro: Alert and oriented, no focal deficits, moves all extremities spontaneously  HEENT: NCAT, EOMI, anicteric, mucosa moist  Respiratory: Airway patent, respirations unlabored  CVS: Regular rate and rhythm  Abdomen: Soft, nontender, nondistended  Extremities: No edema, sensation and movement grossly intact  Skin: Warm, dry, appropriate color  ____________________________________________  LABS:                      ____________________________________________  MICRO:  RECENT CULTURES:    ____________________________________________  RADIOLOGY:

## 2024-06-09 NOTE — ED ADULT NURSE NOTE - NSFALLUNIVINTERV_ED_ALL_ED
Bed/Stretcher in lowest position, wheels locked, appropriate side rails in place/Call bell, personal items and telephone in reach/Instruct patient to call for assistance before getting out of bed/chair/stretcher/Non-slip footwear applied when patient is off stretcher/[place] to call system/Physically safe environment - no spills, clutter or unnecessary equipment/Purposeful proactive rounding/Room/bathroom lighting operational, light cord in reach

## 2024-06-09 NOTE — ED ADULT TRIAGE NOTE - CHIEF COMPLAINT QUOTE
Pt c/o post op complication. Pt had vulvectomy with gluteal flap skin graft on 5/6. States tonight incision site opened, +bleeding and pus-like foul smelling drainage. +low grade fevers at home. Bleeding controlled at this time. PMHx Vulvar CA, HTN

## 2024-06-09 NOTE — ED PROVIDER NOTE - CLINICAL SUMMARY MEDICAL DECISION MAKING FREE TEXT BOX
64 y/o female hx vulvar ca s/p resection + reconstruction one month ago presents with concern for wound dehiscence, fever. She is hemodynamically stable, afebrile, on exam there is a dehisced wound in vaginal area with purulent drainage. Plastic surgery at bedside cleaning/packing wound. Will order labs/imaging to look for systemic infection, admit to plastics. Ragini att: 64 y/o female hx vulvar ca s/p resection + reconstruction one month ago presents with concern for wound dehiscence, fever. She is hemodynamically stable, afebrile, on exam there is a dehisced wound in vaginal area with purulent drainage. Plastic surgery at bedside cleaning/packing wound. Will order labs/imaging to look for systemic infection, admit to plastics.

## 2024-06-09 NOTE — ED PROVIDER NOTE - PHYSICAL EXAMINATION
General: Alert and Orientated x 3. No apparent distress.  Head: Normocephalic and atraumatic.  Eyes: PERRLA with EOMI.   ENT: MMM  Neck: Supple.   Cardiac: Normal S1 and S2 w/ RRR. No murmurs appreciated.   Pulmonary: CTA bilaterally. No increased WOB.   Abdominal: Soft, non-tender, non-distended  : Dehisced wound in vaginal area with purulent drainage   Neurologic: No focal sensory or motor deficits.  Extremities: No lower extremity edema, bruising, soreness   Skin: Color appropriate for race. Intact, warm, and well-perfused.  Psychiatric: Appropriate mood and affect. No apparent risk to self or others.

## 2024-06-09 NOTE — ED ADULT TRIAGE NOTE - PATIENT'S PREFERRED PRONOUN
Hospitalist Medicine DNP    CC: RN called with critical value of H/H 6.5/ 20.8, BP 77/40, TEMP 92.7.  Patient is seen and evaluated.   Vital Signs Last 24 Hrs  T(C): 33.7 (29 Jan 2020 18:09), Max: 36.4 (29 Jan 2020 00:14)  T(F): 92.7 (29 Jan 2020 18:09), Max: 97.5 (29 Jan 2020 00:14)  HR: 76 (29 Jan 2020 18:09) (61 - 88)  BP: 78/46 (29 Jan 2020 18:09) (70/44 - 166/55)  RR: 18 (29 Jan 2020 18:09) (16 - 21)  SpO2: 100% (29 Jan 2020 18:09) (96% - 100%)    REVIEW OF SYSTEMS:  RESPIRATORY: No cough, wheezing, chills or hemoptysis; No shortness of breath  CARDIOVASCULAR: No chest pain, palpitations, dizziness, or leg swelling  GASTROINTESTINAL: No abdominal or epigastric pain. No nausea, vomiting, or hematemesis; No diarrhea or constipation. No melena or hematochezia.  GENITOURINARY: No dysuria, frequency, hematuria, or incontinence  NEUROLOGICAL: No headaches, memory loss, loss of strength, numbness, or tremors      PHYSICAL EXAM:  GENERAL: NAD, well-groomed, well-developed  NERVOUS SYSTEM:  Alert & Oriented X3, Good concentration; Motor Strength 5/5 B/L upper and lower extremities; DTRs 2+ intact and symmetric  CHEST/LUNG: Clear to percussion bilaterally; No rales, rhonchi, wheezing, or rubs  HEART: Regular rate and rhythm; No murmurs, rubs, or gallops  ABDOMEN: Soft, Nontender, Nondistended; Bowel sounds present  EXTREMITIES:  2+ Peripheral Pulses, No clubbing, cyanosis, or edema    Assessment:  HPI:  · HPI Objective Statement: 82 years old female from [facility] rehab c/o coughing sob for three to four days. Pt is sent here to rule out chf vs pneumonia. Pt is alert and oriented to person only unable to give detail hx due to hx of Alzheimer according to the [facility] pt had sat of 91 to 92 %,	    PAST MEDICAL/SURGICAL/FAMILY/SOCIAL HISTORY:    Past Medical History:  Alzheimer's disease    CVA (cerebral vascular accident)    Depression    HTN (hypertension)    MI, old.  recently Dx c hypothyroidism, TSH > 111 started Levothyroxine 50mcg qd as per Dr Vigil (02 Jan 2020 11:41)    Plan:  Stat Normal saline 500ml bolus.  2 unit PRBC.  HYPER THERMIA BLANKET on.  Continue current treatment  follow up labs.  Patient is DNR but for trial of intubation.  - D/w Dr. Stone aware and agree with the plan  - will continue to follow up  Time Spent: 30 Hospitalist Medicine DNP    CC: RN called with critical value of H/H 6.5/ 20.8, BP 77/40, TEMP 92.7.  Patient is seen and evaluated.   Vital Signs Last 24 Hrs  T(C): 33.7 (29 Jan 2020 18:09), Max: 36.4 (29 Jan 2020 00:14)  T(F): 92.7 (29 Jan 2020 18:09), Max: 97.5 (29 Jan 2020 00:14)  HR: 76 (29 Jan 2020 18:09) (61 - 88)  BP: 78/46 (29 Jan 2020 18:09) (70/44 - 166/55)  RR: 18 (29 Jan 2020 18:09) (16 - 21)  SpO2: 100% (29 Jan 2020 18:09) (96% - 100%)    REVIEW OF SYSTEMS:  RESPIRATORY: No cough, wheezing, chills or hemoptysis; No shortness of breath  CARDIOVASCULAR: No chest pain, palpitations, dizziness, or leg swelling  GASTROINTESTINAL: No abdominal or epigastric pain. No nausea, vomiting, or hematemesis; No diarrhea or constipation. No melena or hematochezia.  GENITOURINARY: No dysuria, frequency, hematuria, or incontinence  NEUROLOGICAL: No headaches, memory loss, loss of strength, numbness, or tremors      PHYSICAL EXAM:  GENERAL: NAD, well-groomed, well-developed  NERVOUS SYSTEM:  Alert & Oriented X3, Good concentration; Motor Strength 5/5 B/L upper and lower extremities; DTRs 2+ intact and symmetric  CHEST/LUNG: Clear to percussion bilaterally; No rales, rhonchi, wheezing, or rubs  HEART: Regular rate and rhythm; No murmurs, rubs, or gallops  ABDOMEN: Soft, Nontender, Nondistended; Bowel sounds present  EXTREMITIES:  2+ Peripheral Pulses, No clubbing, cyanosis, or edema    Assessment:  HPI:  · HPI Objective Statement: 82 years old female from [facility] rehab c/o coughing sob for three to four days. Pt is sent here to rule out chf vs pneumonia. Pt is alert and oriented to person only unable to give detail hx due to hx of Alzheimer according to the [facility] pt had sat of 91 to 92 %,	    PAST MEDICAL/SURGICAL/FAMILY/SOCIAL HISTORY:    Past Medical History:  Alzheimer's disease    CVA (cerebral vascular accident)    Depression    HTN (hypertension)    MI, old.  recently Dx c hypothyroidism, TSH > 111 started Levothyroxine 50mcg qd as per Dr Vigil (02 Jan 2020 11:41)    Plan:  Stat Normal saline 500ml bolus.  2 unit PRBC.  HYPER THERMIA BLANKET on.  ICU CONSULT CALLED  Continue current treatment  follow up labs.  Patient is DNR but for trial of intubation.  - D/w Dr. Stone aware and agree with the plan  - will continue to follow up  Time Spent: 40 Hospitalist Medicine DNP    CC: RN called with critical value of H/H 6.5/ 20.8, BP 77/40, TEMP 92.7.  Patient is seen and evaluated.   Vital Signs Last 24 Hrs  T(C): 33.7 (29 Jan 2020 18:09), Max: 36.4 (29 Jan 2020 00:14)  T(F): 92.7 (29 Jan 2020 18:09), Max: 97.5 (29 Jan 2020 00:14)  HR: 76 (29 Jan 2020 18:09) (61 - 88)  BP: 78/46 (29 Jan 2020 18:09) (70/44 - 166/55)  RR: 18 (29 Jan 2020 18:09) (16 - 21)  SpO2: 100% (29 Jan 2020 18:09) (96% - 100%)    REVIEW OF SYSTEMS:  RESPIRATORY: No cough, wheezing, chills or hemoptysis; No shortness of breath  CARDIOVASCULAR: No chest pain, palpitations, dizziness, or leg swelling  GASTROINTESTINAL: No abdominal or epigastric pain. No nausea, vomiting, or hematemesis; No diarrhea or constipation. No melena or hematochezia.  GENITOURINARY: No dysuria, frequency, hematuria, or incontinence  NEUROLOGICAL: No headaches, memory loss, loss of strength, numbness, or tremors      PHYSICAL EXAM:  GENERAL: NAD, well-groomed, well-developed  NERVOUS SYSTEM:  Alert & Oriented X3, Good concentration; Motor Strength 5/5 B/L upper and lower extremities; DTRs 2+ intact and symmetric  CHEST/LUNG: Clear to percussion bilaterally; No rales, rhonchi, wheezing, or rubs  HEART: Regular rate and rhythm; No murmurs, rubs, or gallops  ABDOMEN: Soft, Nontender, Nondistended; Bowel sounds present  EXTREMITIES:  2+ Peripheral Pulses, No clubbing, cyanosis, or edema    Assessment:  HPI:  · HPI Objective Statement: 82 years old female from [facility] rehab c/o coughing sob for three to four days. Pt is sent here to rule out chf vs pneumonia. Pt is alert and oriented to person only unable to give detail hx due to hx of Alzheimer according to the [facility] pt had sat of 91 to 92 %,	    PAST MEDICAL/SURGICAL/FAMILY/SOCIAL HISTORY:    Past Medical History:  Alzheimer's disease    CVA (cerebral vascular accident)    Depression    HTN (hypertension)    MI, old.  recently Dx c hypothyroidism, TSH > 111 started Levothyroxine 50mcg qd as per Dr Vigil (02 Jan 2020 11:41)    Plan:  Stat Normal saline 500ml bolus.  2 unit PRBC.  HYPER THERMIA BLANKET on.  ICU CONSULT CALLED  SEPSIS WORK UP   Continue current treatment  follow up labs.  Patient is DNR but for trial of intubation.  - D/w Dr. Stone aware and agree with the plan  - will continue to follow up  Time Spent: 40 Her/She

## 2024-06-09 NOTE — H&P ADULT - ASSESSMENT
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 Malone last week where small dehiscence was noted and advised to place medihoney. over the past day, noted that there was increasing discharge from wound, and felt fevers, so came to ED. Denies chills, nvd, abd pain, or urinary incontiencence/dysuria. in ED, AVSS. PRS consulted    Plan  - admit to plastic surgery, Dr. Malone  - packing with WTD dakin's  - IV abx  - regular diet  - home meds  - fu ED labs  - discussed course with pt and , they are understanding of this    d/w attending Dr. Kira Jon MD  Plastic and Reconstructive Surgery, PGY3  Available on Microsoft Teams  [facility]: [id number], [facility]: [phone] [facility]: Green Team [id number]

## 2024-06-09 NOTE — ED PROVIDER NOTE - PATIENT'S SEXUAL ORIENTATION
Be mindful of diet  Stay active and stay hydrated  Continue Metformin 500 mg - 2 tablets twice daily (breakfast and dinner)  Continue Mariah  Continue levothyroxine 125 mcg daily Monday through Saturday and no tablet on Sunday  Continue atenolol and simvastatin  Continue with regular supplementation of vitamin D3 daily  Obtain lab work as prescribed  Heterosexual

## 2024-06-09 NOTE — ED PROVIDER NOTE - OBJECTIVE STATEMENT
62 y/o female hx vulvar ca s/p resection + reconstruction one month ago presents with concern for wound dehiscence. There is an opening in the area of her flap with purulent drainage, and she endorses temperature of 101 F this evening. Surgeon Dr. joaquin noticed slight leakage from wound in office last week, but complete dehiscence happened this evening. She denies chest pain, SOB, n/v/d, dysuria/hematuria, lethargy, weakness. She takes metoprolol daily for hypertension, has allergies to penicillin, sulfa, erythromycin, keflex.

## 2024-06-10 DIAGNOSIS — T81.31XA DISRUPTION OF EXTERNAL OPERATION (SURGICAL) WOUND, NOT ELSEWHERE CLASSIFIED, INITIAL ENCOUNTER: ICD-10-CM

## 2024-06-10 LAB
ALBUMIN SERPL ELPH-MCNC: 3.1 G/DL — LOW (ref 3.3–5)
ALP SERPL-CCNC: 107 U/L — SIGNIFICANT CHANGE UP (ref 40–120)
ALT FLD-CCNC: 14 U/L — SIGNIFICANT CHANGE UP (ref 4–33)
ANION GAP SERPL CALC-SCNC: 16 MMOL/L — HIGH (ref 7–14)
APPEARANCE UR: CLEAR — SIGNIFICANT CHANGE UP
APTT BLD: 31 SEC — SIGNIFICANT CHANGE UP (ref 24.5–35.6)
AST SERPL-CCNC: 19 U/L — SIGNIFICANT CHANGE UP (ref 4–32)
BACTERIA # UR AUTO: NEGATIVE /HPF — SIGNIFICANT CHANGE UP
BASE EXCESS BLDV CALC-SCNC: 1.9 MMOL/L — SIGNIFICANT CHANGE UP (ref -2–3)
BASOPHILS # BLD AUTO: 0.03 K/UL — SIGNIFICANT CHANGE UP (ref 0–0.2)
BASOPHILS NFR BLD AUTO: 0.2 % — SIGNIFICANT CHANGE UP (ref 0–2)
BILIRUB SERPL-MCNC: <0.2 MG/DL — SIGNIFICANT CHANGE UP (ref 0.2–1.2)
BILIRUB UR-MCNC: NEGATIVE — SIGNIFICANT CHANGE UP
BLOOD GAS VENOUS COMPREHENSIVE RESULT: SIGNIFICANT CHANGE UP
BUN SERPL-MCNC: 19 MG/DL — SIGNIFICANT CHANGE UP (ref 7–23)
CALCIUM SERPL-MCNC: 9.2 MG/DL — SIGNIFICANT CHANGE UP (ref 8.4–10.5)
CAST: 0 /LPF — SIGNIFICANT CHANGE UP (ref 0–4)
CHLORIDE BLDV-SCNC: 104 MMOL/L — SIGNIFICANT CHANGE UP (ref 96–108)
CHLORIDE SERPL-SCNC: 102 MMOL/L — SIGNIFICANT CHANGE UP (ref 98–107)
CO2 BLDV-SCNC: 28.6 MMOL/L — HIGH (ref 22–26)
CO2 SERPL-SCNC: 23 MMOL/L — SIGNIFICANT CHANGE UP (ref 22–31)
COLOR SPEC: YELLOW — SIGNIFICANT CHANGE UP
CREAT SERPL-MCNC: 1.03 MG/DL — SIGNIFICANT CHANGE UP (ref 0.5–1.3)
DIFF PNL FLD: NEGATIVE — SIGNIFICANT CHANGE UP
EGFR: 61 ML/MIN/1.73M2 — SIGNIFICANT CHANGE UP
EOSINOPHIL # BLD AUTO: 0.04 K/UL — SIGNIFICANT CHANGE UP (ref 0–0.5)
EOSINOPHIL NFR BLD AUTO: 0.3 % — SIGNIFICANT CHANGE UP (ref 0–6)
FLUAV AG NPH QL: SIGNIFICANT CHANGE UP
FLUBV AG NPH QL: SIGNIFICANT CHANGE UP
GAS PNL BLDV: 138 MMOL/L — SIGNIFICANT CHANGE UP (ref 136–145)
GLUCOSE BLDV-MCNC: 130 MG/DL — HIGH (ref 70–99)
GLUCOSE SERPL-MCNC: 127 MG/DL — HIGH (ref 70–99)
GLUCOSE UR QL: NEGATIVE MG/DL — SIGNIFICANT CHANGE UP
HCO3 BLDV-SCNC: 27 MMOL/L — SIGNIFICANT CHANGE UP (ref 22–29)
HCT VFR BLD CALC: 27.2 % — LOW (ref 34.5–45)
HCT VFR BLD CALC: 30.8 % — LOW (ref 34.5–45)
HCT VFR BLDA CALC: 29 % — LOW (ref 34.5–46.5)
HGB BLD CALC-MCNC: 9.8 G/DL — LOW (ref 11.7–16.1)
HGB BLD-MCNC: 10.2 G/DL — LOW (ref 11.5–15.5)
HGB BLD-MCNC: 9 G/DL — LOW (ref 11.5–15.5)
IANC: 11.04 K/UL — HIGH (ref 1.8–7.4)
IMM GRANULOCYTES NFR BLD AUTO: 0.5 % — SIGNIFICANT CHANGE UP (ref 0–0.9)
INR BLD: 1.25 RATIO — HIGH (ref 0.85–1.18)
KETONES UR-MCNC: ABNORMAL MG/DL
LACTATE BLDV-MCNC: 1.2 MMOL/L — SIGNIFICANT CHANGE UP (ref 0.5–2)
LACTATE SERPL-SCNC: 1.2 MMOL/L — SIGNIFICANT CHANGE UP (ref 0.5–2)
LEUKOCYTE ESTERASE UR-ACNC: ABNORMAL
LYMPHOCYTES # BLD AUTO: 0.82 K/UL — LOW (ref 1–3.3)
LYMPHOCYTES # BLD AUTO: 6.2 % — LOW (ref 13–44)
MCHC RBC-ENTMCNC: 33.1 GM/DL — SIGNIFICANT CHANGE UP (ref 32–36)
MCHC RBC-ENTMCNC: 33.1 GM/DL — SIGNIFICANT CHANGE UP (ref 32–36)
MCHC RBC-ENTMCNC: 33.6 PG — SIGNIFICANT CHANGE UP (ref 27–34)
MCHC RBC-ENTMCNC: 33.7 PG — SIGNIFICANT CHANGE UP (ref 27–34)
MCV RBC AUTO: 101.5 FL — HIGH (ref 80–100)
MCV RBC AUTO: 101.7 FL — HIGH (ref 80–100)
MONOCYTES # BLD AUTO: 1.22 K/UL — HIGH (ref 0–0.9)
MONOCYTES NFR BLD AUTO: 9.2 % — SIGNIFICANT CHANGE UP (ref 2–14)
NEUTROPHILS # BLD AUTO: 11.04 K/UL — HIGH (ref 1.8–7.4)
NEUTROPHILS NFR BLD AUTO: 83.6 % — HIGH (ref 43–77)
NITRITE UR-MCNC: NEGATIVE — SIGNIFICANT CHANGE UP
NRBC # BLD: 0 /100 WBCS — SIGNIFICANT CHANGE UP (ref 0–0)
NRBC # BLD: 0 /100 WBCS — SIGNIFICANT CHANGE UP (ref 0–0)
NRBC # FLD: 0 K/UL — SIGNIFICANT CHANGE UP (ref 0–0)
NRBC # FLD: 0 K/UL — SIGNIFICANT CHANGE UP (ref 0–0)
PCO2 BLDV: 45 MMHG — SIGNIFICANT CHANGE UP (ref 39–52)
PH BLDV: 7.39 — SIGNIFICANT CHANGE UP (ref 7.32–7.43)
PH UR: 5.5 — SIGNIFICANT CHANGE UP (ref 5–8)
PLATELET # BLD AUTO: 368 K/UL — SIGNIFICANT CHANGE UP (ref 150–400)
PLATELET # BLD AUTO: 410 K/UL — HIGH (ref 150–400)
PO2 BLDV: 25 MMHG — SIGNIFICANT CHANGE UP (ref 25–45)
POTASSIUM BLDV-SCNC: 3.9 MMOL/L — SIGNIFICANT CHANGE UP (ref 3.5–5.1)
POTASSIUM SERPL-MCNC: 4.6 MMOL/L — SIGNIFICANT CHANGE UP (ref 3.5–5.3)
POTASSIUM SERPL-SCNC: 4.6 MMOL/L — SIGNIFICANT CHANGE UP (ref 3.5–5.3)
PROT SERPL-MCNC: 7 G/DL — SIGNIFICANT CHANGE UP (ref 6–8.3)
PROT UR-MCNC: NEGATIVE MG/DL — SIGNIFICANT CHANGE UP
PROTHROM AB SERPL-ACNC: 13.9 SEC — HIGH (ref 9.5–13)
RBC # BLD: 2.68 M/UL — LOW (ref 3.8–5.2)
RBC # BLD: 3.03 M/UL — LOW (ref 3.8–5.2)
RBC # FLD: 13.1 % — SIGNIFICANT CHANGE UP (ref 10.3–14.5)
RBC # FLD: 13.1 % — SIGNIFICANT CHANGE UP (ref 10.3–14.5)
RBC CASTS # UR COMP ASSIST: 0 /HPF — SIGNIFICANT CHANGE UP (ref 0–4)
RSV RNA NPH QL NAA+NON-PROBE: SIGNIFICANT CHANGE UP
SAO2 % BLDV: 36.6 % — LOW (ref 67–88)
SARS-COV-2 RNA SPEC QL NAA+PROBE: SIGNIFICANT CHANGE UP
SODIUM SERPL-SCNC: 141 MMOL/L — SIGNIFICANT CHANGE UP (ref 135–145)
SP GR SPEC: 1.01 — SIGNIFICANT CHANGE UP (ref 1–1.03)
SQUAMOUS # UR AUTO: 0 /HPF — SIGNIFICANT CHANGE UP (ref 0–5)
UROBILINOGEN FLD QL: 0.2 MG/DL — SIGNIFICANT CHANGE UP (ref 0.2–1)
WBC # BLD: 10 K/UL — SIGNIFICANT CHANGE UP (ref 3.8–10.5)
WBC # BLD: 13.21 K/UL — HIGH (ref 3.8–10.5)
WBC # FLD AUTO: 10 K/UL — SIGNIFICANT CHANGE UP (ref 3.8–10.5)
WBC # FLD AUTO: 13.21 K/UL — HIGH (ref 3.8–10.5)
WBC UR QL: 6 /HPF — HIGH (ref 0–5)

## 2024-06-10 PROCEDURE — 71045 X-RAY EXAM CHEST 1 VIEW: CPT | Mod: 26

## 2024-06-10 PROCEDURE — 93970 EXTREMITY STUDY: CPT | Mod: 26

## 2024-06-10 RX ORDER — CIPROFLOXACIN LACTATE 400MG/40ML
VIAL (ML) INTRAVENOUS
Refills: 0 | Status: DISCONTINUED | OUTPATIENT
Start: 2024-06-10 | End: 2024-06-12

## 2024-06-10 RX ORDER — CIPROFLOXACIN LACTATE 400MG/40ML
400 VIAL (ML) INTRAVENOUS ONCE
Refills: 0 | Status: COMPLETED | OUTPATIENT
Start: 2024-06-10 | End: 2024-06-10

## 2024-06-10 RX ORDER — SODIUM HYPOCHLORITE 0.125 %
1 SOLUTION, NON-ORAL MISCELLANEOUS DAILY
Refills: 0 | Status: DISCONTINUED | OUTPATIENT
Start: 2024-06-10 | End: 2024-06-12

## 2024-06-10 RX ORDER — CIPROFLOXACIN LACTATE 400MG/40ML
VIAL (ML) INTRAVENOUS
Refills: 0 | Status: DISCONTINUED | OUTPATIENT
Start: 2024-06-10 | End: 2024-06-10

## 2024-06-10 RX ORDER — METRONIDAZOLE 500 MG
TABLET ORAL
Refills: 0 | Status: DISCONTINUED | OUTPATIENT
Start: 2024-06-10 | End: 2024-06-12

## 2024-06-10 RX ORDER — METRONIDAZOLE 500 MG
500 TABLET ORAL EVERY 8 HOURS
Refills: 0 | Status: DISCONTINUED | OUTPATIENT
Start: 2024-06-10 | End: 2024-06-12

## 2024-06-10 RX ORDER — METOPROLOL TARTRATE 50 MG
25 TABLET ORAL DAILY
Refills: 0 | Status: DISCONTINUED | OUTPATIENT
Start: 2024-06-10 | End: 2024-06-14

## 2024-06-10 RX ORDER — METRONIDAZOLE 500 MG
500 TABLET ORAL ONCE
Refills: 0 | Status: COMPLETED | OUTPATIENT
Start: 2024-06-10 | End: 2024-06-10

## 2024-06-10 RX ORDER — ENOXAPARIN SODIUM 100 MG/ML
40 INJECTION SUBCUTANEOUS EVERY 24 HOURS
Refills: 0 | Status: DISCONTINUED | OUTPATIENT
Start: 2024-06-10 | End: 2024-06-14

## 2024-06-10 RX ORDER — CIPROFLOXACIN LACTATE 400MG/40ML
400 VIAL (ML) INTRAVENOUS EVERY 12 HOURS
Refills: 0 | Status: DISCONTINUED | OUTPATIENT
Start: 2024-06-10 | End: 2024-06-12

## 2024-06-10 RX ADMIN — OXYCODONE HYDROCHLORIDE 5 MILLIGRAM(S): 5 TABLET ORAL at 21:26

## 2024-06-10 RX ADMIN — Medication 100 MILLIGRAM(S): at 21:26

## 2024-06-10 RX ADMIN — OXYCODONE HYDROCHLORIDE 5 MILLIGRAM(S): 5 TABLET ORAL at 06:15

## 2024-06-10 RX ADMIN — Medication 200 MILLIGRAM(S): at 01:06

## 2024-06-10 RX ADMIN — Medication 1 APPLICATION(S): at 13:57

## 2024-06-10 RX ADMIN — OXYCODONE HYDROCHLORIDE 5 MILLIGRAM(S): 5 TABLET ORAL at 22:12

## 2024-06-10 RX ADMIN — Medication 100 MILLIGRAM(S): at 02:29

## 2024-06-10 RX ADMIN — Medication 25 MILLIGRAM(S): at 06:15

## 2024-06-10 RX ADMIN — OXYCODONE HYDROCHLORIDE 5 MILLIGRAM(S): 5 TABLET ORAL at 06:57

## 2024-06-10 RX ADMIN — OXYCODONE HYDROCHLORIDE 5 MILLIGRAM(S): 5 TABLET ORAL at 15:10

## 2024-06-10 RX ADMIN — Medication 100 MILLIGRAM(S): at 13:56

## 2024-06-10 RX ADMIN — Medication 200 MILLIGRAM(S): at 17:11

## 2024-06-10 RX ADMIN — ENOXAPARIN SODIUM 40 MILLIGRAM(S): 100 INJECTION SUBCUTANEOUS at 17:11

## 2024-06-10 RX ADMIN — Medication 100 MILLIGRAM(S): at 06:15

## 2024-06-10 RX ADMIN — OXYCODONE HYDROCHLORIDE 5 MILLIGRAM(S): 5 TABLET ORAL at 14:10

## 2024-06-10 NOTE — PATIENT PROFILE ADULT - VISION (WITH CORRECTIVE LENSES IF THE PATIENT USUALLY WEARS THEM):
reading glasses  glasses to see far/Partially impaired: cannot see medication labels or newsprint, but can see obstacles in path, and the surrounding layout; can count fingers at arm's length

## 2024-06-10 NOTE — ED ADULT NURSE REASSESSMENT NOTE - NS ED NURSE REASSESS COMMENT FT1
Iv access established with 20g to L AC, labs collected and sent. Safety measures in place, pt stable upon exiting room.

## 2024-06-10 NOTE — ED ADULT NURSE REASSESSMENT NOTE - NS ED NURSE REASSESS COMMENT FT1
Transport at bedside to bring patient to unit. Pt A&OX4, resting comfortably in stretcher, RR even and unlabored. VSS and noted. Abx running as per MD orders. Safety measures in place, pt stable upon exiting room.

## 2024-06-10 NOTE — PATIENT PROFILE ADULT - FALL HARM RISK - HARM RISK INTERVENTIONS

## 2024-06-10 NOTE — CHART NOTE - NSCHARTNOTEFT_GEN_A_CORE
Consult received for "MST Score = />2." Upon chart review, patient with stable weight, does not currently meet criteria for Protein Calorie Malnutrition risk per MST. RD remains available for assessment per protocol or as needed.

## 2024-06-10 NOTE — PROGRESS NOTE ADULT - ASSESSMENT
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 Malone last week where small dehiscence was noted and advised to place medihoney. over the past day, noted that there was increasing discharge from wound, and felt fevers, so came to ED. Denies chills, nvd, abd pain, or urinary incontinence / dysuria.    - Continue wound care with dakins  - Fever work up with UA and CXR  - Regular diet  - Continue abx  - Continue DVT ppx  - Dispo: continue care on floor    Fabiola Araiza MD  Plastic and Reconstructive Surgery, PGY-1  [facility]: [id number]  [facility]: [phone]

## 2024-06-10 NOTE — PROGRESS NOTE ADULT - SUBJECTIVE AND OBJECTIVE BOX
Plastic Surgery Progress Note (pg [facility]: [id number], [facility]: [phone])    SUBJECTIVE  NAEO. AVSS. Pt comfortable in bed. Pain well controlled, no complaints.    OBJECTIVE  ___________________________________________________  VITAL SIGNS / I&O's   Vital Signs Last 24 Hrs  T(C): 37.1 (10 Jensen 2024 06:05), Max: 37.1 (10 Jensen 2024 06:05)  T(F): 98.8 (10 Jensen 2024 06:05), Max: 98.8 (10 Jensen 2024 06:05)  HR: 68 (10 Jensen 2024 06:05) (68 - 82)  BP: 127/59 (10 Jensen 2024 06:05) (125/77 - 153/74)  BP(mean): --  RR: 16 (10 Jensen 2024 06:05) (16 - 18)  SpO2: 100% (10 Jensen 2024 06:05) (99% - 100%)    Parameters below as of 10 Jensen 2024 06:05  Patient On (Oxygen Delivery Method): room air          09 Jun 2024 07:01  -  10 Jensen 2024 07:00  --------------------------------------------------------  IN:  Total IN: 0 mL    OUT:    Voided (mL): 250 mL  Total OUT: 250 mL    Total NET: -250 mL        ___________________________________________________  PHYSICAL EXAM    General: NAD  : small area of dehiscence with erythema. Small amount of drainage and mild purulence. packed with dakins and covered with abd.  ___________________________________________________  LABS                        9.0    10.00 )-----------( 368      ( 10 Jensen 2024 05:28 )             27.2     09 Jun 2024 23:50    141    |  102    |  19     ----------------------------<  127    4.6     |  23     |  1.03     Ca    9.2        09 Jun 2024 23:50    TPro  7.0    /  Alb  3.1    /  TBili  <0.2   /  DBili  x      /  AST  19     /  ALT  14     /  AlkPhos  107    09 Jun 2024 23:50    PT/INR - ( 09 Jun 2024 23:50 )   PT: 13.9 sec;   INR: 1.25 ratio         PTT - ( 09 Jun 2024 23:50 )  PTT:31.0 sec  CAPILLARY BLOOD GLUCOSE            Urinalysis Basic - ( 09 Jun 2024 23:50 )    Color: x / Appearance: x / SG: x / pH: x  Gluc: 127 mg/dL / Ketone: x  / Bili: x / Urobili: x   Blood: x / Protein: x / Nitrite: x   Leuk Esterase: x / RBC: x / WBC x   Sq Epi: x / Non Sq Epi: x / Bacteria: x      ___________________________________________________  MICRO  Recent Cultures:    ___________________________________________________  MEDICATIONS  (STANDING):  ciprofloxacin   IVPB      ciprofloxacin   IVPB 400 milliGRAM(s) IV Intermittent every 12 hours  Dakins Solution - 1/4 Strength 1 Application(s) Topical daily  enoxaparin Injectable 40 milliGRAM(s) SubCutaneous every 24 hours  metoprolol succinate ER 25 milliGRAM(s) Oral daily  metroNIDAZOLE  IVPB      metroNIDAZOLE  IVPB 500 milliGRAM(s) IV Intermittent every 8 hours    MEDICATIONS  (PRN):  acetaminophen     Tablet .. 975 milliGRAM(s) Oral every 6 hours PRN Mild Pain (1 - 3)  oxyCODONE    IR 5 milliGRAM(s) Oral every 6 hours PRN Moderate Pain (4 - 6)  oxyCODONE    IR 10 milliGRAM(s) Oral every 6 hours PRN Severe Pain (7 - 10)

## 2024-06-11 ENCOUNTER — APPOINTMENT (OUTPATIENT)
Dept: GYNECOLOGIC ONCOLOGY | Facility: CLINIC | Age: 64
End: 2024-06-11

## 2024-06-11 LAB
ANION GAP SERPL CALC-SCNC: 11 MMOL/L — SIGNIFICANT CHANGE UP (ref 7–14)
BUN SERPL-MCNC: 11 MG/DL — SIGNIFICANT CHANGE UP (ref 7–23)
CALCIUM SERPL-MCNC: 8.9 MG/DL — SIGNIFICANT CHANGE UP (ref 8.4–10.5)
CHLORIDE SERPL-SCNC: 105 MMOL/L — SIGNIFICANT CHANGE UP (ref 98–107)
CO2 SERPL-SCNC: 25 MMOL/L — SIGNIFICANT CHANGE UP (ref 22–31)
CREAT SERPL-MCNC: 0.78 MG/DL — SIGNIFICANT CHANGE UP (ref 0.5–1.3)
EGFR: 86 ML/MIN/1.73M2 — SIGNIFICANT CHANGE UP
GLUCOSE SERPL-MCNC: 98 MG/DL — SIGNIFICANT CHANGE UP (ref 70–99)
HCT VFR BLD CALC: 27.6 % — LOW (ref 34.5–45)
HGB BLD-MCNC: 9.2 G/DL — LOW (ref 11.5–15.5)
MAGNESIUM SERPL-MCNC: 2 MG/DL — SIGNIFICANT CHANGE UP (ref 1.6–2.6)
MCHC RBC-ENTMCNC: 33.3 GM/DL — SIGNIFICANT CHANGE UP (ref 32–36)
MCHC RBC-ENTMCNC: 33.7 PG — SIGNIFICANT CHANGE UP (ref 27–34)
MCV RBC AUTO: 101.1 FL — HIGH (ref 80–100)
NRBC # BLD: 0 /100 WBCS — SIGNIFICANT CHANGE UP (ref 0–0)
NRBC # FLD: 0 K/UL — SIGNIFICANT CHANGE UP (ref 0–0)
PHOSPHATE SERPL-MCNC: 3 MG/DL — SIGNIFICANT CHANGE UP (ref 2.5–4.5)
PLATELET # BLD AUTO: 385 K/UL — SIGNIFICANT CHANGE UP (ref 150–400)
POTASSIUM SERPL-MCNC: 5 MMOL/L — SIGNIFICANT CHANGE UP (ref 3.5–5.3)
POTASSIUM SERPL-SCNC: 5 MMOL/L — SIGNIFICANT CHANGE UP (ref 3.5–5.3)
RBC # BLD: 2.73 M/UL — LOW (ref 3.8–5.2)
RBC # FLD: 13.1 % — SIGNIFICANT CHANGE UP (ref 10.3–14.5)
SODIUM SERPL-SCNC: 141 MMOL/L — SIGNIFICANT CHANGE UP (ref 135–145)
WBC # BLD: 7.67 K/UL — SIGNIFICANT CHANGE UP (ref 3.8–10.5)
WBC # FLD AUTO: 7.67 K/UL — SIGNIFICANT CHANGE UP (ref 3.8–10.5)

## 2024-06-11 RX ADMIN — ENOXAPARIN SODIUM 40 MILLIGRAM(S): 100 INJECTION SUBCUTANEOUS at 17:23

## 2024-06-11 RX ADMIN — Medication 1 APPLICATION(S): at 12:17

## 2024-06-11 RX ADMIN — Medication 200 MILLIGRAM(S): at 17:23

## 2024-06-11 RX ADMIN — Medication 100 MILLIGRAM(S): at 21:22

## 2024-06-11 RX ADMIN — Medication 25 MILLIGRAM(S): at 05:44

## 2024-06-11 RX ADMIN — Medication 200 MILLIGRAM(S): at 06:54

## 2024-06-11 RX ADMIN — Medication 100 MILLIGRAM(S): at 05:44

## 2024-06-11 RX ADMIN — OXYCODONE HYDROCHLORIDE 5 MILLIGRAM(S): 5 TABLET ORAL at 21:30

## 2024-06-11 RX ADMIN — OXYCODONE HYDROCHLORIDE 5 MILLIGRAM(S): 5 TABLET ORAL at 05:46

## 2024-06-11 RX ADMIN — OXYCODONE HYDROCHLORIDE 5 MILLIGRAM(S): 5 TABLET ORAL at 15:38

## 2024-06-11 RX ADMIN — OXYCODONE HYDROCHLORIDE 5 MILLIGRAM(S): 5 TABLET ORAL at 14:38

## 2024-06-11 RX ADMIN — Medication 100 MILLIGRAM(S): at 14:10

## 2024-06-11 RX ADMIN — OXYCODONE HYDROCHLORIDE 5 MILLIGRAM(S): 5 TABLET ORAL at 06:25

## 2024-06-11 RX ADMIN — OXYCODONE HYDROCHLORIDE 5 MILLIGRAM(S): 5 TABLET ORAL at 22:10

## 2024-06-11 NOTE — PROGRESS NOTE ADULT - ASSESSMENT
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 Malone last week where small dehiscence was noted and advised to place medihoney. over the past day, noted that there was increasing discharge from wound, and felt fevers, so came to ED. Denies chills, nvd, abd pain, or urinary incontinence / dysuria.    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 Malone last week where small dehiscence was noted and advised to place medihoney. over the past day, noted that there was increasing discharge from wound, and felt fevers, so came to ED. Denies chills, nvd, abd pain, or urinary incontinence / dysuria.    - WBC 7.6 from 10  - Continue wound care with dakins  - Fever work up with UA, duplex, and CXR all negative  - Regular diet  - Continue abx  - Continue DVT ppx  - Dispo: continue care on floor    Fabiola Araiza MD  Plastic and Reconstructive Surgery, PGY-1  [facility]: [id number]

## 2024-06-11 NOTE — PROGRESS NOTE ADULT - SUBJECTIVE AND OBJECTIVE BOX
Plastic Surgery Progress Note (pg [facility]: [id number], [facility]: [phone])    SUBJECTIVE  NAEO. AVSS. Pt comfortable in bed. Pain and burning have improved.    OBJECTIVE  ___________________________________________________  VITAL SIGNS / I&O's   Vital Signs Last 24 Hrs  T(C): 37.2 (2024 05:40), Max: 37.3 (10 Jensen 2024 13:39)  T(F): 98.9 (2024 05:40), Max: 99.2 (10 Jensen 2024 22:11)  HR: 66 (2024 05:40) (66 - 80)  BP: 139/62 (2024 05:40) (120/58 - 139/62)  BP(mean): --  RR: 16 (2024 05:40) (16 - 18)  SpO2: 100% (2024 05:40) (97% - 100%)    Parameters below as of 2024 05:40  Patient On (Oxygen Delivery Method): room air          ___________________________________________________  PHYSICAL EXAM    General: NAD  : small area of dehiscence with erythema. Small amount of drainage and mild purulence. packed with dakins and covered with abd.  ___________________________________________________  LABS                        9.2    7.67  )-----------( 385      ( 2024 05:30 )             27.6     2024 23:50    141    |  102    |  19     ----------------------------<  127    4.6     |  23     |  1.03     Ca    9.2        2024 23:50    TPro  7.0    /  Alb  3.1    /  TBili  <0.2   /  DBili  x      /  AST  19     /  ALT  14     /  AlkPhos  107    2024 23:50    PT/INR - ( 2024 23:50 )   PT: 13.9 sec;   INR: 1.25 ratio         PTT - ( 2024 23:50 )  PTT:31.0 sec  CAPILLARY BLOOD GLUCOSE            Urinalysis Basic - ( 10 Jensen 2024 17:36 )    Color: Yellow / Appearance: Clear / S.014 / pH: x  Gluc: x / Ketone: Trace mg/dL  / Bili: Negative / Urobili: 0.2 mg/dL   Blood: x / Protein: Negative mg/dL / Nitrite: Negative   Leuk Esterase: Trace / RBC: 0 /HPF / WBC 6 /HPF   Sq Epi: x / Non Sq Epi: 0 /HPF / Bacteria: Negative /HPF      ___________________________________________________  MICRO  Recent Cultures:    ___________________________________________________  MEDICATIONS  (STANDING):  ciprofloxacin   IVPB      ciprofloxacin   IVPB 400 milliGRAM(s) IV Intermittent every 12 hours  Dakins Solution - 1/4 Strength 1 Application(s) Topical daily  enoxaparin Injectable 40 milliGRAM(s) SubCutaneous every 24 hours  metoprolol succinate ER 25 milliGRAM(s) Oral daily  metroNIDAZOLE  IVPB      metroNIDAZOLE  IVPB 500 milliGRAM(s) IV Intermittent every 8 hours    MEDICATIONS  (PRN):  acetaminophen     Tablet .. 975 milliGRAM(s) Oral every 6 hours PRN Mild Pain (1 - 3)  oxyCODONE    IR 5 milliGRAM(s) Oral every 6 hours PRN Moderate Pain (4 - 6)  oxyCODONE    IR 10 milliGRAM(s) Oral every 6 hours PRN Severe Pain (7 - 10)

## 2024-06-12 LAB
ANION GAP SERPL CALC-SCNC: 11 MMOL/L — SIGNIFICANT CHANGE UP (ref 7–14)
BUN SERPL-MCNC: 9 MG/DL — SIGNIFICANT CHANGE UP (ref 7–23)
CALCIUM SERPL-MCNC: 8.7 MG/DL — SIGNIFICANT CHANGE UP (ref 8.4–10.5)
CHLORIDE SERPL-SCNC: 106 MMOL/L — SIGNIFICANT CHANGE UP (ref 98–107)
CO2 SERPL-SCNC: 25 MMOL/L — SIGNIFICANT CHANGE UP (ref 22–31)
CREAT SERPL-MCNC: 0.74 MG/DL — SIGNIFICANT CHANGE UP (ref 0.5–1.3)
EGFR: 91 ML/MIN/1.73M2 — SIGNIFICANT CHANGE UP
GLUCOSE SERPL-MCNC: 94 MG/DL — SIGNIFICANT CHANGE UP (ref 70–99)
HCT VFR BLD CALC: 28.2 % — LOW (ref 34.5–45)
HGB BLD-MCNC: 9 G/DL — LOW (ref 11.5–15.5)
MAGNESIUM SERPL-MCNC: 2.1 MG/DL — SIGNIFICANT CHANGE UP (ref 1.6–2.6)
MCHC RBC-ENTMCNC: 31.9 GM/DL — LOW (ref 32–36)
MCHC RBC-ENTMCNC: 32.6 PG — SIGNIFICANT CHANGE UP (ref 27–34)
MCV RBC AUTO: 102.2 FL — HIGH (ref 80–100)
NRBC # BLD: 0 /100 WBCS — SIGNIFICANT CHANGE UP (ref 0–0)
NRBC # FLD: 0 K/UL — SIGNIFICANT CHANGE UP (ref 0–0)
PHOSPHATE SERPL-MCNC: 3.2 MG/DL — SIGNIFICANT CHANGE UP (ref 2.5–4.5)
PLATELET # BLD AUTO: 387 K/UL — SIGNIFICANT CHANGE UP (ref 150–400)
POTASSIUM SERPL-MCNC: 4 MMOL/L — SIGNIFICANT CHANGE UP (ref 3.5–5.3)
POTASSIUM SERPL-SCNC: 4 MMOL/L — SIGNIFICANT CHANGE UP (ref 3.5–5.3)
RBC # BLD: 2.76 M/UL — LOW (ref 3.8–5.2)
RBC # FLD: 13 % — SIGNIFICANT CHANGE UP (ref 10.3–14.5)
SODIUM SERPL-SCNC: 142 MMOL/L — SIGNIFICANT CHANGE UP (ref 135–145)
WBC # BLD: 5.74 K/UL — SIGNIFICANT CHANGE UP (ref 3.8–10.5)
WBC # FLD AUTO: 5.74 K/UL — SIGNIFICANT CHANGE UP (ref 3.8–10.5)

## 2024-06-12 PROCEDURE — 99222 1ST HOSP IP/OBS MODERATE 55: CPT

## 2024-06-12 RX ORDER — CIPROFLOXACIN LACTATE 400MG/40ML
500 VIAL (ML) INTRAVENOUS EVERY 12 HOURS
Refills: 0 | Status: DISCONTINUED | OUTPATIENT
Start: 2024-06-12 | End: 2024-06-13

## 2024-06-12 RX ORDER — METRONIDAZOLE 500 MG
500 TABLET ORAL EVERY 8 HOURS
Refills: 0 | Status: DISCONTINUED | OUTPATIENT
Start: 2024-06-12 | End: 2024-06-13

## 2024-06-12 RX ADMIN — Medication 500 MILLIGRAM(S): at 13:25

## 2024-06-12 RX ADMIN — OXYCODONE HYDROCHLORIDE 5 MILLIGRAM(S): 5 TABLET ORAL at 22:22

## 2024-06-12 RX ADMIN — Medication 25 MILLIGRAM(S): at 05:49

## 2024-06-12 RX ADMIN — Medication 100 MILLIGRAM(S): at 05:49

## 2024-06-12 RX ADMIN — OXYCODONE HYDROCHLORIDE 5 MILLIGRAM(S): 5 TABLET ORAL at 07:36

## 2024-06-12 RX ADMIN — OXYCODONE HYDROCHLORIDE 5 MILLIGRAM(S): 5 TABLET ORAL at 21:42

## 2024-06-12 RX ADMIN — Medication 975 MILLIGRAM(S): at 17:21

## 2024-06-12 RX ADMIN — Medication 500 MILLIGRAM(S): at 17:20

## 2024-06-12 RX ADMIN — Medication 975 MILLIGRAM(S): at 18:21

## 2024-06-12 RX ADMIN — OXYCODONE HYDROCHLORIDE 5 MILLIGRAM(S): 5 TABLET ORAL at 06:58

## 2024-06-12 RX ADMIN — Medication 500 MILLIGRAM(S): at 21:42

## 2024-06-12 RX ADMIN — Medication 200 MILLIGRAM(S): at 06:58

## 2024-06-12 RX ADMIN — ENOXAPARIN SODIUM 40 MILLIGRAM(S): 100 INJECTION SUBCUTANEOUS at 17:20

## 2024-06-12 NOTE — CONSULT NOTE ADULT - ASSESSMENT
This is a 62 y/o F w/ PMHx of carcinoma in situ of vulva s/p resection, VY reconstruction by plastic surgery on 5/6 now presenting to [facility] on 6/10/24 for dehisence of surgical site, discharge, subjective fevers.   In the ER, pt was afebrile, all VSS stable.   Labs w/ mild leukocytosis to 13, mild RODNEY to 1.03 from 0.79.   BCx x 2 NGTD, U/A only 6 WBCs.  Pt was started on Cipro/Flagy, local wound care. Improved leukocytosis.    #Wound dehisence   #Purulent drainage   #Leukocytosis, improved  #RODNEY, improved     Overall, 62 y/o F w/ PMHx of carcinoma in situ of vulva s/p resection, VY reconstruction by plastic surgery on 5/6, p/w wound dehisence and drainage, mild leukocytosis now resolved, given multiple allergies, pt was started on Ciprofloxacin/Metronidazole.  No fever, no leukocytosis, both wounds packed, no drainage noted. Unfortunately, no wound Cx taken, BCx are negative.   Pt w/ extensive allergy hx, PCN-rash as a child, erythromycin-fever, sulfa drugs, keflex severe abdominal pain. Has tolerated doxycyline and levofloxacin before.   Would change antibiotics to cover skin organism, such as GPCs, staph/strep. GNR coverage given vulvar wound.     Plan:   1. Doxycyline 100 mg BID, Levofloxacin 500 mg q24 for 7 day course ending 6/18/24   2. C/w wound care, outpatient plastic surgery f/u   3. If further purulent drainage would send Cx to guide antimicrobial therapy     D/w primary team     Thank you for this consult. Inpatient ID consult team will sign off.    Further changes in lab values, imaging studies, or clinical status will not be known to ID inpatient consultants unless specifically communicated by primary team.    Joe Lilly MD  Attending Physician  Division of Infectious Diseases  Department of Medicine    Please contact through MS Teams message.  Office: [phone] (after 5 PM or weekend)

## 2024-06-12 NOTE — CONSULT NOTE ADULT - SUBJECTIVE AND OBJECTIVE BOX
ID INITIAL CONSULT NOTE     Patient is a 63y old  Female who presents with a chief complaint of wound dehiscence (12 Jun 2024 08:26)    HPI:  64 y/o female with history of carcinoma in situ of vulva sp resection and VY reconstruction by plastic surgery on 5/6, she most recently had a visit with Dr. Malone last week where small dehiscence was noted and advised to place medihoney. over the past day, noted that there was increasing discharge from wound, and felt fevers, so came to ED. Denies chills, nvd, abd pain, or urinary incontiencence/dysuria. in ED, AVSS. PRS consulted    prior hospital charts reviewed [ ]  primary team notes reviewed [ X ]  other consultant notes reviewed [X  ]    PAST MEDICAL & SURGICAL HISTORY:  HTN (hypertension)      Carcinoma in situ of vulva      Fracture of lumbar spine      Celiac disease      Vulvar intraepithelial neoplasia (TATYANA) grade 3      Acid reflux      Hiatal hernia      Vulvar cancer      Osteopenia      Arthritis      History of chemotherapy      S/P radiation therapy      Kidney stone      UTI (urinary tract infection)      S/P biopsy      History of D&C      History of vulvectomy      History of bone marrow biopsy      S/P endometrial ablation      H/O colonoscopy          Allergies  sulfa drugs (Hives)  celiac disease (Stomach Upset)  penicillin (Other)  Keflex (Stomach Upset)  erythromycin (Other)        ANTIMICROBIALS:  ciprofloxacin     Tablet 500 every 12 hours  metroNIDAZOLE    Tablet 500 every 8 hours      Current Abx:     Past Abx:       OTHER MEDS: MEDICATIONS  (STANDING):  acetaminophen     Tablet .. 975 every 6 hours PRN  enoxaparin Injectable 40 every 24 hours  metoprolol succinate ER 25 daily  oxyCODONE    IR 5 every 6 hours PRN  oxyCODONE    IR 10 every 6 hours PRN      SOCIAL HISTORY: [ ] etoh [ ] tobacco [ ] former smoker [ ] IVDU  Occasional alcohol     FAMILY HISTORY:  FH: heart disease (Father)    FH: arrhythmia (Mother)    FH: lupus erythematosus (Father)        REVIEW OF SYSTEMS:    CONSTITUTIONAL: No weakness, fevers or chills  EYES/ENT: No visual changes;  No vertigo or throat pain   NECK: No pain or stiffness  RESPIRATORY: No cough, wheezing, hemoptysis; No shortness of breath  CARDIOVASCULAR: No chest pain or palpitations  GASTROINTESTINAL: No abdominal or epigastric pain. No nausea, vomiting, or hematemesis; No diarrhea or constipation. No melena or hematochezia.  GENITOURINARY: No dysuria, frequency or hematuria  NEUROLOGICAL: No numbness or weakness  SKIN: erythema around vulva, some burning   All other review of systems is negative unless indicated above.    Vital Signs Last 24 Hrs  T(F): 98.4 (06-12-24 @ 13:47), Max: 99.2 (06-10-24 @ 22:11)    Vital Signs Last 24 Hrs  HR: 71 (06-12-24 @ 13:47) (71 - 80)  BP: 118/59 (06-12-24 @ 13:47) (118/59 - 148/67)  RR: 18 (06-12-24 @ 13:47)  SpO2: 99% (06-12-24 @ 13:47) (96% - 100%)  Wt(kg): --    PHYSICAL EXAM:  GENERAL: NAD, well-developed  HEAD:  Atraumatic, Normocephalic  EYES: EOMI, conjunctiva and sclera clear  CHEST/LUNG: On RA, not in respiratory distress   EXTREMITIES:  2+ Peripheral Pulses, No clubbing, cyanosis, or edema  PSYCH: AAOx3  NEUROLOGY: non-focal  SKIN: R thigh surgical site with packing, some yellowish substance on dressing, medihoney? Vulva w/ erythema, wound packed, no clear drainage.       Labs:                          9.0    5.74  )-----------( 387      ( 12 Jun 2024 05:26 )             28.2       06-12    142  |  106  |  9   ----------------------------<  94  4.0   |  25  |  0.74    Ca    8.7      12 Jun 2024 05:26  Phos  3.2     06-12  Mg     2.10     06-12        Urinalysis Basic - ( 12 Jun 2024 05:26 )    Color: x / Appearance: x / SG: x / pH: x  Gluc: 94 mg/dL / Ketone: x  / Bili: x / Urobili: x   Blood: x / Protein: x / Nitrite: x   Leuk Esterase: x / RBC: x / WBC x   Sq Epi: x / Non Sq Epi: x / Bacteria: x          MICROBIOLOGY:  .Blood Blood-Peripheral  06-09-24   No growth at 48 Hours  --  --      .Blood Blood-Peripheral  06-09-24   No growth at 48 Hours  --  --          RADIOLOGY:

## 2024-06-12 NOTE — PROGRESS NOTE ADULT - SUBJECTIVE AND OBJECTIVE BOX
Plastic Surgery Progress Note (pg [facility]: [id number], NS: [phone])    SUBJECTIVE  NAEO. AVSS. Pt comfortable in bed. Pain well controlled, no complaints.    OBJECTIVE  ___________________________________________________  VITAL SIGNS / I&O's   Vital Signs Last 24 Hrs  T(C): 37.2 (12 Jun 2024 05:45), Max: 37.2 (12 Jun 2024 05:45)  T(F): 99 (12 Jun 2024 05:45), Max: 99 (12 Jun 2024 05:45)  HR: 75 (12 Jun 2024 05:45) (70 - 77)  BP: 121/62 (12 Jun 2024 05:45) (114/60 - 148/67)  BP(mean): --  RR: 16 (12 Jun 2024 05:45) (16 - 18)  SpO2: 96% (12 Jun 2024 05:45) (95% - 100%)    Parameters below as of 12 Jun 2024 05:45  Patient On (Oxygen Delivery Method): room air          ___________________________________________________  PHYSICAL EXAM    : small area of dehiscence with erythema. Small amount of drainage and mild fibrinous exudate. packed with dakins and covered with abd.  posterior thigh with 1cm area of dehiscence medially and laterally. packed with dry guaze  ___________________________________________________  LABS                        9.0    5.74  )-----------( 387      ( 12 Jun 2024 05:26 )             28.2     12 Jun 2024 05:26    142    |  106    |  9      ----------------------------<  94     4.0     |  25     |  0.74     Ca    8.7        12 Jun 2024 05:26  Phos  3.2       12 Jun 2024 05:26  Mg     2.10      12 Jun 2024 05:26        CAPILLARY BLOOD GLUCOSE            Urinalysis Basic - ( 12 Jun 2024 05:26 )    Color: x / Appearance: x / SG: x / pH: x  Gluc: 94 mg/dL / Ketone: x  / Bili: x / Urobili: x   Blood: x / Protein: x / Nitrite: x   Leuk Esterase: x / RBC: x / WBC x   Sq Epi: x / Non Sq Epi: x / Bacteria: x      ___________________________________________________  MICRO  Recent Cultures:  Specimen Source: .Blood Blood-Peripheral, 06-09 @ 23:55; Results   No growth at 24 hours; Gram Stain: --; Organism: --  Specimen Source: .Blood Blood-Peripheral, 06-09 @ 23:40; Results   No growth at 24 hours; Gram Stain: --; Organism: --    ___________________________________________________  MEDICATIONS  (STANDING):  ciprofloxacin   IVPB      ciprofloxacin   IVPB 400 milliGRAM(s) IV Intermittent every 12 hours  enoxaparin Injectable 40 milliGRAM(s) SubCutaneous every 24 hours  metoprolol succinate ER 25 milliGRAM(s) Oral daily  metroNIDAZOLE  IVPB      metroNIDAZOLE  IVPB 500 milliGRAM(s) IV Intermittent every 8 hours    MEDICATIONS  (PRN):  acetaminophen     Tablet .. 975 milliGRAM(s) Oral every 6 hours PRN Mild Pain (1 - 3)  oxyCODONE    IR 10 milliGRAM(s) Oral every 6 hours PRN Severe Pain (7 - 10)  oxyCODONE    IR 5 milliGRAM(s) Oral every 6 hours PRN Moderate Pain (4 - 6)

## 2024-06-12 NOTE — PROGRESS NOTE ADULT - ASSESSMENT
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 Malone last week where small dehiscence was noted and advised to place medihoney. over the past day, noted that there was increasing discharge from wound, and felt fevers, so came to ED. Denies chills, nvd, abd pain, or urinary incontinence / dysuria. Afebrile all stay. Negative fever workup    - WBC down to 5  - Continue wound care with saline wet to dry, stop dakins 6/12  - Regular diet  - Consult ID for oral regimen (currently on IV cipro/flagyl)  - Continue DVT ppx  - Dispo: continue care on floor    Fabiola Araiza MD  Plastic and Reconstructive Surgery, PGY-1  LIMARY: [phone]

## 2024-06-12 NOTE — CHART NOTE - NSCHARTNOTEFT_GEN_A_CORE
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 Patient reports that for the last week at home she was having low grade temperatures and was feeling subjectively febrile. She had an appointment with plastics last week and mentioned concern for possible wound infection/separation. . Kira last week noticed a small dehiscence and advised to place medihoney. On Sunday, she noted that there was increasing discharge from wound, and felt fevers, so came to ED. Today patient states that she is feeling better and that the dressing and packing make surrounding  are more comfortable to sit and walk. Denies fevers , abdominal pain and endorses no  symptoms.      Vital Signs Last 24 Hours  T(C): 36.9 (06-12-24 @ 13:47), Max: 37.2 (06-12-24 @ 05:45)  HR: 71 (06-12-24 @ 13:47) (71 - 80)  BP: 118/59 (06-12-24 @ 13:47) (118/59 - 148/67)  RR: 18 (06-12-24 @ 13:47) (16 - 18)  SpO2: 99% (06-12-24 @ 13:47) (96% - 100%)    I&O's Summary      Physical Exam:  General: NAD  Lungs: breathing comfortably on RA  Abdomen: Soft, appropriately tender, non-distended  : small area of dehiscence with erythema. Small amount of drainage and mild fibrinous exudate. packed with dakins and covered with abd.  posterior thigh with 1cm area of dehiscence medially and laterally. packed with dry guaze  Ext: No pain or swelling    Labs:             9.0<L>  5.74  )-----------( 387      ( 06-12 @ 05:26 )             28.2<L>               9.2<L>  7.67  )-----------( 385      ( 06-11 @ 05:30 )             27.6<L>               9.0<L>  10.00 )-----------( 368      ( 06-10 @ 05:28 )             27.2<L>               10.2<L>  13.21<H> )-----------( 410<H>    ( 06-09 @ 23:50 )             30.8<L>        MEDICATIONS  (STANDING):  ciprofloxacin     Tablet 500 milliGRAM(s) Oral every 12 hours  enoxaparin Injectable 40 milliGRAM(s) SubCutaneous every 24 hours  metoprolol succinate ER 25 milliGRAM(s) Oral daily  metroNIDAZOLE    Tablet 500 milliGRAM(s) Oral every 8 hours    MEDICATIONS  (PRN):  acetaminophen     Tablet .. 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 Patient admitted with wound infection and dehiscence, currently on Cipro/Flagyl(6/10-) and states less wound pain and no subjective fevers since admission and current treatment.     -VSS and reviewed.  -UA(6/10) with pos Leuks, Urine Cx pending would collect if team has not.  -Bcx(6/9): NGTD  -LE doppler and CXR wnl (6/10)  -GYN will continue to follow, but no acute GYN interventions at this time.    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 Patient reports that for the last week at home she was having low grade temperatures and was feeling subjectively febrile. She had an appointment with plastics last week and mentioned concern for possible wound infection/separation. . Kira last week noticed a small dehiscence and advised to place medihoney. On Sunday, she noted that there was increasing discharge from wound, and felt fevers, so came to ED. Today patient states that she is feeling better and that the dressing and packing make surrounding  are more comfortable to sit and walk. Denies fevers , abdominal pain and endorses no  symptoms.      Vital Signs Last 24 Hours  T(C): 36.9 (06-12-24 @ 13:47), Max: 37.2 (06-12-24 @ 05:45)  HR: 71 (06-12-24 @ 13:47) (71 - 80)  BP: 118/59 (06-12-24 @ 13:47) (118/59 - 148/67)  RR: 18 (06-12-24 @ 13:47) (16 - 18)  SpO2: 99% (06-12-24 @ 13:47) (96% - 100%)    I&O's Summary      Physical Exam:  General: NAD  Lungs: breathing comfortably on RA  Abdomen: Soft, appropriately tender, non-distended  : small area of dehiscence with erythema. Small amount of drainage and mild fibrinous exudate. packed with dakins and covered with abd.  posterior thigh with 1cm area of dehiscence medially and laterally. packed with dry guaze  Ext: No pain or swelling    Labs:             9.0<L>  5.74  )-----------( 387      ( 06-12 @ 05:26 )             28.2<L>               9.2<L>  7.67  )-----------( 385      ( 06-11 @ 05:30 )             27.6<L>               9.0<L>  10.00 )-----------( 368      ( 06-10 @ 05:28 )             27.2<L>               10.2<L>  13.21<H> )-----------( 410<H>    ( 06-09 @ 23:50 )             30.8<L>        MEDICATIONS  (STANDING):  ciprofloxacin     Tablet 500 milliGRAM(s) Oral every 12 hours  enoxaparin Injectable 40 milliGRAM(s) SubCutaneous every 24 hours  metoprolol succinate ER 25 milliGRAM(s) Oral daily  metroNIDAZOLE    Tablet 500 milliGRAM(s) Oral every 8 hours    MEDICATIONS  (PRN):  acetaminophen     Tablet .. 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 Patient admitted with wound infection and dehiscence, currently on Cipro/Flagyl(6/10-) and states less wound pain and no subjective fevers since admission and current treatment.     -VSS and reviewed.  -UA(6/10) with pos Leuks, Urine Cx pending would collect if team has not.  -Bcx(6/9): NGTD  -LE doppler and CXR wnl (6/10)  -Would defer to ID recs for antibiotics: Doxycyline 100 mg BID, Levofloxacin 500 mg q24 for 7 day course ending 6/18/24, culture pelvic drainage if it arises again.   -GYN will continue to follow, but no acute GYN interventions at this time.    Sallie PGY2

## 2024-06-13 ENCOUNTER — TRANSCRIPTION ENCOUNTER (OUTPATIENT)
Age: 64
End: 2024-06-13

## 2024-06-13 RX ORDER — LEVOFLOXACIN 5 MG/ML
1 INJECTION, SOLUTION INTRAVENOUS
Qty: 5 | Refills: 0
Start: 2024-06-13 | End: 2024-06-17

## 2024-06-13 RX ORDER — CYCLOBENZAPRINE HYDROCHLORIDE 10 MG/1
1 TABLET, FILM COATED ORAL
Refills: 0 | DISCHARGE

## 2024-06-13 RX ADMIN — Medication 500 MILLIGRAM(S): at 05:14

## 2024-06-13 RX ADMIN — Medication 975 MILLIGRAM(S): at 13:30

## 2024-06-13 RX ADMIN — Medication 100 MILLIGRAM(S): at 17:15

## 2024-06-13 RX ADMIN — ENOXAPARIN SODIUM 40 MILLIGRAM(S): 100 INJECTION SUBCUTANEOUS at 17:15

## 2024-06-13 RX ADMIN — OXYCODONE HYDROCHLORIDE 5 MILLIGRAM(S): 5 TABLET ORAL at 22:41

## 2024-06-13 RX ADMIN — Medication 975 MILLIGRAM(S): at 06:00

## 2024-06-13 RX ADMIN — Medication 975 MILLIGRAM(S): at 14:30

## 2024-06-13 RX ADMIN — Medication 975 MILLIGRAM(S): at 05:13

## 2024-06-13 RX ADMIN — Medication 25 MILLIGRAM(S): at 05:14

## 2024-06-13 NOTE — DISCHARGE NOTE PROVIDER - NSDCFUADDINST_GEN_ALL_CORE_FT
Please take antibiotics prescribed until 6/18.     Wound care: pack wounds with guaze wet with saline. Keep for day and change daily when guaze dries. You may cover wounds with additional guaze/abd pads. Please take antibiotics prescribed until 6/18.     Wound care: pack wounds with guaze wet with saline daily. Repack as needed if packing falls out. You may intermittently switch to dry packing if wound is too wet. You may cover wounds with additional guaze/abd pads.

## 2024-06-13 NOTE — PROGRESS NOTE ADULT - SUBJECTIVE AND OBJECTIVE BOX
Plastic Surgery Progress Note (pg [facility]: [id number], NS: [phone])    SUBJECTIVE  Pt comfortable in bed. Pain well controlled, no complaints.    OBJECTIVE  ___________________________________________________  VITAL SIGNS / I&O's   Vital Signs Last 24 Hrs  T(C): 36.4 (13 Jun 2024 01:42), Max: 37 (12 Jun 2024 17:40)  T(F): 97.6 (13 Jun 2024 01:42), Max: 98.6 (12 Jun 2024 17:40)  HR: 68 (13 Jun 2024 01:42) (65 - 80)  BP: 119/63 (13 Jun 2024 01:42) (118/59 - 137/67)  BP(mean): --  RR: 18 (13 Jun 2024 01:42) (17 - 18)  SpO2: 100% (13 Jun 2024 01:42) (97% - 100%)    Parameters below as of 13 Jun 2024 01:42  Patient On (Oxygen Delivery Method): room air          ___________________________________________________  PHYSICAL EXAM    General: NAD    ___________________________________________________  LABS                        9.0    5.74  )-----------( 387      ( 12 Jun 2024 05:26 )             28.2     12 Jun 2024 05:26    142    |  106    |  9      ----------------------------<  94     4.0     |  25     |  0.74     Ca    8.7        12 Jun 2024 05:26  Phos  3.2       12 Jun 2024 05:26  Mg     2.10      12 Jun 2024 05:26        CAPILLARY BLOOD GLUCOSE            Urinalysis Basic - ( 12 Jun 2024 05:26 )    Color: x / Appearance: x / SG: x / pH: x  Gluc: 94 mg/dL / Ketone: x  / Bili: x / Urobili: x   Blood: x / Protein: x / Nitrite: x   Leuk Esterase: x / RBC: x / WBC x   Sq Epi: x / Non Sq Epi: x / Bacteria: x      ___________________________________________________  MICRO  Recent Cultures:  Specimen Source: .Blood Blood-Peripheral, 06-09 @ 23:55; Results   No growth at 48 Hours; Gram Stain: --; Organism: --  Specimen Source: .Blood Blood-Peripheral, 06-09 @ 23:40; Results   No growth at 48 Hours; Gram Stain: --; Organism: --    ___________________________________________________  MEDICATIONS  (STANDING):  doxycycline monohydrate Capsule 100 milliGRAM(s) Oral every 12 hours  enoxaparin Injectable 40 milliGRAM(s) SubCutaneous every 24 hours  levoFLOXacin  Tablet 500 milliGRAM(s) Oral every 24 hours  metoprolol succinate ER 25 milliGRAM(s) Oral daily    MEDICATIONS  (PRN):  acetaminophen     Tablet .. 975 milliGRAM(s) Oral every 6 hours PRN Mild Pain (1 - 3)  oxyCODONE    IR 5 milliGRAM(s) Oral every 6 hours PRN Moderate Pain (4 - 6)  oxyCODONE    IR 10 milliGRAM(s) Oral every 6 hours PRN Severe Pain (7 - 10)   Plastic Surgery Progress Note (pg [facility]: [id number], NS: [phone])    SUBJECTIVE  NAEO. AVSS. Pt comfortable in bed. Pain well controlled, no complaints.    OBJECTIVE  ___________________________________________________  VITAL SIGNS / I&O's   Vital Signs Last 24 Hrs  T(C): 36.4 (13 Jun 2024 01:42), Max: 37 (12 Jun 2024 17:40)  T(F): 97.6 (13 Jun 2024 01:42), Max: 98.6 (12 Jun 2024 17:40)  HR: 68 (13 Jun 2024 01:42) (65 - 80)  BP: 119/63 (13 Jun 2024 01:42) (118/59 - 137/67)  BP(mean): --  RR: 18 (13 Jun 2024 01:42) (17 - 18)  SpO2: 100% (13 Jun 2024 01:42) (97% - 100%)    Parameters below as of 13 Jun 2024 01:42  Patient On (Oxygen Delivery Method): room air          ___________________________________________________  PHYSICAL EXAM    : small area of dehiscence with erythema. Small amount of drainage and mild fibrinous exudate. packed with dakins and covered with abd.  posterior thigh with 1cm area of dehiscence medially and laterally. packed with dry guaze  ___________________________________________________  LABS                        9.0    5.74  )-----------( 387      ( 12 Jun 2024 05:26 )             28.2     12 Jun 2024 05:26    142    |  106    |  9      ----------------------------<  94     4.0     |  25     |  0.74     Ca    8.7        12 Jun 2024 05:26  Phos  3.2       12 Jun 2024 05:26  Mg     2.10      12 Jun 2024 05:26        CAPILLARY BLOOD GLUCOSE            Urinalysis Basic - ( 12 Jun 2024 05:26 )    Color: x / Appearance: x / SG: x / pH: x  Gluc: 94 mg/dL / Ketone: x  / Bili: x / Urobili: x   Blood: x / Protein: x / Nitrite: x   Leuk Esterase: x / RBC: x / WBC x   Sq Epi: x / Non Sq Epi: x / Bacteria: x      ___________________________________________________  MICRO  Recent Cultures:  Specimen Source: .Blood Blood-Peripheral, 06-09 @ 23:55; Results   No growth at 48 Hours; Gram Stain: --; Organism: --  Specimen Source: .Blood Blood-Peripheral, 06-09 @ 23:40; Results   No growth at 48 Hours; Gram Stain: --; Organism: --    ___________________________________________________  MEDICATIONS  (STANDING):  doxycycline monohydrate Capsule 100 milliGRAM(s) Oral every 12 hours  enoxaparin Injectable 40 milliGRAM(s) SubCutaneous every 24 hours  levoFLOXacin  Tablet 500 milliGRAM(s) Oral every 24 hours  metoprolol succinate ER 25 milliGRAM(s) Oral daily    MEDICATIONS  (PRN):  acetaminophen     Tablet .. 975 milliGRAM(s) Oral every 6 hours PRN Mild Pain (1 - 3)  oxyCODONE    IR 5 milliGRAM(s) Oral every 6 hours PRN Moderate Pain (4 - 6)  oxyCODONE    IR 10 milliGRAM(s) Oral every 6 hours PRN Severe Pain (7 - 10)

## 2024-06-13 NOTE — PROGRESS NOTE ADULT - ASSESSMENT
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 Malone last week where small dehiscence was noted and advised to place medihoney. over the past day, noted that there was increasing discharge from wound, and felt fevers, so came to ED. Denies chills, nvd, abd pain, or urinary incontinence / dysuria. Afebrile all stay. Negative fever workup    - WBC down to 5  - Continue wound care with saline wet to dry, stop dakins 6/12  - Regular diet  - ID: doxy/levo  - Continue DVT ppx  - Dispo: continue care on floor    Fabiola Araiza MD  Plastic and Reconstructive Surgery, PGY-1  LIMARY: [id number]   64 y/o female with history of carcinoma in situ of vulva sp resection and VY reconstruction by plastic surgery on 5/6, she most recently had a visit with Dr. Malone last week where small dehiscence was noted and advised to place medihoney. over the past day, noted that there was increasing discharge from wound, and felt fevers, so came to ED. Denies chills, nvd, abd pain, or urinary incontinence / dysuria. Afebrile all stay. Negative fever workup    - WBC down to 5  - Continue wound care with saline wet to dry, stop dakins 6/12  - Regular diet  - ID: doxy/levo  - Continue DVT ppx  - Dispo: continue care on floor, set up VNS    Fabiola Araiza MD  Plastic and Reconstructive Surgery, PGY-1  LIMARY: [id number]

## 2024-06-13 NOTE — DISCHARGE NOTE PROVIDER - CARE PROVIDER_API CALL
Will Malone  Plastic Surgery  [address] [place] [address], [address]  [place] [address]  Phone: [phone]  Fax: [phone]  Follow Up Time:

## 2024-06-13 NOTE — DISCHARGE NOTE PROVIDER - NSDCFUSCHEDAPPT_GEN_ALL_CORE_FT
Fanta Sanders  [facility] Physician [facility]  [facility]  Scheduled Appointment: 07/16/2024    Carol Carreon  [facility] Physician [facility]  [facility] [address] [facility]  Scheduled Appointment: 07/18/2024

## 2024-06-13 NOTE — DISCHARGE NOTE PROVIDER - HOSPITAL COURSE
63F history of vulvectomy, V to Y flap on 5/6 admitted 6/9 for concern of wound breakdown and infection. Fever work up negative. Afebrile throughout hospital stay. Wound was packed with dakins wet to dry for 3 days and then transitioned to saline wet to dry. IV antibiotics was transitioned to oral.

## 2024-06-13 NOTE — DISCHARGE NOTE PROVIDER - NSDCMRMEDTOKEN_GEN_ALL_CORE_FT
doxycycline monohydrate 50 mg oral capsule: 2 cap(s) orally every 12 hours MDD: 2  levoFLOXacin 500 mg oral tablet: 1 tab(s) orally once a day MDD: 2  meloxicam 7.5 mg oral tablet: 1 tab(s) orally prn as needed for  moderate pain last dose 3/26/24  metoprolol succinate 25 mg oral capsule, extended release: 1 cap(s) orally once a day am   doxycycline hyclate 100 mg oral tablet: 1 tab(s) orally 2 times a day MDD: 2  levoFLOXacin 500 mg oral tablet: 1 tab(s) orally once a day MDD: 2  meloxicam 7.5 mg oral tablet: 1 tab(s) orally prn as needed for  moderate pain last dose 3/26/24  metoprolol succinate 25 mg oral capsule, extended release: 1 cap(s) orally once a day am

## 2024-06-14 ENCOUNTER — APPOINTMENT (OUTPATIENT)
Dept: PLASTIC SURGERY | Facility: CLINIC | Age: 64
End: 2024-06-14

## 2024-06-14 ENCOUNTER — TRANSCRIPTION ENCOUNTER (OUTPATIENT)
Age: 64
End: 2024-06-14

## 2024-06-14 VITALS
HEART RATE: 60 BPM | DIASTOLIC BLOOD PRESSURE: 54 MMHG | TEMPERATURE: 98 F | SYSTOLIC BLOOD PRESSURE: 108 MMHG | OXYGEN SATURATION: 99 % | RESPIRATION RATE: 18 BRPM

## 2024-06-14 RX ADMIN — Medication 975 MILLIGRAM(S): at 11:51

## 2024-06-14 RX ADMIN — Medication 100 MILLIGRAM(S): at 06:36

## 2024-06-14 RX ADMIN — Medication 25 MILLIGRAM(S): at 06:36

## 2024-06-14 NOTE — PROGRESS NOTE ADULT - REASON FOR ADMISSION
wound dehiscence
Substance use and unable to be assessed

## 2024-06-14 NOTE — PROGRESS NOTE ADULT - ASSESSMENT
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 Malone last week where small dehiscence was noted and advised to place medihoney. over the past day, noted that there was increasing discharge from wound, and felt fevers, so came to ED. Denies chills, nvd, abd pain, or urinary incontinence / dysuria. Afebrile all stay. Negative fever workup    - WBC wnl and stable  - Continue wound care with saline wet to dry  - Regular diet  - ID: doxy/levo  - Continue DVT ppx  - Dispo: plan for DC today if VNS set up    [facility]  Plastic Surgery  #[phone] [facility] pager  [phone] [facility] pager  Available on teams

## 2024-06-14 NOTE — DISCHARGE NOTE NURSING/CASE MANAGEMENT/SOCIAL WORK - NSDCPEFALRISK_GEN_ALL_CORE
For information on Fall & Injury Prevention, visit: [email or web address] OR  [email or web address] OR  https://www.cdc.gov/steadi/patient.html

## 2024-06-14 NOTE — DISCHARGE NOTE NURSING/CASE MANAGEMENT/SOCIAL WORK - PATIENT PORTAL LINK FT
You can access the FollowMyHealth Patient Portal offered by [facility] by registering at the following website: [email or web address]. By joining [employer]’s FollowMyHealth portal, you will also be able to view your health information using other applications (apps) compatible with our system.

## 2024-06-14 NOTE — PROGRESS NOTE ADULT - SUBJECTIVE AND OBJECTIVE BOX
Plastic Surgery Progress Note (pg [facility]: [id number], [facility]: [phone])    SUBJECTIVE  NAEO. AVSS. Pt comfortable in bed. Pain well controlled, no complaints. Dressing changed this AM.    OBJECTIVE  ___________________________________________________  VITAL SIGNS / I&O's   Vital Signs Last 24 Hrs  T(C): 36.7 (14 Jun 2024 07:18), Max: 36.7 (13 Jun 2024 09:09)  T(F): 98.1 (14 Jun 2024 07:18), Max: 98.1 (13 Jun 2024 13:31)  HR: 71 (14 Jun 2024 07:18) (67 - 72)  BP: 141/70 (14 Jun 2024 07:18) (119/65 - 143/66)  BP(mean): --  ABP: --  ABP(mean): --  RR: 18 (14 Jun 2024 07:18) (17 - 18)  SpO2: 99% (14 Jun 2024 07:18) (98% - 100%)    O2 Parameters below as of 14 Jun 2024 07:18  Patient On (Oxygen Delivery Method): room air      I&O's Detail    13 Jun 2024 07:01  -  14 Jun 2024 07:00  --------------------------------------------------------  IN:  Total IN: 0 mL    OUT:    Voided (mL): 200 mL  Total OUT: 200 mL    Total NET: -200 mL    ___________________________________________________  PHYSICAL EXAM    : small area of dehiscence without erythema. Small amount of drainage and mild fibrinous exudate. packed with dakins and covered with abd.  posterior thigh with 1cm area of dehiscence medially and laterally. packed with dry guaze  ___________________________________________________  LABS                        9.0    5.74  )-----------( 387      ( 12 Jun 2024 05:26 )             28.2     12 Jun 2024 05:26    142    |  106    |  9      ----------------------------<  94     4.0     |  25     |  0.74     Ca    8.7        12 Jun 2024 05:26  Phos  3.2       12 Jun 2024 05:26  Mg     2.10      12 Jun 2024 05:26        CAPILLARY BLOOD GLUCOSE            Urinalysis Basic - ( 12 Jun 2024 05:26 )    Color: x / Appearance: x / SG: x / pH: x  Gluc: 94 mg/dL / Ketone: x  / Bili: x / Urobili: x   Blood: x / Protein: x / Nitrite: x   Leuk Esterase: x / RBC: x / WBC x   Sq Epi: x / Non Sq Epi: x / Bacteria: x      ___________________________________________________  MICRO  Recent Cultures:  Specimen Source: .Blood Blood-Peripheral, 06-09 @ 23:55; Results   No growth at 48 Hours; Gram Stain: --; Organism: --  Specimen Source: .Blood Blood-Peripheral, 06-09 @ 23:40; Results   No growth at 48 Hours; Gram Stain: --; Organism: --    ___________________________________________________  MEDICATIONS  (STANDING):  doxycycline monohydrate Capsule 100 milliGRAM(s) Oral every 12 hours  enoxaparin Injectable 40 milliGRAM(s) SubCutaneous every 24 hours  levoFLOXacin  Tablet 500 milliGRAM(s) Oral every 24 hours  metoprolol succinate ER 25 milliGRAM(s) Oral daily    MEDICATIONS  (PRN):  acetaminophen     Tablet .. 975 milliGRAM(s) Oral every 6 hours PRN Mild Pain (1 - 3)  oxyCODONE    IR 5 milliGRAM(s) Oral every 6 hours PRN Moderate Pain (4 - 6)  oxyCODONE    IR 10 milliGRAM(s) Oral every 6 hours PRN Severe Pain (7 - 10)

## 2024-06-14 NOTE — DISCHARGE NOTE NURSING/CASE MANAGEMENT/SOCIAL WORK - NSDCPNINST_GEN_ALL_CORE
Notify Dr Malone if you experience any increase in pain not relieved with medication any Notify Dr Malone if you experience any increase in pain not relieved with medication any increase in drainage from incision any redness, or odor or if you develop a fever >100.5.  drink plenty of fluids.  no heavy lifting or straining, keep your dressings clean and dry.  Complete your course of antibiotics as prescribed.

## 2024-06-15 LAB
CULTURE RESULTS: SIGNIFICANT CHANGE UP
CULTURE RESULTS: SIGNIFICANT CHANGE UP
SPECIMEN SOURCE: SIGNIFICANT CHANGE UP
SPECIMEN SOURCE: SIGNIFICANT CHANGE UP

## 2024-06-18 ENCOUNTER — APPOINTMENT (OUTPATIENT)
Dept: PLASTIC SURGERY | Facility: CLINIC | Age: 64
End: 2024-06-18
Payer: COMMERCIAL

## 2024-06-18 VITALS
OXYGEN SATURATION: 97 % | HEART RATE: 95 BPM | BODY MASS INDEX: 19.11 KG/M2 | SYSTOLIC BLOOD PRESSURE: 152 MMHG | HEIGHT: 69.8 IN | TEMPERATURE: 208.94 F | DIASTOLIC BLOOD PRESSURE: 74 MMHG | WEIGHT: 132 LBS

## 2024-06-18 PROCEDURE — 99024 POSTOP FOLLOW-UP VISIT: CPT

## 2024-06-18 NOTE — HISTORY OF PRESENT ILLNESS
[FreeTextEntry1] :  Pt is a 63 year female s/p vulvar reconstruction with gluteal fold v to y advancement flap DOS 5/6/24. Patient accompanied by family member. Pt reported being admitted into the hospital last week for infection. She finished her course of PO abx. She reports doing wound care twice a day. Pt doing well. No further complaints at this time. Denies fever, chills, pain, rash.

## 2024-06-18 NOTE — PHYSICAL EXAM
[de-identified] : vulvar incision mostly healed, dehiscence x2, wound bed clean with some fibrotic tissue noted, wound packed wet to dry normal saline and covered in dry sterile dress, swelling improving, erythema improving

## 2024-06-25 ENCOUNTER — APPOINTMENT (OUTPATIENT)
Dept: PLASTIC SURGERY | Facility: CLINIC | Age: 64
End: 2024-06-25
Payer: COMMERCIAL

## 2024-06-25 VITALS
OXYGEN SATURATION: 98 % | TEMPERATURE: 208.4 F | HEIGHT: 69.8 IN | HEART RATE: 87 BPM | SYSTOLIC BLOOD PRESSURE: 134 MMHG | DIASTOLIC BLOOD PRESSURE: 79 MMHG

## 2024-06-25 PROCEDURE — 99024 POSTOP FOLLOW-UP VISIT: CPT

## 2024-07-02 ENCOUNTER — APPOINTMENT (OUTPATIENT)
Dept: PLASTIC SURGERY | Facility: CLINIC | Age: 64
End: 2024-07-02
Payer: COMMERCIAL

## 2024-07-02 VITALS
HEIGHT: 61 IN | BODY MASS INDEX: 23.41 KG/M2 | HEART RATE: 70 BPM | TEMPERATURE: 208.76 F | SYSTOLIC BLOOD PRESSURE: 159 MMHG | DIASTOLIC BLOOD PRESSURE: 79 MMHG | OXYGEN SATURATION: 99 % | WEIGHT: 124 LBS

## 2024-07-02 PROBLEM — C51.9 CARCINOMA OF VULVA: Status: ACTIVE | Noted: 2024-02-27

## 2024-07-02 PROCEDURE — 99024 POSTOP FOLLOW-UP VISIT: CPT

## 2024-07-09 ENCOUNTER — APPOINTMENT (OUTPATIENT)
Dept: PLASTIC SURGERY | Facility: CLINIC | Age: 64
End: 2024-07-09
Payer: COMMERCIAL

## 2024-07-09 VITALS
WEIGHT: 124 LBS | BODY MASS INDEX: 23.41 KG/M2 | OXYGEN SATURATION: 99 % | DIASTOLIC BLOOD PRESSURE: 81 MMHG | SYSTOLIC BLOOD PRESSURE: 145 MMHG | HEIGHT: 61 IN | HEART RATE: 82 BPM | TEMPERATURE: 209.84 F

## 2024-07-09 PROCEDURE — 99024 POSTOP FOLLOW-UP VISIT: CPT

## 2024-07-09 RX ORDER — MUPIROCIN 20 MG/G
2 OINTMENT TOPICAL
Qty: 1 | Refills: 1 | Status: ACTIVE | COMMUNITY
Start: 2024-07-09 | End: 1900-01-01

## 2024-07-11 ENCOUNTER — OUTPATIENT (OUTPATIENT)
Dept: OUTPATIENT SERVICES | Facility: HOSPITAL | Age: 64
LOS: 1 days | Discharge: HOME | End: 2024-07-11

## 2024-07-11 DIAGNOSIS — Z98.890 OTHER SPECIFIED POSTPROCEDURAL STATES: Chronic | ICD-10-CM

## 2024-07-11 DIAGNOSIS — C51.9 MALIGNANT NEOPLASM OF VULVA, UNSPECIFIED: ICD-10-CM

## 2024-07-11 DIAGNOSIS — Z90.79 ACQUIRED ABSENCE OF OTHER GENITAL ORGAN(S): Chronic | ICD-10-CM

## 2024-07-16 ENCOUNTER — APPOINTMENT (OUTPATIENT)
Dept: PLASTIC SURGERY | Facility: CLINIC | Age: 64
End: 2024-07-16
Payer: COMMERCIAL

## 2024-07-16 VITALS
HEIGHT: 61 IN | BODY MASS INDEX: 23.41 KG/M2 | DIASTOLIC BLOOD PRESSURE: 84 MMHG | OXYGEN SATURATION: 98 % | WEIGHT: 124 LBS | SYSTOLIC BLOOD PRESSURE: 157 MMHG | TEMPERATURE: 209.84 F | HEART RATE: 87 BPM

## 2024-07-16 PROCEDURE — 99024 POSTOP FOLLOW-UP VISIT: CPT

## 2024-07-23 ENCOUNTER — APPOINTMENT (OUTPATIENT)
Dept: PLASTIC SURGERY | Facility: CLINIC | Age: 64
End: 2024-07-23
Payer: COMMERCIAL

## 2024-07-23 VITALS
SYSTOLIC BLOOD PRESSURE: 163 MMHG | HEIGHT: 61 IN | BODY MASS INDEX: 23.41 KG/M2 | OXYGEN SATURATION: 98 % | WEIGHT: 124 LBS | TEMPERATURE: 209.12 F | HEART RATE: 83 BPM | DIASTOLIC BLOOD PRESSURE: 79 MMHG

## 2024-07-23 DIAGNOSIS — C51.9 MALIGNANT NEOPLASM OF VULVA, UNSPECIFIED: ICD-10-CM

## 2024-07-23 PROCEDURE — 99024 POSTOP FOLLOW-UP VISIT: CPT

## 2024-07-23 NOTE — HISTORY OF PRESENT ILLNESS
[FreeTextEntry1] :  Pt is a 63 year female s/p vulvar reconstruction with gluteal fold v to y advancement flap DOS 5/6/24. Patient accompanied by family member. Pt doing well. She is doing wound care with medihoney. No further complaints at this time. Denies fever, chills, pain, rash.

## 2024-07-23 NOTE — PHYSICAL EXAM
[de-identified] :  vulvar incision mostly healed, dehiscence x2, wound bed clean with some fibrotic tissue noted, wound tunnel 2cm superior, wound packed with medihoney and covered in dry sterile dress, swelling improving

## 2024-08-06 ENCOUNTER — APPOINTMENT (OUTPATIENT)
Dept: PLASTIC SURGERY | Facility: CLINIC | Age: 64
End: 2024-08-06

## 2024-08-06 ENCOUNTER — APPOINTMENT (OUTPATIENT)
Dept: GYNECOLOGIC ONCOLOGY | Facility: CLINIC | Age: 64
End: 2024-08-06

## 2024-08-06 PROCEDURE — 99212 OFFICE O/P EST SF 10 MIN: CPT

## 2024-08-06 PROCEDURE — 99459 PELVIC EXAMINATION: CPT

## 2024-08-06 PROCEDURE — 99213 OFFICE O/P EST LOW 20 MIN: CPT

## 2024-08-06 NOTE — PHYSICAL EXAM
[de-identified] : vulvar incision healed, dehiscence x2, wound bed clean with some fibrotic tissue noted, wound tunnel 2cm superior, wound packed with medihoney and covered in dry sterile dress, swelling improving, no sign of gross infection

## 2024-08-14 PROBLEM — C51.9 CARCINOMA OF VULVA: Noted: 2024-02-27

## 2024-08-14 NOTE — PHYSICAL EXAM
[Chaperone Present] : A chaperone was present in the examining room during all aspects of the physical examination [[id number]] : A chaperone was present during the pelvic exam. [FreeTextEntry2] : Nikky [Abnormal] : External genitalia: Abnormal [Normal] : Anus and perineum: Normal sphincter tone, no masses, no prolapse. [de-identified] : Incisions healed well, residual open areas granulating well, no cellulitis

## 2024-08-14 NOTE — DISCUSSION/SUMMARY
[Reviewed Clinical Lab Test(s)] : Results of clinical tests were reviewed. [Reviewed Radiology Report(s)] : Radiology reports were reviewed. [Discuss Alternatives/Risks/Benefits w/Patient] : All alternatives, risks, and benefits were discussed with the patient/family and all questions were answered.  Patient expressed good understanding and appreciates the importance of follow up as recommended. [Discuss Tests w/Referring Providers] : Results of labs/radiology studies and the treatment recommendations were discussed with performing/referring physician. [FreeTextEntry1] : - we reviewed her clinical course - discussed the preop imaging with concern for possible bone metastasis; followup imaging is recommended however we will defer this as she continues to heal, due to chronic inflammatory changes that will confound the imaging results. - The risk of recurrence, signs and symptoms and surveillance plan were reviewed in detail.  - HM reviewed. - She was advised to f/u with Dr. Malone and Cecy Hoff, and to see me in 4 months.  - All questions were answered to her apparent satisfaction.

## 2024-08-14 NOTE — HISTORY OF PRESENT ILLNESS
[FreeTextEntry1] : Ms. RASHAUN MATT initially presented for evaluation of TATYANA 3 in 10/2020 and did not return for followup until 6/2022 when she had developed invasive vulvar SCC..  Endometrial biopsy was reportedly postponed secondary to pandemic. Pelvic US in 2019 showed an endometrial lining of 3-4 mm. 3/11/2019, Pap Smear: HPVHR mRNA not detected, negative for intraepithelial lesion or malignancy 9/18/2019, Pelvic sonogram - Uterus: 5.2 x 2.5 x 4.3 cm; EM 3-4 mm with a tiny amount of fluid Right ovary: 2.3 x 1.3 x 1.0 cm ; Left ovary: 1.8 x 0.9 x 1.8 cm 9/11/2020, [facility], vulvar lesion: TATYANA 3, nonkeratinizing type, extending to the tissue edges. 10/1/20: Clitoris lesion, biopsy: VIN2 ------------------------------- 6/23/22: Vulvar mass identified on exam; biopsy confirmed SCC 6/30/22: Pelvic MRI- Approximately 5.5 x 4.8 x 2.5 cm vulvar mass, asymmetrically greater to the left of midline, extends to the anterior lower vagina and distal urethra. Vagina is distended by gel which was administered for this exam. LYMPH NODES: Three adjacent abnormal right inguinal lymph nodes measuring up to 1.8 x 1.3 cm 6/30/22: PET/CT - IMPRESSION: Abnormal FDG-PET/CT scan. 1. FDG avidity in bilateral vulva compatible with newly diagnosed malignancy, with extension of FDG avidity into the vaginal region concerning for disease involvement. 2. FDG avid right inguinal lymph nodes compatible with metastases. Mildly FDG avid single left inguinal lymph node concerning for additional metastatic disease; this may be further evaluated with ultrasound and possible FNA biopsy as indicated. 3. Nonspecific mildly asymmetric FDG activity in the right base of tongue. Correlate clinically or with direct visualization.  She initiated chemoradiation with Dr. Carreon and Dr. Sanders. She received a total dose of 6400 cGy to the pelvis/vulva/inguinal lymph node with a cone down. Post treatment MRI 11/23/22: complete vs near complete treatment response; the vulvar mass measuring 2.4 x 0.8 cm markedly decrease in size and predominantly fibrotic, with areas equivocal for small residual disease. Interval decrease in right inguinal adenopathy now 1.0 x 0.6 cm previously 1.8 x 1.3 cm.  Due to pelvic pain, a pelvic MRI was ordered. 3/2023- Bony Pelvis MRI- Acute bilateral sacral alar insufficiency fractures with extension of fracture lines into the S1 and S2 vertebral bodies. Extensive edema along the iliac aspect of the sacroiliac joints bilaterally, which may be related to secondary degenerative changes of the sacroiliac joints. However, underlying insufficiency fractures can not be excluded. Incompletely evaluated edema with T1 marrow placement within the LEFT pubic body, which may represent an additional fracture. However, an underlying pathologic marrow replacing lesion within the LEFT pubic body, such as metastatic disease, cannot be excluded and is also within the differential. Consider short-term interval follow-up MRI of the bony pelvis with full field of view for further evaluation. Questionable acute nondisplaced fracture of the RIGHT pubic body, which is incompletely evaluated on this exam, as it is only seen on the sagittal images. Edema within the transverse processes of L5 bilaterally, which may be related to nondisplaced fractures or be related to enthesopathic changes.  She saw OrthoDr. Mesa- Patient has insufficiency fractures in her sacrum which are not neurologically compromising. This is most likely secondary to RT. Recommended physical therapy and/or see physiatry and follow-up again with x-rays in 4 months or she can follow-up with neurosurgery as well with regard to these lesions.  She had some mild residual tenderness in the periclitoral area, and occasional spotting. 5/25/23- Lesion seen on routine followup exam - Right vulva and Left vulva biopsy- dVIN. 5/27/23- PET/CT- 1. Recently described suspected lesion in the LEFT pubic ramus is new since the prior PET/CT and demonstrates associated uptake. Metastatic disease is not excluded. 2. Sacroiliac findings again consistent with insufficiency fractures. A fracture line is more readily apparent on the RIGHT compared to the prior exam. However, uptake associated with these fractures is now reduced. 3. Foci in the precarinal and BILATERAL hilar regions in the chest which were previously seen have now resolved. 4. Uptake at the perineum is likely contamination.. Otherwise, no new suspicious uptake at vulva or inguinal lymph nodes.  Ortho deemed suspected lesion not to be of concern.  8/2023- Vulvectomy- . Lisa-clitoral lesion, right, excision - Invasive moderately differentiated squamous cell carcinoma (depth of invasion 2 mm), see synoptic summary - Differentiated vulvar intraepithelial neoplasia involves lateral and medial margins 2. Distal clitoris and left labia minora, excision - Invasive moderately differentiated squamous cell carcinoma (depth of invasion 4 mm), see synoptic summary - Differentiated vulvar intraepithelial neoplasia (Differentiated TATYANA) involves lateral, medial and deep margins  Incisional dehiscence was noted on postoperative exam, but eventually healed by secondary intention.  Tumor Board Recommendation: Diagnosis: Recurrent vulvar cancer Recommendation: MRI with bone sequence to evaluate lesion on pubic ramus seen on PET/CT; Pending imaging, modified anterior radical vulvectomy with flap reconstruction  She saw Dr. Malone for discussion of further surgery.. 11/2023- PET/CT- 1. Elongated activity localizing to both sides of the vulva likely a combination urine contamination and postsurgical process; please correlate with findings on direct examination. No abnormal activity in the uterus or cervix. No hypermetabolic pelvic sidewall or retroperitoneal nodes. 2. Healing fractures in the left superior pubic ramus and bilateral sacral alae with nonspecific patches of FDG activity, likely reflective of nonspecific bone healing process. 3. Bone changes in the left iliac bone including new lucent and sclerotic changes near the iliac crest with increased activity; although findings may also be posttraumatic in the background of posttherapy changes, malignancy is a concern in light of recent MRI findings. Consider tissue evaluation for more definitive assessment.  11/2023- Pelvic MRI- Increased marrow signal abnormality within the left iliac bone which extends into the left iliac crest and is suspicious for osseous metastatic disease. Multiple insufficiency fractures as above. 12/2023- BONE, ILIAC, LEFT, CT GUIDED CORE BIOPSY- NON DIAGNOSTIC Benign bone present.  ***5/6/24 She underwent radical vulvectomy with resection of clitoris with advancement flap reconstruction by Dr. Malone. 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 - AJCC Stage: ypT1b, pNx, pMx  Pt discussed at GYN ONC Tumor Board on 5/29/24 Diagnosis: Stage IB Grade 2 Invasive squamous cell carcinoma, moderately differentiated Recommendation: observation  Postoperatively she was admitted with incisional complications and celulitis, under Dr. Andrew care.  She is currently continuing to heal well and returns for followup exam. She denies any VB, vulvar pain or any other associated signs or symptoms.   Health Maintenance: Mammo- 8/2022 BIRADS2 Colonoscopy- 2015, normal (first colonoscopy had a tubular adenoma)  Referring MD- Dr. Renetta Thompson (hasn't seen since 2020) Med Onc: Dr. Tommy Garrido Onc: Dr. Carreon Plastic surgeon: Dr. Malone PCP- Dr. Ron Coburn GI- Dr. Stevie Wang (retired) - Dr. Goldberg

## 2024-08-14 NOTE — PHYSICAL EXAM
[Chaperone Present] : A chaperone was present in the examining room during all aspects of the physical examination [[id number]] : A chaperone was present during the pelvic exam. [FreeTextEntry2] : Nikky [Abnormal] : External genitalia: Abnormal [Normal] : Anus and perineum: Normal sphincter tone, no masses, no prolapse. [de-identified] : Incisions healed well, residual open areas granulating well, no cellulitis

## 2024-08-14 NOTE — HISTORY OF PRESENT ILLNESS
[FreeTextEntry1] : Ms. RASHAUN MATT initially presented for evaluation of TATYANA 3 in 10/2020 and did not return for followup until 6/2022 when she had developed invasive vulvar SCC..  Endometrial biopsy was reportedly postponed secondary to pandemic. Pelvic US in 2019 showed an endometrial lining of 3-4 mm. 3/11/2019, Pap Smear: HPVHR mRNA not detected, negative for intraepithelial lesion or malignancy 9/18/2019, Pelvic sonogram - Uterus: 5.2 x 2.5 x 4.3 cm; EM 3-4 mm with a tiny amount of fluid Right ovary: 2.3 x 1.3 x 1.0 cm ; Left ovary: 1.8 x 0.9 x 1.8 cm [facility]11/2020, [facility], vulvar lesion: TATYANA 3, nonkeratinizing type, extending to the tissue edges. 10/1/20: Clitoris lesion, biopsy: VIN2 ------------------------------- 6/23/22: Vulvar mass identified on exam; biopsy confirmed SCC 6/30/22: Pelvic MRI- Approximately 5.5 x 4.8 x 2.5 cm vulvar mass, asymmetrically greater to the left of midline, extends to the anterior lower vagina and distal urethra. Vagina is distended by gel which was administered for this exam. LYMPH NODES: Three adjacent abnormal right inguinal lymph nodes measuring up to 1.8 x 1.3 cm 6/30/22: PET/CT - IMPRESSION: Abnormal FDG-PET/CT scan. 1. FDG avidity in bilateral vulva compatible with newly diagnosed malignancy, with extension of FDG avidity into the vaginal region concerning for disease involvement. 2. FDG avid right inguinal lymph nodes compatible with metastases. Mildly FDG avid single left inguinal lymph node concerning for additional metastatic disease; this may be further evaluated with ultrasound and possible FNA biopsy as indicated. 3. Nonspecific mildly asymmetric FDG activity in the right base of tongue. Correlate clinically or with direct visualization.  She initiated chemoradiation with Dr. Carreon and Dr. Sanders. She received a total dose of 6400 cGy to the pelvis/vulva/inguinal lymph node with a cone down. Post treatment MRI 11/23/22: complete vs near complete treatment response; the vulvar mass measuring 2.4 x 0.8 cm markedly decrease in size and predominantly fibrotic, with areas equivocal for small residual disease. Interval decrease in right inguinal adenopathy now 1.0 x 0.6 cm previously 1.8 x 1.3 cm.  Due to pelvic pain, a pelvic MRI was ordered. 3/2023- Bony Pelvis MRI- Acute bilateral sacral alar insufficiency fractures with extension of fracture lines into the S1 and S2 vertebral bodies. Extensive edema along the iliac aspect of the sacroiliac joints bilaterally, which may be related to secondary degenerative changes of the sacroiliac joints. However, underlying insufficiency fractures can not be excluded. Incompletely evaluated edema with T1 marrow placement within the LEFT pubic body, which may represent an additional fracture. However, an underlying pathologic marrow replacing lesion within the LEFT pubic body, such as metastatic disease, cannot be excluded and is also within the differential. Consider short-term interval follow-up MRI of the bony pelvis with full field of view for further evaluation. Questionable acute nondisplaced fracture of the RIGHT pubic body, which is incompletely evaluated on this exam, as it is only seen on the sagittal images. Edema within the transverse processes of L5 bilaterally, which may be related to nondisplaced fractures or be related to enthesopathic changes.  She saw OrthoDr. Mesa- Patient has insufficiency fractures in her sacrum which are not neurologically compromising. This is most likely secondary to RT. Recommended physical therapy and/or see physiatry and follow-up again with x-rays in 4 months or she can follow-up with neurosurgery as well with regard to these lesions.  She had some mild residual tenderness in the periclitoral area, and occasional spotting. 5/25/23- Lesion seen on routine followup exam - Right vulva and Left vulva biopsy- dVIN. 5/27/23- PET/CT- 1. Recently described suspected lesion in the LEFT pubic ramus is new since the prior PET/CT and demonstrates associated uptake. Metastatic disease is not excluded. 2. Sacroiliac findings again consistent with insufficiency fractures. A fracture line is more readily apparent on the RIGHT compared to the prior exam. However, uptake associated with these fractures is now reduced. 3. Foci in the precarinal and BILATERAL hilar regions in the chest which were previously seen have now resolved. 4. Uptake at the perineum is likely contamination.. Otherwise, no new suspicious uptake at vulva or inguinal lymph nodes.  Ortho deemed suspected lesion not to be of concern.  8/2023- Vulvectomy- . Lisa-clitoral lesion, right, excision - Invasive moderately differentiated squamous cell carcinoma (depth of invasion 2 mm), see synoptic summary - Differentiated vulvar intraepithelial neoplasia involves lateral and medial margins 2. Distal clitoris and left labia minora, excision - Invasive moderately differentiated squamous cell carcinoma (depth of invasion 4 mm), see synoptic summary - Differentiated vulvar intraepithelial neoplasia (Differentiated TATYANA) involves lateral, medial and deep margins  Incisional dehiscence was noted on postoperative exam, but eventually healed by secondary intention.  Tumor Board Recommendation: Diagnosis: Recurrent vulvar cancer Recommendation: MRI with bone sequence to evaluate lesion on pubic ramus seen on PET/CT; Pending imaging, modified anterior radical vulvectomy with flap reconstruction  She saw Dr. Malone for discussion of further surgery.. 11/2023- PET/CT- 1. Elongated activity localizing to both sides of the vulva likely a combination urine contamination and postsurgical process; please correlate with findings on direct examination. No abnormal activity in the uterus or cervix. No hypermetabolic pelvic sidewall or retroperitoneal nodes. 2. Healing fractures in the left superior pubic ramus and bilateral sacral alae with nonspecific patches of FDG activity, likely reflective of nonspecific bone healing process. 3. Bone changes in the left iliac bone including new lucent and sclerotic changes near the iliac crest with increased activity; although findings may also be posttraumatic in the background of posttherapy changes, malignancy is a concern in light of recent MRI findings. Consider tissue evaluation for more definitive assessment.  11/2023- Pelvic MRI- Increased marrow signal abnormality within the left iliac bone which extends into the left iliac crest and is suspicious for osseous metastatic disease. Multiple insufficiency fractures as above. 12/2023- BONE, ILIAC, LEFT, CT GUIDED CORE BIOPSY- NON DIAGNOSTIC Benign bone present.  ***5/6/24 She underwent radical vulvectomy with resection of clitoris with advancement flap reconstruction by Dr. Malone. 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 - AJCC Stage: ypT1b, pNx, pMx  Pt discussed at GYN ONC Tumor Board on 5/29/24 Diagnosis: Stage IB Grade 2 Invasive squamous cell carcinoma, moderately differentiated Recommendation: observation  Postoperatively she was admitted with incisional complications and celulitis, under Dr. Andrew care.  She is currently continuing to heal well and returns for followup exam. She denies any VB, vulvar pain or any other associated signs or symptoms.   Health Maintenance: Mammo- 8/2022 BIRADS2 Colonoscopy- 2015, normal (first colonoscopy had a tubular adenoma)  Referring MD- Dr. Renetta Thompson (hasn't seen since 2020) Med Onc: Dr. Tommy Garrido Onc: Dr. Carreon Plastic surgeon: Dr. Malone PCP- Dr. Ron Coburn GI- Dr. Stevie Wang (retired) - Dr. Goldberg

## 2024-08-14 NOTE — ASSESSMENT
[FreeTextEntry1] : 62y/o with recurrent vulvar SCC, s/p chemoradiation followed by anterior radical vulvectomy with negative margins, prolonged healing, progressing well.

## 2024-08-14 NOTE — DISCUSSION/SUMMARY
[Reviewed Clinical Lab Test(s)] : Results of clinical tests were reviewed. [Reviewed Radiology Report(s)] : Radiology reports were reviewed. [Discuss Tests w/Referring Providers] : Results of labs/radiology studies and the treatment recommendations were discussed with performing/referring physician. [Discuss Alternatives/Risks/Benefits w/Patient] : All alternatives, risks, and benefits were discussed with the patient/family and all questions were answered.  Patient expressed good understanding and appreciates the importance of follow up as recommended. [FreeTextEntry1] : - we reviewed her clinical course - discussed the preop imaging with concern for possible bone metastasis; followup imaging is recommended however we will defer this as she continues to heal, due to chronic inflammatory changes that will confound the imaging results. - The risk of recurrence, signs and symptoms and surveillance plan were reviewed in detail.  - HM reviewed. - She was advised to f/u with Dr. Malone and Cecy Hoff, and to see me in 4 months.  - All questions were answered to her apparent satisfaction.

## 2024-08-17 ENCOUNTER — APPOINTMENT (OUTPATIENT)
Dept: MAMMOGRAPHY | Facility: CLINIC | Age: 64
End: 2024-08-17
Payer: COMMERCIAL

## 2024-08-17 PROCEDURE — 77067 SCR MAMMO BI INCL CAD: CPT | Mod: 26

## 2024-08-17 PROCEDURE — 77063 BREAST TOMOSYNTHESIS BI: CPT | Mod: 26

## 2024-09-10 ENCOUNTER — APPOINTMENT (OUTPATIENT)
Dept: PLASTIC SURGERY | Facility: CLINIC | Age: 64
End: 2024-09-10
Payer: COMMERCIAL

## 2024-09-10 VITALS
DIASTOLIC BLOOD PRESSURE: 79 MMHG | TEMPERATURE: 208.76 F | SYSTOLIC BLOOD PRESSURE: 155 MMHG | BODY MASS INDEX: 23.6 KG/M2 | HEIGHT: 61 IN | WEIGHT: 125 LBS | HEART RATE: 59 BPM | OXYGEN SATURATION: 100 %

## 2024-09-10 DIAGNOSIS — C51.9 MALIGNANT NEOPLASM OF VULVA, UNSPECIFIED: ICD-10-CM

## 2024-09-10 PROCEDURE — 99213 OFFICE O/P EST LOW 20 MIN: CPT

## 2024-09-10 NOTE — PHYSICAL EXAM
[de-identified] : vulvar incision healed, scar noted, dehiscence x2, wound bed clean with some fibrotic tissue noted, wound tunnel 2cm superior, wound packed with medihoney and covered in dry sterile dress, swelling improving, no sign of gross infection

## 2024-10-08 ENCOUNTER — APPOINTMENT (OUTPATIENT)
Dept: PLASTIC SURGERY | Facility: CLINIC | Age: 64
End: 2024-10-08
Payer: COMMERCIAL

## 2024-10-08 VITALS
DIASTOLIC BLOOD PRESSURE: 75 MMHG | TEMPERATURE: 208.4 F | OXYGEN SATURATION: 100 % | HEART RATE: 76 BPM | SYSTOLIC BLOOD PRESSURE: 160 MMHG

## 2024-10-08 DIAGNOSIS — C51.9 MALIGNANT NEOPLASM OF VULVA, UNSPECIFIED: ICD-10-CM

## 2024-10-08 PROCEDURE — 99213 OFFICE O/P EST LOW 20 MIN: CPT

## 2024-10-14 ENCOUNTER — OUTPATIENT (OUTPATIENT)
Dept: OUTPATIENT SERVICES | Facility: HOSPITAL | Age: 64
LOS: 1 days | Discharge: HOME | End: 2024-10-14

## 2024-10-14 DIAGNOSIS — Z98.890 OTHER SPECIFIED POSTPROCEDURAL STATES: Chronic | ICD-10-CM

## 2024-10-14 DIAGNOSIS — Z90.79 ACQUIRED ABSENCE OF OTHER GENITAL ORGAN(S): Chronic | ICD-10-CM

## 2024-10-14 DIAGNOSIS — C51.9 MALIGNANT NEOPLASM OF VULVA, UNSPECIFIED: ICD-10-CM

## 2024-10-22 ENCOUNTER — RESULT REVIEW (OUTPATIENT)
Age: 64
End: 2024-10-22

## 2024-10-22 ENCOUNTER — APPOINTMENT (OUTPATIENT)
Dept: HEMATOLOGY ONCOLOGY | Facility: CLINIC | Age: 64
End: 2024-10-22
Payer: COMMERCIAL

## 2024-10-22 VITALS
WEIGHT: 130.71 LBS | TEMPERATURE: 98.6 F | DIASTOLIC BLOOD PRESSURE: 76 MMHG | OXYGEN SATURATION: 97 % | RESPIRATION RATE: 16 BRPM | HEART RATE: 70 BPM | BODY MASS INDEX: 24.7 KG/M2 | SYSTOLIC BLOOD PRESSURE: 151 MMHG

## 2024-10-22 DIAGNOSIS — C51.9 MALIGNANT NEOPLASM OF VULVA, UNSPECIFIED: ICD-10-CM

## 2024-10-22 LAB
BASOPHILS # BLD AUTO: 0.03 K/UL — SIGNIFICANT CHANGE UP (ref 0–0.2)
BASOPHILS NFR BLD AUTO: 0.6 % — SIGNIFICANT CHANGE UP (ref 0–2)
EOSINOPHIL # BLD AUTO: 0.2 K/UL — SIGNIFICANT CHANGE UP (ref 0–0.5)
EOSINOPHIL NFR BLD AUTO: 4 % — SIGNIFICANT CHANGE UP (ref 0–6)
HCT VFR BLD CALC: 40 % — SIGNIFICANT CHANGE UP (ref 34.5–45)
HGB BLD-MCNC: 13.2 G/DL — SIGNIFICANT CHANGE UP (ref 11.5–15.5)
IMM GRANULOCYTES NFR BLD AUTO: 0.2 % — SIGNIFICANT CHANGE UP (ref 0–0.9)
LYMPHOCYTES # BLD AUTO: 1.22 K/UL — SIGNIFICANT CHANGE UP (ref 1–3.3)
LYMPHOCYTES # BLD AUTO: 24.3 % — SIGNIFICANT CHANGE UP (ref 13–44)
MCHC RBC-ENTMCNC: 32.8 PG — SIGNIFICANT CHANGE UP (ref 27–34)
MCHC RBC-ENTMCNC: 33 G/DL — SIGNIFICANT CHANGE UP (ref 32–36)
MCV RBC AUTO: 99.3 FL — SIGNIFICANT CHANGE UP (ref 80–100)
MONOCYTES # BLD AUTO: 0.47 K/UL — SIGNIFICANT CHANGE UP (ref 0–0.9)
MONOCYTES NFR BLD AUTO: 9.3 % — SIGNIFICANT CHANGE UP (ref 2–14)
NEUTROPHILS # BLD AUTO: 3.1 K/UL — SIGNIFICANT CHANGE UP (ref 1.8–7.4)
NEUTROPHILS NFR BLD AUTO: 61.6 % — SIGNIFICANT CHANGE UP (ref 43–77)
NRBC # BLD: 0 /100 WBCS — SIGNIFICANT CHANGE UP (ref 0–0)
NRBC BLD-RTO: 0 /100 WBCS — SIGNIFICANT CHANGE UP (ref 0–0)
PLATELET # BLD AUTO: 206 K/UL — SIGNIFICANT CHANGE UP (ref 150–400)
RBC # BLD: 4.03 M/UL — SIGNIFICANT CHANGE UP (ref 3.8–5.2)
RBC # FLD: 13 % — SIGNIFICANT CHANGE UP (ref 10.3–14.5)
WBC # BLD: 5.03 K/UL — SIGNIFICANT CHANGE UP (ref 3.8–10.5)
WBC # FLD AUTO: 5.03 K/UL — SIGNIFICANT CHANGE UP (ref 3.8–10.5)

## 2024-10-22 PROCEDURE — 99214 OFFICE O/P EST MOD 30 MIN: CPT

## 2024-10-23 LAB
ALBUMIN SERPL ELPH-MCNC: 4.2 G/DL
ALP BLD-CCNC: 75 U/L
ALT SERPL-CCNC: 10 U/L
ANION GAP SERPL CALC-SCNC: 13 MMOL/L
AST SERPL-CCNC: 19 U/L
BILIRUB SERPL-MCNC: <0.2 MG/DL
BUN SERPL-MCNC: 18 MG/DL
CALCIUM SERPL-MCNC: 9.5 MG/DL
CHLORIDE SERPL-SCNC: 105 MMOL/L
CO2 SERPL-SCNC: 26 MMOL/L
CREAT SERPL-MCNC: 0.88 MG/DL
EGFR: 74 ML/MIN/1.73M2
GLUCOSE SERPL-MCNC: 96 MG/DL
POTASSIUM SERPL-SCNC: 4.6 MMOL/L
PROT SERPL-MCNC: 7.3 G/DL
SODIUM SERPL-SCNC: 144 MMOL/L

## 2024-10-31 ENCOUNTER — APPOINTMENT (OUTPATIENT)
Dept: RADIATION ONCOLOGY | Facility: CLINIC | Age: 64
End: 2024-10-31

## 2024-10-31 DIAGNOSIS — N95.2 POSTMENOPAUSAL ATROPHIC VAGINITIS: ICD-10-CM

## 2024-10-31 DIAGNOSIS — Z92.3 PERSONAL HISTORY OF IRRADIATION: ICD-10-CM

## 2024-10-31 DIAGNOSIS — C51.9 MALIGNANT NEOPLASM OF VULVA, UNSPECIFIED: ICD-10-CM

## 2024-10-31 PROCEDURE — 99212 OFFICE O/P EST SF 10 MIN: CPT

## 2024-10-31 PROCEDURE — G2211 COMPLEX E/M VISIT ADD ON: CPT | Mod: NC

## 2024-11-04 ENCOUNTER — NON-APPOINTMENT (OUTPATIENT)
Age: 64
End: 2024-11-04

## 2024-11-05 ENCOUNTER — APPOINTMENT (OUTPATIENT)
Dept: PLASTIC SURGERY | Facility: CLINIC | Age: 64
End: 2024-11-05
Payer: COMMERCIAL

## 2024-11-05 VITALS
SYSTOLIC BLOOD PRESSURE: 153 MMHG | DIASTOLIC BLOOD PRESSURE: 61 MMHG | HEART RATE: 89 BPM | OXYGEN SATURATION: 98 % | HEIGHT: 61 IN | TEMPERATURE: 98.2 F | BODY MASS INDEX: 24.17 KG/M2 | WEIGHT: 128 LBS

## 2024-11-05 DIAGNOSIS — C51.9 MALIGNANT NEOPLASM OF VULVA, UNSPECIFIED: ICD-10-CM

## 2024-11-05 PROCEDURE — 99213 OFFICE O/P EST LOW 20 MIN: CPT

## 2024-12-12 ENCOUNTER — APPOINTMENT (OUTPATIENT)
Dept: GYNECOLOGIC ONCOLOGY | Facility: CLINIC | Age: 64
End: 2024-12-12
Payer: COMMERCIAL

## 2024-12-12 ENCOUNTER — NON-APPOINTMENT (OUTPATIENT)
Age: 64
End: 2024-12-12

## 2024-12-12 VITALS
WEIGHT: 128 LBS | DIASTOLIC BLOOD PRESSURE: 76 MMHG | BODY MASS INDEX: 24.17 KG/M2 | TEMPERATURE: 98.4 F | SYSTOLIC BLOOD PRESSURE: 156 MMHG | HEIGHT: 61 IN | OXYGEN SATURATION: 97 % | HEART RATE: 80 BPM

## 2024-12-12 DIAGNOSIS — C51.9 MALIGNANT NEOPLASM OF VULVA, UNSPECIFIED: ICD-10-CM

## 2024-12-12 DIAGNOSIS — D07.1 CARCINOMA IN SITU OF VULVA: ICD-10-CM

## 2024-12-12 PROCEDURE — 99459 PELVIC EXAMINATION: CPT

## 2024-12-12 PROCEDURE — 99213 OFFICE O/P EST LOW 20 MIN: CPT

## 2025-01-07 ENCOUNTER — APPOINTMENT (OUTPATIENT)
Dept: PLASTIC SURGERY | Facility: CLINIC | Age: 65
End: 2025-01-07
Payer: COMMERCIAL

## 2025-01-07 DIAGNOSIS — C51.9 MALIGNANT NEOPLASM OF VULVA, UNSPECIFIED: ICD-10-CM

## 2025-01-07 PROCEDURE — 99213 OFFICE O/P EST LOW 20 MIN: CPT

## 2025-01-21 ENCOUNTER — APPOINTMENT (OUTPATIENT)
Dept: PLASTIC SURGERY | Facility: CLINIC | Age: 65
End: 2025-01-21
Payer: COMMERCIAL

## 2025-01-21 VITALS
BODY MASS INDEX: 24.17 KG/M2 | OXYGEN SATURATION: 98 % | WEIGHT: 128 LBS | SYSTOLIC BLOOD PRESSURE: 182 MMHG | HEIGHT: 61 IN | HEART RATE: 82 BPM | DIASTOLIC BLOOD PRESSURE: 89 MMHG | TEMPERATURE: 97.6 F

## 2025-01-21 PROCEDURE — 99213 OFFICE O/P EST LOW 20 MIN: CPT

## 2025-01-25 ENCOUNTER — OUTPATIENT (OUTPATIENT)
Dept: OUTPATIENT SERVICES | Facility: HOSPITAL | Age: 65
LOS: 1 days | Discharge: HOME | End: 2025-01-25

## 2025-01-25 DIAGNOSIS — Z98.890 OTHER SPECIFIED POSTPROCEDURAL STATES: Chronic | ICD-10-CM

## 2025-01-25 DIAGNOSIS — Z90.79 ACQUIRED ABSENCE OF OTHER GENITAL ORGAN(S): Chronic | ICD-10-CM

## 2025-01-25 DIAGNOSIS — C51.9 MALIGNANT NEOPLASM OF VULVA, UNSPECIFIED: ICD-10-CM

## 2025-01-28 ENCOUNTER — RESULT REVIEW (OUTPATIENT)
Age: 65
End: 2025-01-28

## 2025-01-28 ENCOUNTER — APPOINTMENT (OUTPATIENT)
Dept: HEMATOLOGY ONCOLOGY | Facility: CLINIC | Age: 65
End: 2025-01-28
Payer: COMMERCIAL

## 2025-01-28 VITALS
TEMPERATURE: 97.9 F | RESPIRATION RATE: 16 BRPM | HEART RATE: 70 BPM | OXYGEN SATURATION: 98 % | BODY MASS INDEX: 24.78 KG/M2 | SYSTOLIC BLOOD PRESSURE: 177 MMHG | DIASTOLIC BLOOD PRESSURE: 75 MMHG | WEIGHT: 131.17 LBS

## 2025-01-28 DIAGNOSIS — C51.9 MALIGNANT NEOPLASM OF VULVA, UNSPECIFIED: ICD-10-CM

## 2025-01-28 LAB
BASOPHILS # BLD AUTO: 0.02 K/UL — SIGNIFICANT CHANGE UP (ref 0–0.2)
BASOPHILS NFR BLD AUTO: 0.4 % — SIGNIFICANT CHANGE UP (ref 0–2)
EOSINOPHIL # BLD AUTO: 0.17 K/UL — SIGNIFICANT CHANGE UP (ref 0–0.5)
EOSINOPHIL NFR BLD AUTO: 3.6 % — SIGNIFICANT CHANGE UP (ref 0–6)
HCT VFR BLD CALC: 39 % — SIGNIFICANT CHANGE UP (ref 34.5–45)
HGB BLD-MCNC: 12.8 G/DL — SIGNIFICANT CHANGE UP (ref 11.5–15.5)
IMM GRANULOCYTES NFR BLD AUTO: 0.2 % — SIGNIFICANT CHANGE UP (ref 0–0.9)
LYMPHOCYTES # BLD AUTO: 1.12 K/UL — SIGNIFICANT CHANGE UP (ref 1–3.3)
LYMPHOCYTES # BLD AUTO: 23.4 % — SIGNIFICANT CHANGE UP (ref 13–44)
MCHC RBC-ENTMCNC: 32.7 PG — SIGNIFICANT CHANGE UP (ref 27–34)
MCHC RBC-ENTMCNC: 32.8 G/DL — SIGNIFICANT CHANGE UP (ref 32–36)
MCV RBC AUTO: 99.7 FL — SIGNIFICANT CHANGE UP (ref 80–100)
MONOCYTES # BLD AUTO: 0.42 K/UL — SIGNIFICANT CHANGE UP (ref 0–0.9)
MONOCYTES NFR BLD AUTO: 8.8 % — SIGNIFICANT CHANGE UP (ref 2–14)
NEUTROPHILS # BLD AUTO: 3.04 K/UL — SIGNIFICANT CHANGE UP (ref 1.8–7.4)
NEUTROPHILS NFR BLD AUTO: 63.6 % — SIGNIFICANT CHANGE UP (ref 43–77)
NRBC # BLD: 0 /100 WBCS — SIGNIFICANT CHANGE UP (ref 0–0)
NRBC BLD-RTO: 0 /100 WBCS — SIGNIFICANT CHANGE UP (ref 0–0)
PLATELET # BLD AUTO: 184 K/UL — SIGNIFICANT CHANGE UP (ref 150–400)
RBC # BLD: 3.91 M/UL — SIGNIFICANT CHANGE UP (ref 3.8–5.2)
RBC # FLD: 13.2 % — SIGNIFICANT CHANGE UP (ref 10.3–14.5)
WBC # BLD: 4.78 K/UL — SIGNIFICANT CHANGE UP (ref 3.8–10.5)
WBC # FLD AUTO: 4.78 K/UL — SIGNIFICANT CHANGE UP (ref 3.8–10.5)

## 2025-01-28 PROCEDURE — 99214 OFFICE O/P EST MOD 30 MIN: CPT

## 2025-02-22 ENCOUNTER — APPOINTMENT (OUTPATIENT)
Dept: NUCLEAR MEDICINE | Facility: CLINIC | Age: 65
End: 2025-02-22

## 2025-02-22 ENCOUNTER — OUTPATIENT (OUTPATIENT)
Dept: OUTPATIENT SERVICES | Facility: HOSPITAL | Age: 65
LOS: 1 days | End: 2025-02-22
Payer: COMMERCIAL

## 2025-02-22 DIAGNOSIS — Z98.890 OTHER SPECIFIED POSTPROCEDURAL STATES: Chronic | ICD-10-CM

## 2025-02-22 DIAGNOSIS — C51.9 MALIGNANT NEOPLASM OF VULVA, UNSPECIFIED: ICD-10-CM

## 2025-02-22 DIAGNOSIS — Z92.3 PERSONAL HISTORY OF IRRADIATION: ICD-10-CM

## 2025-02-22 DIAGNOSIS — Z90.79 ACQUIRED ABSENCE OF OTHER GENITAL ORGAN(S): Chronic | ICD-10-CM

## 2025-02-22 PROCEDURE — 78815 PET IMAGE W/CT SKULL-THIGH: CPT | Mod: 26,PS

## 2025-02-22 PROCEDURE — A9552: CPT

## 2025-02-22 PROCEDURE — 78815 PET IMAGE W/CT SKULL-THIGH: CPT

## 2025-02-25 ENCOUNTER — APPOINTMENT (OUTPATIENT)
Dept: RADIATION ONCOLOGY | Facility: CLINIC | Age: 65
End: 2025-02-25
Payer: COMMERCIAL

## 2025-02-25 VITALS
OXYGEN SATURATION: 99 % | TEMPERATURE: 96.98 F | DIASTOLIC BLOOD PRESSURE: 81 MMHG | HEART RATE: 64 BPM | SYSTOLIC BLOOD PRESSURE: 165 MMHG | HEIGHT: 61 IN | RESPIRATION RATE: 17 BRPM

## 2025-02-25 DIAGNOSIS — Z92.3 PERSONAL HISTORY OF IRRADIATION: ICD-10-CM

## 2025-02-25 PROCEDURE — G2211 COMPLEX E/M VISIT ADD ON: CPT | Mod: NC

## 2025-02-25 PROCEDURE — 99213 OFFICE O/P EST LOW 20 MIN: CPT

## 2025-03-04 ENCOUNTER — APPOINTMENT (OUTPATIENT)
Dept: PLASTIC SURGERY | Facility: CLINIC | Age: 65
End: 2025-03-04
Payer: COMMERCIAL

## 2025-03-04 VITALS
DIASTOLIC BLOOD PRESSURE: 70 MMHG | HEART RATE: 64 BPM | SYSTOLIC BLOOD PRESSURE: 152 MMHG | TEMPERATURE: 98.2 F | OXYGEN SATURATION: 100 %

## 2025-03-04 PROCEDURE — 99213 OFFICE O/P EST LOW 20 MIN: CPT

## 2025-03-13 ENCOUNTER — NON-APPOINTMENT (OUTPATIENT)
Age: 65
End: 2025-03-13

## 2025-03-13 ENCOUNTER — APPOINTMENT (OUTPATIENT)
Dept: GYNECOLOGIC ONCOLOGY | Facility: CLINIC | Age: 65
End: 2025-03-13
Payer: COMMERCIAL

## 2025-03-13 VITALS
HEIGHT: 61 IN | DIASTOLIC BLOOD PRESSURE: 83 MMHG | OXYGEN SATURATION: 97 % | WEIGHT: 133.6 LBS | BODY MASS INDEX: 25.22 KG/M2 | SYSTOLIC BLOOD PRESSURE: 167 MMHG | TEMPERATURE: 97.1 F | HEART RATE: 78 BPM

## 2025-03-13 DIAGNOSIS — M89.9 DISORDER OF BONE, UNSPECIFIED: ICD-10-CM

## 2025-03-13 DIAGNOSIS — C51.9 MALIGNANT NEOPLASM OF VULVA, UNSPECIFIED: ICD-10-CM

## 2025-03-13 DIAGNOSIS — D07.1 CARCINOMA IN SITU OF VULVA: ICD-10-CM

## 2025-03-13 PROCEDURE — 56605 BIOPSY OF VULVA/PERINEUM: CPT

## 2025-03-13 PROCEDURE — 99459 PELVIC EXAMINATION: CPT

## 2025-03-13 PROCEDURE — 99213 OFFICE O/P EST LOW 20 MIN: CPT | Mod: 25

## 2025-03-13 RX ORDER — ROSUVASTATIN CALCIUM 10 MG/1
10 TABLET, FILM COATED ORAL
Refills: 0 | Status: ACTIVE | COMMUNITY

## 2025-03-19 LAB — CORE LAB BIOPSY: NORMAL

## 2025-03-22 ENCOUNTER — APPOINTMENT (OUTPATIENT)
Dept: MRI IMAGING | Facility: CLINIC | Age: 65
End: 2025-03-22

## 2025-03-22 ENCOUNTER — OUTPATIENT (OUTPATIENT)
Dept: OUTPATIENT SERVICES | Facility: HOSPITAL | Age: 65
LOS: 1 days | End: 2025-03-22
Payer: COMMERCIAL

## 2025-03-22 DIAGNOSIS — Z90.79 ACQUIRED ABSENCE OF OTHER GENITAL ORGAN(S): Chronic | ICD-10-CM

## 2025-03-22 DIAGNOSIS — C51.9 MALIGNANT NEOPLASM OF VULVA, UNSPECIFIED: ICD-10-CM

## 2025-03-22 DIAGNOSIS — Z98.890 OTHER SPECIFIED POSTPROCEDURAL STATES: Chronic | ICD-10-CM

## 2025-03-22 DIAGNOSIS — Z00.8 ENCOUNTER FOR OTHER GENERAL EXAMINATION: ICD-10-CM

## 2025-03-22 PROCEDURE — 72197 MRI PELVIS W/O & W/DYE: CPT | Mod: 26

## 2025-03-22 PROCEDURE — A9585: CPT

## 2025-03-22 PROCEDURE — 72197 MRI PELVIS W/O & W/DYE: CPT

## 2025-04-01 ENCOUNTER — APPOINTMENT (OUTPATIENT)
Dept: RADIATION ONCOLOGY | Facility: CLINIC | Age: 65
End: 2025-04-01
Payer: COMMERCIAL

## 2025-04-01 VITALS
HEIGHT: 61 IN | SYSTOLIC BLOOD PRESSURE: 150 MMHG | RESPIRATION RATE: 17 BRPM | OXYGEN SATURATION: 100 % | WEIGHT: 135.63 LBS | TEMPERATURE: 97.1 F | HEART RATE: 70 BPM | BODY MASS INDEX: 25.61 KG/M2 | DIASTOLIC BLOOD PRESSURE: 77 MMHG

## 2025-04-01 DIAGNOSIS — C51.9 MALIGNANT NEOPLASM OF VULVA, UNSPECIFIED: ICD-10-CM

## 2025-04-01 DIAGNOSIS — Z92.3 PERSONAL HISTORY OF IRRADIATION: ICD-10-CM

## 2025-04-01 PROCEDURE — G2211 COMPLEX E/M VISIT ADD ON: CPT

## 2025-04-01 PROCEDURE — 99213 OFFICE O/P EST LOW 20 MIN: CPT

## 2025-04-27 ENCOUNTER — OUTPATIENT (OUTPATIENT)
Dept: OUTPATIENT SERVICES | Facility: HOSPITAL | Age: 65
LOS: 1 days | Discharge: HOME | End: 2025-04-27

## 2025-04-27 DIAGNOSIS — C51.9 MALIGNANT NEOPLASM OF VULVA, UNSPECIFIED: ICD-10-CM

## 2025-04-27 DIAGNOSIS — Z98.890 OTHER SPECIFIED POSTPROCEDURAL STATES: Chronic | ICD-10-CM

## 2025-04-27 DIAGNOSIS — Z90.79 ACQUIRED ABSENCE OF OTHER GENITAL ORGAN(S): Chronic | ICD-10-CM

## 2025-04-29 ENCOUNTER — RESULT REVIEW (OUTPATIENT)
Age: 65
End: 2025-04-29

## 2025-04-29 ENCOUNTER — APPOINTMENT (OUTPATIENT)
Dept: HEMATOLOGY ONCOLOGY | Facility: CLINIC | Age: 65
End: 2025-04-29
Payer: COMMERCIAL

## 2025-04-29 VITALS
HEART RATE: 74 BPM | TEMPERATURE: 97.3 F | RESPIRATION RATE: 16 BRPM | BODY MASS INDEX: 24.78 KG/M2 | OXYGEN SATURATION: 98 % | DIASTOLIC BLOOD PRESSURE: 80 MMHG | SYSTOLIC BLOOD PRESSURE: 164 MMHG | WEIGHT: 131.17 LBS

## 2025-04-29 DIAGNOSIS — C51.9 MALIGNANT NEOPLASM OF VULVA, UNSPECIFIED: ICD-10-CM

## 2025-04-29 LAB
HCT VFR BLD CALC: 36 % — SIGNIFICANT CHANGE UP (ref 34.5–45)
HGB BLD-MCNC: 12.2 G/DL — SIGNIFICANT CHANGE UP (ref 11.5–15.5)
MCHC RBC-ENTMCNC: 33.6 PG — SIGNIFICANT CHANGE UP (ref 27–34)
MCHC RBC-ENTMCNC: 33.9 G/DL — SIGNIFICANT CHANGE UP (ref 32–36)
MCV RBC AUTO: 99.2 FL — SIGNIFICANT CHANGE UP (ref 80–100)
PLATELET # BLD AUTO: 205 K/UL — SIGNIFICANT CHANGE UP (ref 150–400)
RBC # BLD: 3.63 M/UL — LOW (ref 3.8–5.2)
RBC # FLD: 12.2 % — SIGNIFICANT CHANGE UP (ref 10.3–14.5)
WBC # BLD: 4.97 K/UL — SIGNIFICANT CHANGE UP (ref 3.8–10.5)
WBC # FLD AUTO: 4.97 K/UL — SIGNIFICANT CHANGE UP (ref 3.8–10.5)

## 2025-04-29 PROCEDURE — 99213 OFFICE O/P EST LOW 20 MIN: CPT

## 2025-04-30 LAB
ALBUMIN SERPL ELPH-MCNC: 4 G/DL
ALP BLD-CCNC: 77 U/L
ALT SERPL-CCNC: 12 U/L
ANION GAP SERPL CALC-SCNC: 12 MMOL/L
AST SERPL-CCNC: 21 U/L
BILIRUB SERPL-MCNC: 0.2 MG/DL
BUN SERPL-MCNC: 21 MG/DL
CALCIUM SERPL-MCNC: 9.4 MG/DL
CHLORIDE SERPL-SCNC: 105 MMOL/L
CO2 SERPL-SCNC: 26 MMOL/L
CREAT SERPL-MCNC: 0.99 MG/DL
EGFRCR SERPLBLD CKD-EPI 2021: 64 ML/MIN/1.73M2
GLUCOSE SERPL-MCNC: 102 MG/DL
MAGNESIUM SERPL-MCNC: 2.1 MG/DL
POTASSIUM SERPL-SCNC: 4.8 MMOL/L
PROT SERPL-MCNC: 6.6 G/DL
SODIUM SERPL-SCNC: 143 MMOL/L

## 2025-05-08 NOTE — H&P PST ADULT - GENITOURINARY
[FreeTextEntry1] : GENERAL: alert, cooperative, in NAD SKIN: The skin is intact, warm, pink and dry over the area examined. EYES: Normal conjunctiva, normal eyelids and pupils were equal and round. ENT: normal ears, normal nose and normal lips. CARDIOVASCULAR: brisk capillary refill, but no peripheral edema. RESPIRATORY: The patient is in no apparent respiratory distress. They're taking full deep breaths without use of accessory muscles or evidence of audible wheezes or stridor without the use of a stethoscope. Normal respiratory effort. ABDOMEN: not examined  Left Knee: No bony deformities, signs of trauma, or erythema noted. No visible effusion, muscle atrophy or asymmetry. (+) medial and lateral joint line tenderness. Full active and passive range of motion of the knee. Pain with hyperflexion. Toes are warm, pink, and moving freely. Strength is 5/5. Sensation is intact to light touch distally. Patellar reflex +2 B/L. Brisk capillary refill in all toes. No joint laxity palpable. Joint is stable with varus and valgus stress. Negative Lachmann test, negative anterior and posterior drawer with solid end point. Negative [facility] test. Negative patellar grind and patellar apprehension test. No abnormal findings on ankle or hip examination.  Bilateral Feet: Patient has bilateral mild arch collapse with standing. The arches reform when sitting and on toe dorsiflexion. Subtalar motion is full and free. There is no heel cord tightness. Skin is clean and intact. No swelling, erythema, or ecchymosis. No lymphedema. Grossly non tender to palpation over LE Full ROM bilateral hips/knees/ankles. SILT, 5/5 strength EHL/FHL/ TA/GS DP 2+, Brisk cap refill <2 seconds details…

## 2025-06-03 ENCOUNTER — APPOINTMENT (OUTPATIENT)
Dept: PLASTIC SURGERY | Facility: CLINIC | Age: 65
End: 2025-06-03
Payer: COMMERCIAL

## 2025-06-03 VITALS
HEART RATE: 54 BPM | TEMPERATURE: 98.2 F | HEIGHT: 61 IN | WEIGHT: 130 LBS | DIASTOLIC BLOOD PRESSURE: 86 MMHG | BODY MASS INDEX: 24.55 KG/M2 | SYSTOLIC BLOOD PRESSURE: 164 MMHG | OXYGEN SATURATION: 100 %

## 2025-06-03 DIAGNOSIS — C51.9 MALIGNANT NEOPLASM OF VULVA, UNSPECIFIED: ICD-10-CM

## 2025-06-03 PROCEDURE — 99213 OFFICE O/P EST LOW 20 MIN: CPT

## 2025-07-01 ENCOUNTER — APPOINTMENT (OUTPATIENT)
Dept: RADIATION ONCOLOGY | Facility: CLINIC | Age: 65
End: 2025-07-01

## 2025-07-01 VITALS
TEMPERATURE: 97 F | BODY MASS INDEX: 24.35 KG/M2 | HEART RATE: 68 BPM | DIASTOLIC BLOOD PRESSURE: 67 MMHG | HEIGHT: 61 IN | OXYGEN SATURATION: 100 % | WEIGHT: 129 LBS | SYSTOLIC BLOOD PRESSURE: 130 MMHG | RESPIRATION RATE: 16 BRPM

## 2025-07-01 PROCEDURE — G2211 COMPLEX E/M VISIT ADD ON: CPT | Mod: NC

## 2025-07-01 PROCEDURE — 99212 OFFICE O/P EST SF 10 MIN: CPT

## 2025-07-15 ENCOUNTER — APPOINTMENT (OUTPATIENT)
Dept: GYNECOLOGIC ONCOLOGY | Facility: CLINIC | Age: 65
End: 2025-07-15
Payer: COMMERCIAL

## 2025-07-15 VITALS
BODY MASS INDEX: 24.35 KG/M2 | SYSTOLIC BLOOD PRESSURE: 164 MMHG | OXYGEN SATURATION: 100 % | HEIGHT: 61 IN | DIASTOLIC BLOOD PRESSURE: 78 MMHG | HEART RATE: 75 BPM | WEIGHT: 129 LBS

## 2025-07-15 PROCEDURE — 99213 OFFICE O/P EST LOW 20 MIN: CPT

## 2025-07-15 PROCEDURE — 99459 PELVIC EXAMINATION: CPT

## 2025-07-15 RX ORDER — VALSARTAN 40 MG/1
40 TABLET, COATED ORAL
Refills: 0 | Status: ACTIVE | COMMUNITY

## 2025-07-15 RX ORDER — VALSARTAN 80 MG/1
80 TABLET, COATED ORAL
Refills: 0 | Status: ACTIVE | COMMUNITY

## 2025-07-17 LAB — HPV HIGH+LOW RISK DNA PNL CVX: NOT DETECTED

## 2025-07-19 LAB — CYTOLOGY CVX/VAG DOC THIN PREP: ABNORMAL

## (undated) DEVICE — CANISTER SUCTION 2000CC

## (undated) DEVICE — GLV 5.5 PROTEXIS (WHITE)

## (undated) DEVICE — VENODYNE/SCD SLEEVE CALF MEDIUM

## (undated) DEVICE — POSITIONER PINK PAD PIGAZZI SYSTEM

## (undated) DEVICE — WARMING BLANKET UPPER ADULT

## (undated) DEVICE — PACK GENERAL MINOR

## (undated) DEVICE — SUT VICRYL 2-0 27" SH UNDYED

## (undated) DEVICE — GLV 6 PROTEXIS (BLUE)

## (undated) DEVICE — DRAPE INSTRUMENT POUCH 6.75" X 11"

## (undated) DEVICE — PREP BETADINE SPONGE STICKS

## (undated) DEVICE — DRSG COMBINE 5X9"

## (undated) DEVICE — SPONGE PEANUT AUTO COUNT

## (undated) DEVICE — LIGASURE EXACT DISSECTOR

## (undated) DEVICE — GLV 6 PROTEXIS (WHITE)

## (undated) DEVICE — DRAPE UNDER BUTTOCKS W SCREEN

## (undated) DEVICE — DRSG DERMABOND PRINEO 60CM

## (undated) DEVICE — SUT SILK 2-0 18" FS

## (undated) DEVICE — MEDICINE CUP WITH LID 60ML

## (undated) DEVICE — SUCTION YANKAUER NO CONTROL VENT

## (undated) DEVICE — SOL IRR POUR NS 0.9% 500ML

## (undated) DEVICE — DRAPE LAVH 124" X 30" X125"

## (undated) DEVICE — ELCTR BOVIE PENCIL SMOKE EVACUATION

## (undated) DEVICE — SUT VICRYL 3-0 27" SH UNDYED

## (undated) DEVICE — FOLEY TRAY 16FR 5CC LF UMETER CLOSED

## (undated) DEVICE — SUT VICRYL RAPIDE 3-0 18" PS-2 UNDYED

## (undated) DEVICE — SUT VICRYL 0 18" TIES UNDYED

## (undated) DEVICE — GOWN LG

## (undated) DEVICE — PACK MINOR WITH LAP

## (undated) DEVICE — TUBING SUCTION 20FT

## (undated) DEVICE — DRAPE 3/4 SHEET 52X76"

## (undated) DEVICE — ELCTR GROUNDING PAD ADULT COVIDIEN

## (undated) DEVICE — PREP BETADINE KIT